# Patient Record
Sex: FEMALE | Race: WHITE | NOT HISPANIC OR LATINO | Employment: OTHER | URBAN - METROPOLITAN AREA
[De-identification: names, ages, dates, MRNs, and addresses within clinical notes are randomized per-mention and may not be internally consistent; named-entity substitution may affect disease eponyms.]

---

## 2017-08-08 ENCOUNTER — CONVERSION ENCOUNTER (OUTPATIENT)
Dept: OTHER | Facility: HOSPITAL | Age: 47
End: 2017-08-08

## 2018-03-02 ENCOUNTER — OFFICE VISIT CONVERTED (OUTPATIENT)
Dept: FAMILY MEDICINE CLINIC | Age: 48
End: 2018-03-02
Attending: NURSE PRACTITIONER

## 2018-05-31 ENCOUNTER — OFFICE VISIT CONVERTED (OUTPATIENT)
Dept: GASTROENTEROLOGY | Facility: CLINIC | Age: 48
End: 2018-05-31
Attending: INTERNAL MEDICINE

## 2018-11-13 ENCOUNTER — OFFICE VISIT CONVERTED (OUTPATIENT)
Dept: FAMILY MEDICINE CLINIC | Age: 48
End: 2018-11-13
Attending: NURSE PRACTITIONER

## 2019-01-29 ENCOUNTER — HOSPITAL ENCOUNTER (OUTPATIENT)
Dept: OTHER | Facility: HOSPITAL | Age: 49
Discharge: HOME | End: 2019-01-29
Attending: NURSE PRACTITIONER

## 2019-02-16 ENCOUNTER — OFFICE VISIT CONVERTED (OUTPATIENT)
Dept: FAMILY MEDICINE CLINIC | Age: 49
End: 2019-02-16
Attending: FAMILY MEDICINE

## 2019-06-06 ENCOUNTER — HOSPITAL ENCOUNTER (OUTPATIENT)
Dept: OTHER | Facility: HOSPITAL | Age: 49
Discharge: HOME | End: 2019-06-06
Attending: NURSE PRACTITIONER

## 2019-06-06 ENCOUNTER — OFFICE VISIT CONVERTED (OUTPATIENT)
Dept: FAMILY MEDICINE CLINIC | Age: 49
End: 2019-06-06
Attending: NURSE PRACTITIONER

## 2019-06-29 ENCOUNTER — OFFICE VISIT CONVERTED (OUTPATIENT)
Dept: FAMILY MEDICINE CLINIC | Age: 49
End: 2019-06-29
Attending: NURSE PRACTITIONER

## 2019-07-06 ENCOUNTER — OFFICE VISIT CONVERTED (OUTPATIENT)
Dept: FAMILY MEDICINE CLINIC | Age: 49
End: 2019-07-06
Attending: FAMILY MEDICINE

## 2019-09-17 ENCOUNTER — HOSPITAL ENCOUNTER (OUTPATIENT)
Dept: OTHER | Facility: HOSPITAL | Age: 49
Discharge: HOME | End: 2019-09-17

## 2019-10-05 ENCOUNTER — OFFICE VISIT CONVERTED (OUTPATIENT)
Dept: FAMILY MEDICINE CLINIC | Age: 49
End: 2019-10-05
Attending: NURSE PRACTITIONER

## 2019-10-15 ENCOUNTER — HOSPITAL ENCOUNTER (OUTPATIENT)
Dept: OTHER | Facility: HOSPITAL | Age: 49
Discharge: HOME | End: 2019-10-15
Attending: NURSE PRACTITIONER

## 2019-10-15 ENCOUNTER — OFFICE VISIT CONVERTED (OUTPATIENT)
Dept: FAMILY MEDICINE CLINIC | Age: 49
End: 2019-10-15
Attending: NURSE PRACTITIONER

## 2019-10-15 LAB
ALBUMIN SERPL-MCNC: 4.4 G/DL (ref 3.5–5)
ALBUMIN/GLOB SERPL: 1.2 {RATIO} (ref 1.4–2.6)
ALP SERPL-CCNC: 70 U/L (ref 53–141)
ALT SERPL-CCNC: 27 U/L (ref 10–40)
AMYLASE SERPL-CCNC: 66 U/L (ref 30–110)
ANION GAP SERPL CALC-SCNC: 18 MMOL/L (ref 8–19)
AST SERPL-CCNC: 27 U/L (ref 15–50)
BILIRUB SERPL-MCNC: 0.34 MG/DL (ref 0.2–1.3)
BUN SERPL-MCNC: 9 MG/DL (ref 5–25)
BUN/CREAT SERPL: 12 {RATIO} (ref 6–20)
CALCIUM SERPL-MCNC: 10.1 MG/DL (ref 8.7–10.4)
CHLORIDE SERPL-SCNC: 103 MMOL/L (ref 99–111)
CONV CO2: 24 MMOL/L (ref 22–32)
CONV TOTAL PROTEIN: 8.1 G/DL (ref 6.3–8.2)
CREAT UR-MCNC: 0.74 MG/DL (ref 0.5–0.9)
ERYTHROCYTE [DISTWIDTH] IN BLOOD BY AUTOMATED COUNT: 13 % (ref 11.7–14.4)
GFR SERPLBLD BASED ON 1.73 SQ M-ARVRAT: >60 ML/MIN/{1.73_M2}
GLOBULIN UR ELPH-MCNC: 3.7 G/DL (ref 2–3.5)
GLUCOSE SERPL-MCNC: 93 MG/DL (ref 65–99)
HCT VFR BLD AUTO: 38.9 % (ref 37–47)
HGB BLD-MCNC: 12.8 G/DL (ref 12–16)
LIPASE SERPL-CCNC: 44 U/L (ref 5–51)
MCH RBC QN AUTO: 27.6 PG (ref 27–31)
MCHC RBC AUTO-ENTMCNC: 32.9 G/DL (ref 33–37)
MCV RBC AUTO: 84 FL (ref 81–99)
OSMOLALITY SERPL CALC.SUM OF ELEC: 290 MOSM/KG (ref 273–304)
PLATELET # BLD AUTO: 358 10*3/UL (ref 130–400)
PMV BLD AUTO: 9.2 FL (ref 9.4–12.3)
POTASSIUM SERPL-SCNC: 4.3 MMOL/L (ref 3.5–5.3)
RBC # BLD AUTO: 4.63 10*6/UL (ref 4.2–5.4)
SODIUM SERPL-SCNC: 141 MMOL/L (ref 135–147)
WBC # BLD AUTO: 7.05 10*3/UL (ref 4.8–10.8)

## 2019-10-31 ENCOUNTER — HOSPITAL ENCOUNTER (OUTPATIENT)
Dept: OTHER | Facility: HOSPITAL | Age: 49
Discharge: HOME | End: 2019-10-31
Attending: NURSE PRACTITIONER

## 2019-11-04 ENCOUNTER — HOSPITAL ENCOUNTER (OUTPATIENT)
Dept: OTHER | Facility: HOSPITAL | Age: 49
Discharge: HOME | End: 2019-11-04
Attending: NURSE PRACTITIONER

## 2020-03-17 ENCOUNTER — OFFICE VISIT CONVERTED (OUTPATIENT)
Dept: FAMILY MEDICINE CLINIC | Age: 50
End: 2020-03-17
Attending: FAMILY MEDICINE

## 2020-04-08 ENCOUNTER — OFFICE VISIT CONVERTED (OUTPATIENT)
Dept: CARDIOLOGY | Facility: CLINIC | Age: 50
End: 2020-04-08
Attending: INTERNAL MEDICINE

## 2020-04-08 ENCOUNTER — CONVERSION ENCOUNTER (OUTPATIENT)
Dept: CARDIOLOGY | Facility: CLINIC | Age: 50
End: 2020-04-08
Attending: INTERNAL MEDICINE

## 2020-05-01 ENCOUNTER — HOSPITAL ENCOUNTER (OUTPATIENT)
Dept: NUCLEAR MEDICINE | Facility: HOSPITAL | Age: 50
Discharge: HOME | End: 2020-05-01
Attending: INTERNAL MEDICINE

## 2020-05-01 ENCOUNTER — OFFICE VISIT CONVERTED (OUTPATIENT)
Dept: FAMILY MEDICINE CLINIC | Age: 50
End: 2020-05-01
Attending: FAMILY MEDICINE

## 2020-05-28 ENCOUNTER — OFFICE VISIT CONVERTED (OUTPATIENT)
Dept: FAMILY MEDICINE CLINIC | Age: 50
End: 2020-05-28
Attending: FAMILY MEDICINE

## 2020-06-16 ENCOUNTER — HOSPITAL ENCOUNTER (OUTPATIENT)
Dept: OTHER | Facility: HOSPITAL | Age: 50
Discharge: HOME | End: 2020-06-16

## 2020-07-14 ENCOUNTER — OFFICE VISIT CONVERTED (OUTPATIENT)
Dept: FAMILY MEDICINE CLINIC | Age: 50
End: 2020-07-14
Attending: FAMILY MEDICINE

## 2020-07-14 ENCOUNTER — HOSPITAL ENCOUNTER (OUTPATIENT)
Dept: OTHER | Facility: HOSPITAL | Age: 50
Discharge: HOME | End: 2020-07-14
Attending: FAMILY MEDICINE

## 2020-10-01 ENCOUNTER — HOSPITAL ENCOUNTER (OUTPATIENT)
Dept: OTHER | Facility: HOSPITAL | Age: 50
Discharge: HOME | End: 2020-10-01
Attending: NURSE PRACTITIONER

## 2020-10-01 ENCOUNTER — OFFICE VISIT CONVERTED (OUTPATIENT)
Dept: FAMILY MEDICINE CLINIC | Age: 50
End: 2020-10-01
Attending: NURSE PRACTITIONER

## 2021-03-26 ENCOUNTER — HOSPITAL ENCOUNTER (OUTPATIENT)
Dept: OTHER | Facility: HOSPITAL | Age: 51
Discharge: HOME | End: 2021-03-26
Attending: FAMILY MEDICINE

## 2021-03-26 LAB
APPEARANCE UR: CLEAR
BILIRUB UR QL: NEGATIVE
COLOR UR: YELLOW
CONV BACTERIA: NEGATIVE
CONV COLLECTION SOURCE (UA): ABNORMAL
CONV HYALINE CASTS IN URINE MICRO: ABNORMAL /[LPF]
CONV UROBILINOGEN IN URINE BY AUTOMATED TEST STRIP: 1 {EHRLICHU}/DL (ref 0.1–1)
GLUCOSE UR QL: NEGATIVE MG/DL
HGB UR QL STRIP: ABNORMAL
KETONES UR QL STRIP: NEGATIVE MG/DL
LEUKOCYTE ESTERASE UR QL STRIP: ABNORMAL
NITRITE UR QL STRIP: NEGATIVE
PH UR STRIP.AUTO: 6 [PH] (ref 5–8)
PROT UR QL: NEGATIVE MG/DL
RBC #/AREA URNS HPF: ABNORMAL /[HPF]
SP GR UR: 1.02 (ref 1–1.03)
WBC #/AREA URNS HPF: ABNORMAL /[HPF]

## 2021-03-28 LAB — BACTERIA UR CULT: NORMAL

## 2021-04-13 ENCOUNTER — HOSPITAL ENCOUNTER (OUTPATIENT)
Dept: OTHER | Facility: HOSPITAL | Age: 51
Discharge: HOME | End: 2021-04-13
Attending: FAMILY MEDICINE

## 2021-04-13 ENCOUNTER — OFFICE VISIT CONVERTED (OUTPATIENT)
Dept: FAMILY MEDICINE CLINIC | Age: 51
End: 2021-04-13
Attending: FAMILY MEDICINE

## 2021-04-17 ENCOUNTER — OFFICE VISIT CONVERTED (OUTPATIENT)
Dept: FAMILY MEDICINE CLINIC | Age: 51
End: 2021-04-17
Attending: FAMILY MEDICINE

## 2021-05-10 NOTE — H&P
History and Physical      Patient Name: Marcela Gutierrez   Patient ID: [id number]   Sex: Female   YOB: 1970    Primary Care Provider: Madelin Espinal MD   Referring Provider: Madelin Espinal MD    Visit Date: April 8, 2020    Provider: Mario Green MD   Location: [facility]   Location Address: [address] [place] [address], [facility]   [place], [place]  [id number]   Location Phone: [phone]          Chief Complaint  · Chest discomfort      History Of Present Illness  Consult requested by: Madelin Espinal MD   Marcela Gutierrez is a 49 year old /White female with a previous history of  shunt placement for pseudotumor cerebri who had an episode while at the gym working out, doing elliptical machine, in which her heart rate went up into the 180s. The patient reports this lasted for a few minutes and was associated with some dizziness. Subsequently, the patient also has had a episodes of left jaw and arm pain, shooting in nature, sharp, associated with diaphoresis and nausea lasting for several minutes, 6/10 in intensity, for which she went to the emergency room. Workup there was unremarkable. Patient reports also having some left jaw pain the day of her tachycardic episode at the gym, as well. She has not had any syncope.   PAST MEDICAL HISTORY: Bulimia; Chronic degenerative knee problems making ambulation difficult; GERD; Hypertension; Pseudotumor cerebri with  shunt.   FAMILY MEDICAL HISTORY: Not known, as patient is adopted.   PSYCHOSOCIAL HISTORY: Patient denies smoking. Does use alcohol rarely. Caffeine daily. . Denies mood changes or depression.   CURRENT MEDICATIONS: Metoprolol 25 mg daily; baclofen 10 mg p.r.n.; vitamin C 1000 mg daily; vitamin K2 100 mcg daily; vitamin D 50,000 units weekly; vitamin B12 complex daily; glucosamine chondroitin daily. The dosage and frequency of the medications were reviewed with the patient.   ALLERGIES: MORPHINE; PERCODAN; PERCOCET; ZOLOFT;  "MASTAZOLE; GREEN DYE.       Review of Systems  · Constitutional  o Admits  o : good general health lately, recent weight changes   o Denies  o : fatigue  · Eyes  o Denies  o : double vision  · HENT  o Denies  o : hearing loss or ringing, chronic sinus problem, swollen glands in neck  · Cardiovascular  o Admits  o : chest pain, swelling (feet, ankles, hands), shortness of breath with activities   o Denies  o : palpitations (fast, fluttering, or skipping beats)  · Respiratory  o Admits  o : asthma or wheezing  o Denies  o : COPD  · Gastrointestinal  o Admits  o : nausea or vomiting  o Denies  o : ulcers  · Neurologic  o Admits  o : lightheaded or dizzy, headaches  o Denies  o : stroke  · Musculoskeletal  o Admits  o : joint pain, back pain  · Endocrine  o Denies  o : thyroid disease, diabetes, heat or cold intolerance, excessive thirst or urination  · Heme-Lymph  o Denies  o : bleeding or bruising tendency, anemia      Vitals  Date Time BP Position Site L\R Cuff Size HR RR TEMP (F) WT  HT  BMI kg/m2 BSA m2 O2 Sat        04/08/2020 10:43 /84 Sitting    72 - R   272lbs 0oz 5'  3\" 48.18 2.34           Physical Examination  · Constitutional  o Appearance  o : Awake, alert, in no acute distress.   · Head and Face  o HEENT  o : PERRLA.  · Eyes  o Conjunctivae  o : Normal.  · Ears, Nose, Mouth and Throat  o Oral Cavity  o :   § Oral Mucosa  § : Normal.  · Neck  o Inspection/Palpation  o : No JVD.  · Respiratory  o Respiratory  o : Chest is symmetrical. Lung fields are clear. No rhonchi or wheezes heard.  · Cardiovascular  o Heart  o :   § Auscultation of Heart  § : S1, S2 normal. Regular rate and rhythm without murmurs, gallops, or rubs.  o Peripheral Vascular System  o :   § Extremities  § : Nails normal. No clubbing or cyanosis. Femoral pulses adequate. Pedal pulses adequate. No peripheral edema.  · Gastrointestinal  o Abdominal Examination  o : Abdomen soft. No masses. No guarding or rigidity. No " hepatosplenomegaly. Bowel sounds normal.  · Musculoskeletal  o General  o :   § General Musculoskeletal  § : Muscle tone and strength were normal.  · Skin and Subcutaneous Tissue  o General Inspection  o : Unremarkable.  · EKG  o EKG  o : EKG showed normal sinus rhythm. No acute changes.  · Labs  o Labs  o : Creatinine 0.74, hemoglobin 12.8. Troponin less than 0.017.           Assessment     ASSESSMENT & PLAN:    1.  Tachycardia.  Episodes sounding like paroxysmal SVT.  Recommended checking echocardiogram.  Continue        with the current dose of metoprolol for the time being.  She has not had any recurrent episodes.         Encouraged avoidance of any stimulants, such as caffeine or pseudoephedrine.  Will also get a 24-hour        Holter monitor to evaluate for underlying dysrhythmia.    2.  Chest discomfort, occurring intermittently, nonexertional in nature, is concerning though for possible anginal        symptoms.  Recommend a stress test given her poor ambulatory ability due to knee problems.  Will have to        get a Lexiscan nuclear stress test.        Mario Green MD  /             Electronically Signed by: Viviana Lee-, Other -Author on April 13, 2020 10:24:35 AM  Electronically Co-signed by: Mario Green MD -Reviewer on April 20, 2020 02:35:35 PM

## 2021-05-10 NOTE — PROCEDURES
Procedure Note      Patient Name: Marcela Gutierrez   Patient ID: [id number]   Sex: Female   YOB: 1970    Primary Care Provider: Madelin Espinal MD   Referring Provider: Madelin Espinal MD    Visit Date: April 8, 2020    Provider: Mario Green MD   Location: [facility]   Location Address: [address] [place] [address] A   [place]  [id number]   Location Phone: [phone]          FINAL REPORT   HOLTER MONITOR REPORT  Date: 04/08/2020   Indication: Tachycardia      The patient underwent a 24-hour Holter monitor.  The average heart rate was 73 beats per minute.  The maximum heart rate was 125 beats per minute.  The minimum heart rate was 51 beats per minute.  There was one episode of PVC.  No other dysrhythmias seen.      IMPRESSION:     1.  Normal average heart rate of 73 beats per minute.    2.  One episode of PVC.        MD BRITT Ryan/nik    This note was transcribed by Allie Sellers.  [email or web address]/britt  The above service was transcribed by Allie Sellers, and I attest to the accuracy of the note.  BRITT                 Electronically Signed by: Gina Sellers-, Other -Author on April 16, 2020 09:38:46 AM  Electronically Co-signed by: Mario Green MD -Reviewer on April 20, 2020 01:50:39 PM

## 2021-05-15 VITALS
WEIGHT: 272 LBS | SYSTOLIC BLOOD PRESSURE: 140 MMHG | BODY MASS INDEX: 48.2 KG/M2 | HEIGHT: 63 IN | HEART RATE: 72 BPM | DIASTOLIC BLOOD PRESSURE: 84 MMHG

## 2021-05-16 VITALS — HEART RATE: 56 BPM | OXYGEN SATURATION: 96 % | BODY MASS INDEX: 50.02 KG/M2 | HEIGHT: 64 IN | WEIGHT: 293 LBS

## 2021-05-18 NOTE — PROGRESS NOTES
Marcela Greenberg. 1970     Office/Outpatient Visit    Visit Date: Fri, Mar 2, 2018 02:52 pm    Provider: Gina Perry N.P. (Assistant: Yeni Leos LPN)    Location: [facility]        Electronically signed by Gina Perry N.P. on  03/03/2018 11:30:04 AM                             SUBJECTIVE:        CC:     Mrs. Greenberg is a 47 year old White female.  She presents with back pain x3 weeks.  Went to [facility] ER last week 2x, xrays done and told her it was muscle spasms..          HPI:         Patient complains of back pain.  Pt states she was carrying totes up her stairs. She couldn't carry it so she tried to pull and then push. She couldn't get it d/t being worn out with it and back pain. The next morning when trying to get out of bed, she had a hard time swinging her leg out and over to the bed side. States back and low back and hip pain. She was leaning over and having a hard time standing up straight. She took IBF 800mg 3 times throughout that day. Then took a xanaflex that night. Woke up the next am with same issues. This went on for about 1 week. She called here for appt on Sat. She was told to go to ER. She went and they said it was muscular. Pt statse having grafts to her back along with other back surgeries. States med given at ER didn't help much. States going out to her neice's party. She coulldn't sit up straight. She then went back to ER last night. States pain was worse after trying to put her socks on. Pain was radiating down her L leg. Started with intermittent pain and then was constant.      ROS:     CONSTITUTIONAL:  Negative for chills, fatigue, fever, and weight change.      EYES:  Negative for blurred vision.      CARDIOVASCULAR:  Negative for chest pain, orthopnea, paroxysmal nocturnal dyspnea and pedal edema.      RESPIRATORY:  Negative for dyspnea.      GASTROINTESTINAL:  Negative for abdominal pain, constipation, diarrhea, nausea and vomiting.      MUSCULOSKELETAL:   Positive for back pain.      NEUROLOGICAL:  Negative for dizziness, headaches, paresthesias, and weakness.      PSYCHIATRIC:  Negative for anxiety, depression, and sleep disturbances.          PMH/FMH/SH:     Last Reviewed on 3/02/2018 03:35 PM by Gina Perry    Past Medical History:         pseudotumor cerebri/loss vision right eye     bulemia    vit D def    HTN    GERD             PAST MEDICAL HISTORY             CURRENT MEDICAL PROVIDERS:    Gastroenterologist: (name unknown)    Neurologist: dr upton    neurosurgeon dr. Nelly [place], Dr. Prem spencer         PREVENTIVE HEALTH MAINTENANCE             MAMMOGRAM: Done within last 2 years and results in are chart was last done 2017 with normal results     PAP SMEAR: was last done Hysterectomy BELINDA BSO  for heavy bleeding with normal results         Surgical History:         Appendectomy    Cholecystectomy    Hysterectomy: ;     oophrectomy;      Laminectomy: cervical region; at 1,2;  Uterine Ablation    gastric stapling;     shunt ; Procedures: colonoscopy  EGD  lexiscan stress test 2014 normal         Family History: adopted (drug abuse)     Father: Medical history unknown     Mother:     ; Positive for colon and esophageal cancer;     Brother(s): 1 brother(s) total    ; Positive for enlarged heart;     Sister(s): 4 sister(s) total    ; Positive for Drug Abuse;         Social History:     Occupation:    Disabled (pseudo tumor/vision loss)     Marital Status:      Children: 3 children         Tobacco/Alcohol/Supplements:     Last Reviewed on 3/02/2018 03:35 PM by Gina Perry    Tobacco: She has never smoked.          Alcohol:  Does not drink alcohol and never has.          Substance Abuse History:     Last Reviewed on 3/02/2018 03:35 PM by Gina Perry        Mental Health History:     Last Reviewed on 3/02/2018 03:35 PM by Gina Perry        Communicable Diseases (eg STDs):     Last Reviewed  on 3/02/2018 03:35 PM by Gina Perry            Current Problems:     Last Reviewed on 3/02/2018 03:35 PM by Gina Perry    Joint pain, NEC     Lower back pain     Chronic low back pain     Patient with cerebral ventricle (communicating) shunt, valve, or device in situ     Depression responsive to treatment     HTN     Vitamin D deficiency     Esophageal reflux         Immunizations:     Fluzone (3 + years dose) 1/25/2018         Allergies:     Last Reviewed on 3/02/2018 03:35 PM by Gina Perry    Percodan:    Percocet:    Morphine Sulfate:    Zoloft:        Current Medications:     Last Reviewed on 3/02/2018 03:35 PM by Gina Perry    Ibuprofen 800mg Tablet 1 bid PRN     Albuterol 90mcg/1actuation Oral Inhaler 2 puffs  QID  PRN     Baclofen 10mg Tablet     Prozac 20mg Capsules Take  2  capsule(s) by mouth qam     Norco     Prednisone     Protonix     Skelaxin 800mg Tablet One every 6-8 hrs prn muscle spasms     Zantac 300mg Tablet 1 bid     Zyrtec 10mg Tablet 1 tab daily         OBJECTIVE:        Vitals:         Current: 3/2/2018 2:56:27 PM    Ht:  5 ft, 3.5 in;  Wt: 312.9 lbs;  BMI: 54.6    T: 97.3 F (oral);  BP: 146/88 mm Hg (left arm, sitting);  P: 98 bpm (left arm (BP Cuff), sitting);  sCr: 0.76 mg/dL;  GFR: 120.45        Exams:     PHYSICAL EXAM:     GENERAL: vital signs recorded - well developed, well nourished;  no apparent distress;     EYES: extraocular movements intact; conjunctiva and cornea are normal; PERRLA;     NECK: range of motion is normal; thyroid is non-palpable;     RESPIRATORY: normal respiratory rate and pattern with no distress; normal breath sounds with no rales, rhonchi, wheezes or rubs;     CARDIOVASCULAR: normal rate; rhythm is regular;  no systolic murmur; no edema;     GASTROINTESTINAL: nontender; normal bowel sounds; no organomegaly;     MUSCULOSKELETAL: Low back tenderness, scarring from prior surgery with ocncave area noted. Low back tightness noted. SLR pos  L at 20 degrees. SLR neg to R. Difficulty changing position from sitting to standing.;     NEUROLOGICAL:  cranial nerves, motor and sensory function, reflexes, gait and coordination are all intact;     PSYCHIATRIC:  appropriate affect and demeanor; normal speech pattern; grossly normal memory;         ASSESSMENT:           722.10   M54.16  Lumbar radiculopathy              DDx:         ORDERS:         Radiology/Test Orders:       [id number]  Magnetic resonance imaging, spinal canal and contents, lumbar; without contrast  (Send-Out)                   PLAN:          Lumbar radiculopathy         FOLLOW-UP TESTING #1:    RADIOLOGY:  I have ordered MRI Lumbar Spine w/o contrast to be done today.            Prescriptions: Continue meds already given.           Orders:       [id number]  Magnetic resonance imaging, spinal canal and contents, lumbar; without contrast  (Send-Out)             Patient Education Handouts:       Magnetic Resonance Imaging (MRI)              CHARGE CAPTURE:           Primary Diagnosis:     722.10 Lumbar radiculopathy            M54.16    Radiculopathy, lumbar region              Orders:          [id number]   Office/outpatient visit; established patient, level 3  (In-House)

## 2021-05-18 NOTE — PROGRESS NOTES
"Marcela Gutierrez  1970     Office/Outpatient Visit    Visit Date: Tue, Mar 17, 2020 04:38 pm    Provider: Tony Cat MD (Assistant: Quiana Barnes MA)    Location: [facility]        Electronically signed by Tony Cat MD on  03/17/2020 07:37:12 PM                             Subjective:        CC: Mrs. Gutierrez is a 49 year old White female.  This is a follow-up visit.          HPI:       Patient presents to clinic today for emergency department discharge follow-up of chest pain. Patient reports that her symptoms began at approximately 1:30 in the morning on Saturday.  She said she felt like she had a pressure in the left side of her chest that evolved into a feeling of a \"bubble\" in her chest that spread towards her neck and \"under her tongue.\" On Sunday, she felt like she had a \"fire\" in her chest that radiated to her shoulder and left arm. A few hours after having the sensation, she experienced diaphoresis with associated nausea and dizziness while shopping at the store.  This episode lasted for only a few moments and resolved on its own.  However, the event scared her enough that she proceeded to present to the emergency department for further evaluation.  She said while at the ED, her chest pain improved with nitroglycerin.  Work-up included trended troponins, CMP, CBC, chest x-ray and EKG all of which were normal.  Patient reports that she has no prior Cardiac history.  She is unaware of her family history as she is adopted.  She does not smoke.  She did have episodes of palpitations and chest pain for which she had a stress test in 2014 which was negative. She denies shortness of breath, edema or palpitations.    ROS:     CONSTITUTIONAL:  Negative for chills, fatigue, fever, and weight change.      EYES:  Negative for blurred vision.      CARDIOVASCULAR:  Positive for chest pain.   Negative for dizziness, palpitations or edema.      RESPIRATORY:  Negative for dyspnea and " cough.      GASTROINTESTINAL:  Negative for abdominal pain, diarrhea, heartburn, nausea and vomiting.      NEUROLOGICAL:  Negative for headaches, paresthesias and weakness.          Past Medical History / Family History / Social History:         Last Reviewed on 3/17/2020 07:36 PM by Tony Cat    Past Medical History: cva in her 30s         pseudotumor cerebri/loss vision right eye     bulemia    vit D def    HTN    GERD             PAST MEDICAL HISTORY             CURRENT MEDICAL PROVIDERS:    Gastroenterologist: steven    Neurologist: dr upton         PREVENTIVE HEALTH MAINTENANCE         unknown pneumonia shot  once between  -      BONE DENSITY: was last done 2019 with the following abnormality noted-- mild osteopenia lumbar spine     COLORECTAL CANCER SCREENING:; 10/9/17     EYE EXAM: was last done -      MAMMOGRAM: Done within last 2 years and results in are chart was last done 19 with normal results     PAP SMEAR: was last done Hysterectomy BELINDA BSO  for heavy bleeding with normal results         PAST MEDICAL HISTORY             CURRENT MEDICAL PROVIDERS:    Gastroenterologist: SREEKANTH GURROLA    Neurologist: BART    Ophthalmologist:     Orthopedist: DORIAN         Surgical History:         Appendectomy    Cholecystectomy    Hysterectomy: ;     oophrectomy;     Laminectomy: cervical region; at 1,2;  Uterine Ablation    gastric stapling;     shunt ; Procedures: colonoscopy  EGD  and 10-9-17 lexiscan stress test 2014 normal         Family History: adopted (drug abuse)     Father: Medical history unknown     Mother:     ; Positive for colon and esophageal cancer;     Brother(s): 1 brother(s) total    ; Positive for enlarged heart;     Sister(s): 4 sister(s) total    ; Positive for Drug Abuse;         Social History:     Occupation:    Disabled (pseudo tumor/vision loss)     Marital Status:      Children: 3 children          Tobacco/Alcohol/Supplements:     Last Reviewed on 3/17/2020 07:36 PM by Tony Cat    Tobacco: She has never smoked.          Alcohol:  Does not drink alcohol and never has.          Substance Abuse History:     Last Reviewed on 3/17/2020 07:36 PM by Tony Cat        Mental Health History:     Last Reviewed on 3/17/2020 07:36 PM by Tony Cat        Communicable Diseases (eg STDs):     Last Reviewed on 3/17/2020 07:36 PM by Tony Cat        Current Problems:     Last Reviewed on 3/17/2020 07:36 PM by Tony Cat    Esophageal reflux    Vitamin D deficiency    Vitamin D deficiency, unspecified    Essential (primary) hypertension    HTN    Depression responsive to treatment    Presence of cerebrospinal fluid drainage device    Chronic low back pain    Patient with cerebral ventricle (communicating) shunt, valve, or device in situ    Low back pain    Lower back pain    Joint pain, NEC    Pain in unspecified joint    Radiculopathy, lumbar region    Lumbar radiculopathy    Pure hyperglyceridemia    Other fatigue    Fatigue    Hyperlipidemia    Essential hypertension, benign    Dizziness    Dizziness and giddiness    Unspecified abdominal pain    Abdominal pain, unspecified    Angina pectoris, unspecified        Immunizations:     Td adult 11/13/2018    Fluzone (3 + years dose) 1/25/2018    Fluzone pf (3+ years dose) 10/5/2019    Fluzone Quadrivalent (3+ years) 11/13/2018        Allergies:     Last Reviewed on 3/17/2020 07:36 PM by Tony Cat    Percodan:      Percocet:      Morphine Sulfate:      MASTAZOLE:      Zoloft:      Latex:      green dye:          Current Medications:     Last Reviewed on 3/17/2020 07:36 PM by Tony Cat    Prozac 20 mg oral capsule [Take  1capsule(s) by mouth qam]    Baclofen 10 mg oral tablet    Albuterol 90mcg/1actuation Oral Inhaler [2 puffs  QID  PRN]    vITAMIN D3 20,000 Q DAY     glucosamine-chondroitin 750-600 mg oral tablet [1 tab bid]    Singulair  10mg Tablet  "[Take 1 tablet(s) by mouth each evening]    metoprolol tartrate 25 mg oral tablet [1 TAB DAILY]    vitamin K     magnesium         Objective:        Vitals:         Current: 3/17/2020 4:47:39 PM    Ht:  5 ft, 3.5 in;  Wt: 268.4 lbs;  BMI: 46.8T: 98.4 F (oral);  BP: 160/84 mm Hg (right arm, sitting);  P: 72 bpm (right arm (BP Cuff), sitting);  sCr: 0.74 mg/dL;  GFR: 113.42        Repeat:     7:34:58 PM  BP:   138/82mm Hg (right arm, sitting)     Exams:     PHYSICAL EXAM:     GENERAL: vital signs recorded - well developed, well nourished;  no apparent distress;     EYES: conjunctiva and cornea are normal;     RESPIRATORY: Clear to auscultation bilaterally; no rales (\"crackles\") present; no rhonchi; no wheezes;     CARDIOVASCULAR: normal rate; rhythm is regular;  No murmurs. clicks, gallops or rubs appreciated; no edema;     BREAST/INTEGUMENT: No significant rashes, lesions or suspicious moles within limits of examination;     NEUROLOGIC: Grossly intact; mental status: alert and oriented x 3;     PSYCHIATRIC: appropriate affect and demeanor; normal speech pattern; Normal behavior;         Assessment:         I20.9   Angina pectoris, unspecified           ORDERS:         Procedures Ordered:       REFER  Referral to Specialist or Other Facility  (Send-Out)                      Plan:         Angina pectoris, unspecified- Chest pain has some typical features of cardiac chest pain but also some atypical features.  Out of caution, will refer to cardiology for further evaluation.        REFERRALS:  Referral initiated to a cardiologist ( Dr. Vernon Mas, [facility] ).            Orders:       REFER  Referral to Specialist or Other Facility  (Send-Out)                  Charge Capture:         Primary Diagnosis:     I20.9  Angina pectoris, unspecified           Orders:      [id number]  Office/outpatient visit; established patient, level 3  (In-House)                  ADDENDUMS:      " ____________________________________    Addendum: 04/06/2020 02:27 PM - Three, Team         Visit Note Faxed to:        Vernon Mas  (Cardiology); Number [phone]       Vernon Mas  (Cardiology); Number [phone]
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
## 2021-05-18 NOTE — PROGRESS NOTES
"Marcela Gutierrez  1970     Office/Outpatient Visit    Visit Date: Tue, Jul 14, 2020 08:46 am    Provider: Tony Cat MD (Assistant: Vonnie Hernández MA)    Location: [facility]        Electronically signed by Tony Cat MD on  07/15/2020 07:05:37 PM                             Subjective:        CC: Mrs. Gutierrez is a 50 year old White female.  Follow up on Blood Pressure;         HPI:           PHQ-9 Depression Screening: Completed form scanned and in chart; Total Score 6       Marcela presents to clinic today for follow-up of her blood pressure.  A few visits ago, she was seen after a recent stress test caused her to have marketed hypertension in the low 200s systolic.  We have been titrating her antihypertensive regimen upwards ever since.  Her current regimen includes lisinopril 10 mg daily, hydrochlorothiazide 25 mg daily and metoprolol 50 mg daily.  She says that over the last several weeks her pressures have been borderline to high. However, her neurologist recently started Topamax to help with headaches and since that time her blood pressures are actually stay controlled.  They are controlled today.  Her headaches have decreased in frequency but she is still very concerned there is something wrong with her  shunt.  Recent work-up to investigate this showed normal shunt placement/function and a negative head CT.  She denies blurry vision, dizziness, chest pain, shortness of breath, edema or palpitations.      Marcela reports a \"knot\" on the dorsal surface of her right foot that is been present for several years.  She said she had it drained once but it is since returned to its original size.  She is not sure what the exact diagnosis is but says that it is starting to be a bother.  She wants to know if she can have it drained again.    ROS:     CONSTITUTIONAL:  Negative for chills, fatigue, fever, and weight change.      EYES:  Negative for blurred vision.      CARDIOVASCULAR:  " Negative for chest pain, dizziness, palpitations and edema.      RESPIRATORY:  Negative for dyspnea and cough.      GASTROINTESTINAL:  Negative for abdominal pain, diarrhea, heartburn, nausea and vomiting.      MUSCULOSKELETAL:  Positive for swelling of right dorsal foot.      NEUROLOGICAL:  Positive for headaches.   Negative for paresthesias or weakness.          Past Medical History / Family History / Social History:         Last Reviewed on 7/15/2020 07:05 PM by Tony Cat    Past Medical History: cva in her 30s         pseudotumor cerebri/loss vision right eye     bulemia    vit D def    HTN    GERD             PAST MEDICAL HISTORY             CURRENT MEDICAL PROVIDERS:    Cardiologist    Gastroenterologist: steven    Neurologist: dr upton         PREVENTIVE HEALTH MAINTENANCE         unknown pneumonia shot  once between  -      BONE DENSITY: was last done 2019 with the following abnormality noted-- mild osteopenia lumbar spine     COLORECTAL CANCER SCREENING:; 10/9/17     EYE EXAM: was last done -      MAMMOGRAM: Done within last 2 years and results in are chart was last done 19 with normal results     PAP SMEAR: was last done Hysterectomy [facility] BSO  for heavy bleeding with normal results         PAST MEDICAL HISTORY             CURRENT MEDICAL PROVIDERS:    Gastroenterologist: SREEKANTH GURROLA    Neurologist: BART    Ophthalmologist:     Orthopedist: DORIAN         Surgical History:         Appendectomy    Cholecystectomy    Hysterectomy: ;     oophrectomy;     Laminectomy: cervical region; at 1,2;  Uterine Ablation    gastric stapling;     shunt ; Procedures: colonoscopy  EGD  and 10-9-17 lexiscan stress test 2014 normal         Family History: adopted (drug abuse)     Father: Medical history unknown     Mother:     ; Positive for colon and esophageal cancer;     Brother(s): 1 brother(s) total    ; Positive for enlarged heart;      Sister(s): 4 sister(s) total    ; Positive for Drug Abuse;         Social History:     Occupation:    Disabled (pseudo tumor/vision loss)     Marital Status:      Children: 3 children         Tobacco/Alcohol/Supplements:     Last Reviewed on 7/15/2020 07:05 PM by Tony Cat    Tobacco: She has never smoked.          Alcohol:  Does not drink alcohol and never has.          Substance Abuse History:     Last Reviewed on 7/15/2020 07:05 PM by Tony Cat        Mental Health History:     Last Reviewed on 7/15/2020 07:05 PM by Tony Cat        Communicable Diseases (eg STDs):     Last Reviewed on 7/15/2020 07:05 PM by Tony Cat        Current Problems:     Last Reviewed on 7/15/2020 07:05 PM by Tony Cat    Esophageal reflux    Vitamin D deficiency    Vitamin D deficiency, unspecified    Essential (primary) hypertension    HTN    Patient with cerebral ventricle (communicating) shunt, valve, or device in situ    Depression responsive to treatment    Presence of cerebrospinal fluid drainage device    Chronic low back pain    Low back pain    Lower back pain    Joint pain, NEC    Pain in unspecified joint    Radiculopathy, lumbar region    Lumbar radiculopathy    Fatigue    Hyperlipidemia    Other fatigue    Pure hyperglyceridemia    Essential hypertension, benign    Dizziness    Dizziness and giddiness    Unspecified abdominal pain    Abdominal pain, unspecified    Angina pectoris, unspecified    Benign intracranial hypertension    Localized swelling, mass and lump, right lower limb    Encounter for screening for depression        Immunizations:     Td adult 11/13/2018    Fluzone (3 + years dose) 1/25/2018    Fluzone pf (3+ years dose) 10/5/2019    Fluzone Quadrivalent (3+ years) 11/13/2018        Allergies:     Last Reviewed on 7/15/2020 07:05 PM by Tony Cat    Percodan:      Percocet:      Morphine Sulfate:      MASTAZOLE:      Zoloft:      Latex:      green dye:          Current  Medications:     Last Reviewed on 7/15/2020 07:05 PM by Tony Cat    vitamin K     magnesium     Prozac 20 mg oral capsule [Take  1capsule(s) by mouth qam]    Baclofen 10 mg oral tablet    Albuterol 90mcg/1actuation Oral Inhaler [2 puffs  QID  PRN]    glucosamine-chondroitin 750-600 mg oral tablet [1 tab bid]    Singulair  10mg Tablet [Take 1 tablet(s) by mouth each evening]    hydroCHLOROthiazide 25 mg oral tablet [take 1 tablet (25 mg) by oral route 1 times per day]    lisinopriL 10 mg oral tablet [take 1 tablet (10 mg) by oral route once daily]    metoprolol tartrate 50 mg oral tablet [take 1 tablet (50 mg) by oral route 1 time per day with meals]    topiramate 100 mg oral tablet [take 1 tablet (100 mg) by oral route once daily]        Objective:        Vitals:         Current: 7/14/2020 8:54:04 AM    Ht:  5 ft, 3.5 in;  Wt: 261.8 lbs;  BMI: 45.6T: 97.1 F (temporal);  BP: 140/86 mm Hg (right arm, sitting);  P: 67 bpm (right arm (BP Cuff), sitting);  sCr: 0.74 mg/dL;  GFR: 111.00        Exams:     PHYSICAL EXAM:     GENERAL: vital signs recorded - well developed, well nourished;  no apparent distress;     EYES: conjunctiva and cornea are normal;     NECK: trachea is midline; thyroid is non-palpable; carotid exam is normal with good upstroke and no bruits;     RESPIRATORY: normal respiratory rate and pattern with no distress;     CARDIOVASCULAR: normal rate; rhythm is regular;  No murmurs. clicks, gallops or rubs appreciated; no edema;     BREAST/INTEGUMENT: No significant rashes, lesions or suspicious moles within limits of examination/encounter;     MUSCULOSKELETAL: area of swelling noted over right dorsal naviuclar area that is nontender to palption;     NEUROLOGIC: Grossly intact; mental status: alert and oriented x 3; cranial nerves II-XII grossly intact; Reflexes: knee jerks: 2+;     PSYCHIATRIC: appropriate affect and demeanor; normal speech pattern; Normal behavior;         Assessment:         I10    Essential (primary) hypertension       G93.2   Benign intracranial hypertension       Z98.2   Presence of cerebrospinal fluid drainage device       R22.41   Localized swelling, mass and lump, right lower limb       Z13.31   Encounter for screening for depression           ORDERS:         Radiology/Test Orders:       [id number]  Us lmtd joint/oth nonvasc xtr strux r-t w/img  (Send-Out)    TOP RIGHT FOOT          Other Orders:         Depression screen positive and follow up plan documented  (In-House)                      Plan:         Essential (primary) hypertension- Controlled today.  Marcela would like to see if her blood pressure stays controlled with recent addition of Topamax.  As such, she is going to continue to check her blood pressure daily or once every couple days and report any abnormal readings to our office.  In the meantime, she will continue her current regimen of hydrochlorothiazide 25 mg daily, lisinopril 10 mg daily and Lopressor 50 mg daily.        Benign intracranial hypertension- Headaches have improved with recent addition of Topamax.  Recent CT and  shunt study were unremarkable.  Continue to follow with neurology as directed.        Presence of cerebrospinal fluid drainage device- See above        Localized swelling, mass and lump, right lower limb- Unclear etiology.  Differential diagnosis includes ganglion cyst versus sebaceous cyst versus lipoma versus arthritic changes.  Ultrasound ordered for further evaluation          Orders:       [id number]  Us lmtd joint/oth nonvasc xtr strux r-t w/img  (Send-Out)    TOP RIGHT FOOT          Encounter for screening for depression    MIPS PHQ-9 Depression Screening: Completed form scanned and in chart; Total Score enter PHQ-9 total score Positive Depression Screen: Stable on medications. No suicidal ideation.            Orders:         Depression screen positive and follow up plan documented  (In-House)                  Charge Capture:          Primary Diagnosis:     I10  Essential (primary) hypertension           Orders:      [id number]  Office/outpatient visit; established patient, level 4  (In-House)              G93.2  Benign intracranial hypertension     Z98.2  Presence of cerebrospinal fluid drainage device     R22.41  Localized swelling, mass and lump, right lower limb     Z13.31  Encounter for screening for depression           Orders:        Depression screen positive and follow up plan documented  (In-House)

## 2021-05-18 NOTE — PROGRESS NOTES
"Marcela Gutierrez 1970     Office/Outpatient Visit    Visit Date: Sat, Oct 5, 2019 11:14 am    Provider: Gina Perry N.P. (Assistant: Aracely Hale RN)    Location: [facility]        Electronically signed by Gina Perry N.P. on  10/05/2019 05:29:52 PM                             SUBJECTIVE:        CC:     Mrs. Gutierrez is a 49 year old White female.  Dizziness;         HPI:         Dx with dizziness; pt states dizziness starting 3 days ago. States thinking her BP was elevated. She had it checked 3 days ago with a reading of 120 something over 80's. States going up the stairs and states it went black and swirly for a few seconds. She was also nauseated. States then feeling like a brain fog, an \"off\" feeling. She has lost 85 pounds since May 31 2018. She has gained some back. She is upset bc she is feeling better and then now having BP issues and not feeling well.      ROS:     CONSTITUTIONAL:  Negative for chills, fatigue, fever, and weight change.      EYES:  Negative for blurred vision.      CARDIOVASCULAR:  Negative for chest pain, orthopnea, paroxysmal nocturnal dyspnea and pedal edema.      RESPIRATORY:  Negative for dyspnea.      GASTROINTESTINAL:  Negative for abdominal pain, constipation, diarrhea, nausea and vomiting.      NEUROLOGICAL:  Positive for vertigo.      PSYCHIATRIC:  Negative for anxiety, depression, and sleep disturbances.          PMH/FMH/SH:     Last Reviewed on 10/05/2019 11:38 AM by Gina Perry    Past Medical History: cva in her 30s         pseudotumor cerebri/loss vision right eye     bulemia    vit D def    HTN    GERD             PAST MEDICAL HISTORY             CURRENT MEDICAL PROVIDERS:    Gastroenterologist: steven    Neurologist: dr upton         PREVENTIVE HEALTH MAINTENANCE         unknown pneumonia shot  once between 2011 - 2013     BONE DENSITY: was last done jan 2019 with the following abnormality noted-- mild osteopenia lumbar spine     " COLORECTAL CANCER SCREENING:; 10/9/17     EYE EXAM: was last done -      MAMMOGRAM: Done within last 2 years and results in are chart was last done 19 with normal results     PAP SMEAR: was last done Hysterectomy [facility]  for heavy bleeding with normal results         PAST MEDICAL HISTORY             CURRENT MEDICAL PROVIDERS:    Gastroenterologist: SREEKANTH GURROLA    Neurologist: BART    Ophthalmologist:     Orthopedist: DORIAN         Surgical History:         Appendectomy    Cholecystectomy    Hysterectomy: ;     oophrectomy;      Laminectomy: cervical region; at 1,2;  Uterine Ablation    gastric stapling;     shunt ; Procedures: colonoscopy  EGD  lexiscan stress test 2014 normal         Family History: adopted (drug abuse)     Father: Medical history unknown     Mother:     ; Positive for colon and esophageal cancer;     Brother(s): 1 brother(s) total    ; Positive for enlarged heart;     Sister(s): 4 sister(s) total    ; Positive for Drug Abuse;         Social History:     Occupation:    Disabled (pseudo tumor/vision loss)     Marital Status:      Children: 3 children         Tobacco/Alcohol/Supplements:     Last Reviewed on 10/05/2019 11:38 AM by Gina Perry    Tobacco: She has never smoked.          Alcohol:  Does not drink alcohol and never has.          Substance Abuse History:     Last Reviewed on 10/05/2019 11:38 AM by Gina Perry        Mental Health History:     Last Reviewed on 10/05/2019 11:38 AM by Gina Perry        Communicable Diseases (eg STDs):     Last Reviewed on 10/05/2019 11:38 AM by Gina Perry            Current Problems:     Last Reviewed on 10/05/2019 11:38 AM by Gina Perry    Essential hypertension, benign     Fatigue     Hyperlipidemia     Lumbar radiculopathy     Joint pain, NEC     Lower back pain     Chronic low back pain     Patient with cerebral ventricle (communicating) shunt, valve, or  "device in situ     Depression responsive to treatment     HTN     Vitamin D deficiency     Esophageal reflux         Immunizations:     Td adult 11/13/2018     Fluzone (3 + years dose) 1/25/2018     Fluzone Quadrivalent (3+ years) 11/13/2018         Allergies:     Last Reviewed on 10/05/2019 11:38 AM by Gina Perry    Percodan:    Percocet:    Morphine Sulfate:    Zoloft:        Current Medications:     Last Reviewed on 10/05/2019 11:38 AM by Gina Perry    Singulair  10mg Tablet Take 1 tablet(s) by mouth each evening     Prozac 20mg Capsules Take  1capsule(s) by mouth qam     Albuterol 90mcg/1actuation Oral Inhaler 2 puffs  QID  PRN     Baclofen 10mg Tablet     Metoprolol 25mg Tablet 1 TAB DAILY     Glucosamine and Chondroitin ES 750mg/600mg Tablet 1 tab bid     Ranitidine 150mg Capsules 1 po qod     vITAMIN D3 20,000 Q DAY     Zyrtec 10mg Tablet 1 tab daily         OBJECTIVE:        Vitals:         Current: 10/5/2019 11:22:59 AM    Ht:  5 ft, 3.5 in;  Wt: 271.8 lbs;  BMI: 47.4    T: 98.4 F (oral);  BP: 149/73 mm Hg (left arm, sitting);  P: 71 bpm (left arm (BP Cuff), sitting);  sCr: 0.75 mg/dL;  GFR: 112.50        Repeat:     12:06:59 PM     BP:   147/72mm Hg (right arm, lying, pulse-63)     12:09:36 PM     BP:   141/78mm Hg (right arm, sitting, pulse-62    PT GOT REALLY DIZZY UPON SITTING)     12:12:35 PM     BP:   155/80mm Hg (right arm, standing, pulse-62    \"i FELT A LITTLE WOOSY WHEN I STOOD UP\")         Exams:     PHYSICAL EXAM:     GENERAL: vital signs recorded - well developed, well nourished;  no apparent distress;     EYES: horizontal nystagmus;  conjunctiva and cornea are normal; PERRLA;     E/N/T:  normal EACs, TMs, nasal/oral mucosa, teeth, gingiva, and oropharynx;     NECK: range of motion is normal; thyroid is non-palpable;     RESPIRATORY: normal respiratory rate and pattern with no distress; normal breath sounds with no rales, rhonchi, wheezes or rubs;     CARDIOVASCULAR: normal rate; " rhythm is regular;  no systolic murmur; no edema;     NEUROLOGICAL:  cranial nerves, motor and sensory function, reflexes, gait and coordination are all intact;     PSYCHIATRIC:  appropriate affect and demeanor; normal speech pattern; grossly normal memory;         Lab/Test Results:         LABORATORY RESULTS: EKG performed by [facility]         Procedures:     Vaccination against other viral diseases, Influenza     1. Influenza, seasonal PF (children 3 years to adult): 0.5 ml unit dose given IM in the left upper arm; administered by [facility] Regarding contraindications to an Influenza vaccine: Denies moderate/severe illness with/without fever; serious reaction to eggs, egg proteins, gentamicin, gelatin, arginine, neomycin or polymixin; serious reaction after recieving previous influenza vaccines; and history of Guillain-[place] Syndrome.              ASSESSMENT:           V04.81   Z23  Vaccination against other viral diseases, Influenza              DDx:     780.4   R42  Dizziness              DDx:         ORDERS:         Meds Prescribed:       Meclizine HCl 25mg Tablet Take 1 to 2 tabs twice daily as needed for dizziness.  #40 (Forty) tablet(s) Refills: 1         Radiology/Test Orders:       62504  Electrocardiogram, routine with at least 12 leads; with interpretation and report  (In-House)           Lab Orders:       ORTHO  Orthostatic blood pressure  (In-House)           Procedures Ordered:       59826  Immunization administration; one vaccine  (In-House)           Other Orders:       75215  Influenza virus vaccine, quadrivalent, split virus, preservative free 3 years of age & older  (In-House)                   PLAN:          Vaccination against other viral diseases, Influenza           Orders:       34031  Immunization administration; one vaccine  (In-House)         32113  Influenza virus vaccine, quadrivalent, split virus, preservative free 3 years of age & older  (In-House)            Dizziness Epley handout given. Pt  to call if no relief and will send to PT.           Prescriptions:       Meclizine HCl 25mg Tablet Take 1 to 2 tabs twice daily as needed for dizziness.  #40 (Forty) tablet(s) Refills: 1           Orders:       [id number]  Electrocardiogram, routine with at least 12 leads; with interpretation and report  (In-House)         ORTHO  Orthostatic blood pressure  (In-House)               CHARGE CAPTURE:           Primary Diagnosis:     V04.81 Vaccination against other viral diseases, Influenza            Z23    Encounter for immunization              Orders:          26923   Office/outpatient visit; established patient, level 4 (1 hour 2 minutes)  (In-House)             14157   Immunization administration; one vaccine  (In-House)             74381   Influenza virus vaccine, quadrivalent, split virus, preservative free 3 years of age & older  (In-House)           780.4 Dizziness            R42    Dizziness and giddiness              Orders:          83099   Electrocardiogram, routine with at least 12 leads; with interpretation and report  (In-House)             ORTHO   Orthostatic blood pressure  (In-House)

## 2021-05-18 NOTE — PROGRESS NOTES
Marcela Gutierrez  1970     Office/Outpatient Visit    Visit Date: Thu, May 28, 2020 08:47 am    Provider: Tony Cat MD (Assistant: Beatriz Hirsch MA)    Location: [facility]        Electronically signed by Tony Cat MD on  05/28/2020 09:24:33 AM                             Subjective:        CC: Mrs. Gutierrez is a 49 year old White female.  Follow up visit (AUDIO PHONE CALL ); Today's encounter is being done with a telehealth visit. She has consented verbally with two witnesses for todays treatment. Todays visit is being conducted by audio only. Individuals present during the telemedicine consultation include Patient and Dr. Cat         HPI: Marcela is being evaluated today as a follow-up of hypertension.  She recently had a stress test during which she became markedly hypertensive in the low 200s systolic.  At last visit, she was started on hydrochlorothiazide 12.5 mg daily additionally, she says her blood pressure remained elevated at her recent neurology visit and they increased her metoprolol CR is being evaluated today for follow-up of hypertension.  She recently had a stress test during which she became markedly hypertensive into the low 200s systolic.  At last visit, she was started on hydrochlorothiazide 12.5 mg daily and this was subsequently increased to 25 mg daily.  This is subsequently been increased to 25 mg. Additionally, at her recent neurology visit her metoprolol was increased to 50 mg daily due to persistent high blood pressure.  She has been monitoring her pressures every couple days in the morning with average readings in the 150 systolic over 90s to 100s diastolic.  She has had pressures as high as 180s over 100s. She notes that she is continued to have headaches.  Of note, she has a history of benign intracranial hypertension for which a  shunt is been placed.  Her headaches are located primarily on the right side of her head where her shunt as  she thinks that it is draining sluggishly.  She has contacted her neurologist and they are planning a CT scan and a function study to determine if there are indeed any problems. She has had no recent chest pain.  Recent stress testing and echocardiogram were normal.  No shortness of breath, edema or palpitations.    ROS:     CONSTITUTIONAL:  Negative for chills, fatigue, fever, and weight change.      EYES:  Negative for blurred vision.      CARDIOVASCULAR:  Negative for chest pain, dizziness, palpitations and edema.      RESPIRATORY:  Negative for dyspnea and cough.      GASTROINTESTINAL:  Negative for abdominal pain, diarrhea, heartburn, nausea and vomiting.      NEUROLOGICAL:  Positive for headaches.   Negative for paresthesias or weakness.          Past Medical History / Family History / Social History:         Last Reviewed on 5/28/2020 09:24 AM by Tony Cat    Past Medical History: cva in her 30s         pseudotumor cerebri/loss vision right eye     bulemia    vit D def    HTN    GERD             PAST MEDICAL HISTORY             CURRENT MEDICAL PROVIDERS:    Cardiologist    Gastroenterologist: steven    Neurologist: dr upton         PREVENTIVE HEALTH MAINTENANCE         unknown pneumonia shot  once between 2011 - 2013     BONE DENSITY: was last done jan 2019 with the following abnormality noted-- mild osteopenia lumbar spine     COLORECTAL CANCER SCREENING:; 10/9/17     EYE EXAM: was last done 2016- glasses     MAMMOGRAM: Done within last 2 years and results in are chart was last done 1-29-19 with normal results     [facility] SME[facility]: [facility] normal results         PAST MEDICAL HISTORY             CURRENT MEDICAL PROVIDERS:    Gastroenterologist: SREEKANTH GURROLA    Neurologist: BART    Ophthalmologist: 20/20    Orthopedist: DORIAN         Surgical History:         Appendectomy    Cholecystectomy    Hysterectomy: 2007;     oophrectomy;     Laminectomy: cervical  region; at 1,2;  Uterine Ablation    gastric stapling;     shunt ; Procedures: colonoscopy  EGD  and 10-9-17 lexiscan stress test 2014 normal         Family History: adopted (drug abuse)     Father: Medical history unknown     Mother:     ; Positive for colon and esophageal cancer;     Brother(s): 1 brother(s) total    ; Positive for enlarged heart;     Sister(s): 4 sister(s) total    ; Positive for Drug Abuse;         Social History:     Occupation:    Disabled (pseudo tumor/vision loss)     Marital Status:      Children: 3 children         Tobacco/Alcohol/Supplements:     Last Reviewed on 2020 09:24 AM by Tony Cat    Tobacco: She has never smoked.          Alcohol:  Does not drink alcohol and never has.          Substance Abuse History:     Last Reviewed on 2020 09:24 AM by Tony Cat        Mental Health History:     Last Reviewed on 2020 09:24 AM by Tony Cat        Communicable Diseases (eg STDs):     Last Reviewed on 2020 09:24 AM by Tony Cat        Current Problems:     Last Reviewed on 2020 09:24 AM by Tony Cat    Esophageal reflux    Vitamin D deficiency    Vitamin D deficiency, unspecified    Essential (primary) hypertension    HTN    Patient with cerebral ventricle (communicating) shunt, valve, or device in situ    Depression responsive to treatment    Presence of cerebrospinal fluid drainage device    Chronic low back pain    Low back pain    Lower back pain    Joint pain, NEC    Pain in unspecified joint    Radiculopathy, lumbar region    Lumbar radiculopathy    Fatigue    Hyperlipidemia    Other fatigue    Pure hyperglyceridemia    Essential hypertension, benign    Dizziness    Dizziness and giddiness    Unspecified abdominal pain    Abdominal pain, unspecified    Angina pectoris, unspecified    Benign intracranial hypertension        Immunizations:     Td adult 2018    Fluzone (3 + years dose) 2018     Fluzone pf (3+ years dose) 10/5/2019    Fluzone Quadrivalent (3+ years) 11/13/2018        Allergies:     Last Reviewed on 5/28/2020 09:24 AM by Tony Cat    Percodan:      Percocet:      Morphine Sulfate:      MASTAZOLE:      Zoloft:      Latex:      green dye:          Current Medications:     Last Reviewed on 5/28/2020 09:24 AM by Tony Cat    vitamin K     magnesium     Prozac 20 mg oral capsule [Take  1capsule(s) by mouth qam]    Baclofen 10 mg oral tablet    Albuterol 90mcg/1actuation Oral Inhaler [2 puffs  QID  PRN]    glucosamine-chondroitin 750-600 mg oral tablet [1 tab bid]    Singulair  10mg Tablet [Take 1 tablet(s) by mouth each evening]    metoprolol tartrate 25 mg oral tablet [1 TAB DAILY]    hydroCHLOROthiazide 25 mg oral tablet [take 1 tablet (25 mg) by oral route 1 times per day]        Assessment:         I10   Essential (primary) hypertension       G93.2   Benign intracranial hypertension           ORDERS:         Meds Prescribed:       [New Rx] lisinopriL 10 mg oral tablet [take 1 tablet (10 mg) by oral route once daily], #30 (thirty) tablets, Refills: 1 (one)                 Plan:         Essential (primary) hypertension- Not at goal.  Will start lisinopril 10 mg daily.  Continue hydrochlorothiazide 25 mg daily and Lopressor 50 mg daily. Follow-up in 1 month.  Continue to monitor blood pressures every couple days and bring recordings to next visit.    Telehealth: Verbal consent obtained for visit to occur via phone call; Total time spent was 15 minutes; 75075--Nwufozanj E/M 11-20 minutes           Prescriptions:       [New Rx] lisinopriL 10 mg oral tablet [take 1 tablet (10 mg) by oral route once daily], #30 (thirty) tablets, Refills: 1 (one)         Benign intracranial hypertension- Patient is concerned that her  shunt is not fully functional.  Her neurologist is planning further work-up with CT scanning and shunt function study ordered. If the shunt is indeed not functioning fully,  this could be contributing to her headaches as well as her hypertension.            Charge Capture:         Primary Diagnosis:     I10  Essential (primary) hypertension           Orders:      [id number]  Phys/QHP telephone evaluation 11-20 minutes  (In-House)              G93.2  Benign intracranial hypertension

## 2021-05-18 NOTE — PROGRESS NOTES
Marcela Gutierrez  1970     Office/Outpatient Visit    Visit Date: Tue, Apr 13, 2021 02:00 pm    Provider: Tony Cat MD (Assistant: Nuria Berger MA)    Location: [facility]        Electronically signed by Tony Cat MD on  04/13/2021 06:36:54 PM                             Subjective:        CC: [employer] [phone] longer taking PROZACMrs. Matt is a 50 year old White female.  This is a follow-up visit.  Today's encounter is being done with a telehealth visit. She has consented verbally with two witnesses for todays treatment. Todays visit is being conducted by audio and video. Individuals present during the telemedicine consultation include patient and Dr. Cat         HPI:           Mrs. Gutierrez presents with essential (primary) hypertension.  Her current cardiac medication regimen includes a diuretic ( HCTZ 25 mg QD ), a beta-blocker ( Lopressor 50 mg QD ), and an angiotensin receptor blocker ( Losartan 25 mg QD ).  Compliance with treatment has been good; she takes her medication as directed and follows up as directed.  She is tolerating the medication well without side effects.  She has not kept a blood pressure diary, but states that pressures have been okay.            With regard to the acute upper respiratory infection, unspecified, these have been present for the past 5 days.  The symptoms include chest congestion, Chills, cough, fever, nasal discharge and sputum production.  She denies body aches, headache, nasal congestion, sinus pain/pressure or wheezing.  She denies exposure to ill contacts.  She has already tried to relieve the symptoms with medrol dose shashank, amoxicilln and albuterol.  Medical history is significant for allergies.      ROS:     CONSTITUTIONAL:  Positive for chills and fever.   Negative for fatigue.      EYES:  Negative for blurred vision.      E/N/T:  Positive for nasal congestion and frequent rhinorrhea.   Negative for sinus  pressure or sore throat.      CARDIOVASCULAR:  Negative for chest pain, dizziness, palpitations and edema.      RESPIRATORY:  Positive for cough and chest congestion.   Negative for dyspnea.      GASTROINTESTINAL:  Negative for abdominal pain, diarrhea, heartburn, nausea and vomiting.      MUSCULOSKELETAL:  Negative for arthralgias and myalgias.      NEUROLOGICAL:  Positive for headaches.   Negative for paresthesias or weakness.          Past Medical History / Family History / Social History:         Last Reviewed on 2021 06:36 PM by Tony Cat    Past Medical History: cva in her 30s         pseudotumor cerebri/loss vision right eye     bulemia    vit D def    HTN    GERD             PAST MEDICAL HISTORY             CURRENT MEDICAL PROVIDERS:    Cardiologist    Gastroenterologist: steven    Neurologist: dr upton         PREVENTIVE HEALTH MAINTENANCE         unknown pneumonia shot  once between  -      BONE DENSITY: was last done 2019 with the following abnormality noted-- mild osteopenia lumbar spine     COLORECTAL CANCER SCREENING:; 10/9/17     EYE EXAM: was last done -      MAMMOGRAM: Done within last 2 years and results in are chart was last done 19 with normal results     PAP SMEAR: was last done Hysterectomy [facility] BSO  for heavy bleeding with normal results         PAST MEDICAL HISTORY             CURRENT MEDICAL PROVIDERS:    Gastroenterologist: SREEKANTH GURROLA    Neurologist: BART    Ophthalmologist:     Orthopedist: DORIAN         Surgical History:         Appendectomy    Cholecystectomy    Hysterectomy: ;     oophrectomy;     Laminectomy: cervical region; at 1,2;  Uterine Ablation    gastric stapling;     shunt ; Procedures: colonoscopy  EGD  and 10-9-17 lexiscan stress test 2014 normal         Family History: adopted (drug abuse)     Father: Medical history unknown     Mother:     ; Positive for colon and esophageal cancer;      Brother(s): 1 brother(s) total    ; Positive for enlarged heart;     Sister(s): 4 sister(s) total    ; Positive for Drug Abuse;         Social History:     Occupation:    Disabled (pseudo tumor/vision loss)     Marital Status:      Children: 3 children         Tobacco/Alcohol/Supplements:     Last Reviewed on 4/13/2021 06:36 PM by Tony Cat    Tobacco: She has never smoked.          Alcohol:  Does not drink alcohol and never has.          Substance Abuse History:     Last Reviewed on 4/13/2021 06:36 PM by Tony Cat        Mental Health History:     Last Reviewed on 4/13/2021 06:36 PM by Tony Cat        Communicable Diseases (eg STDs):     Last Reviewed on 4/13/2021 06:36 PM by Tony Cat        Current Problems:     Last Reviewed on 4/13/2021 06:36 PM by Tony Cat    Esophageal reflux    Vitamin D deficiency    Vitamin D deficiency, unspecified    Essential (primary) hypertension    HTN    Patient with cerebral ventricle (communicating) shunt, valve, or device in situ    Presence of cerebrospinal fluid drainage device    Chronic low back pain    Depression responsive to treatment    Lower back pain    Low back pain    Pain in unspecified joint    Joint pain, NEC    Lumbar radiculopathy    Radiculopathy, lumbar region    Pure hyperglyceridemia    Fatigue    Hyperlipidemia    Other fatigue    Essential hypertension, benign    Dizziness    Dizziness and giddiness    Unspecified abdominal pain    Abdominal pain, unspecified    Angina pectoris, unspecified    Benign intracranial hypertension    Localized swelling, mass and lump, right lower limb    Encounter for screening for depression    Jaw pain    Pain in right shoulder    Dysuria    Acute upper respiratory infection, unspecified        Immunizations:     influenza, injectable, quadrivalent, preservative free (FLUZONE QUAD [id number]) 10/3/2020    Td adult 11/13/2018    Fluzone (3 + years dose) 1/25/2018    Fluzone pf (3+ years dose)  10/5/2019    Fluzone Quadrivalent (3+ years) 11/13/2018        Allergies:     Last Reviewed on 4/13/2021 06:36 PM by Tony Cat    Percodan:      Percocet:      Morphine Sulfate:      MASTAZOLE:      Zoloft:      Latex:      green dye:      lisinopriL: Dry cough  (Adverse Reaction)        Current Medications:     Last Reviewed on 4/13/2021 06:36 PM by Tony Cat    vitamin K     magnesium     metoprolol tartrate 50 mg oral tablet [take 1 tablet (50 mg) by oral route 1 time per day with meals]    topiramate 100 mg oral tablet [take 1 tablet (100 mg) by oral route once daily]    albuterol sulfate 90 mcg/actuation Inhalation HFA Aerosol Inhaler [2 puffs  QID  PRN]    glucosamine-chondroitin 750-600 mg oral tablet [1 tab bid]    Singulair  10mg Tablet [Take 1 tablet(s) by mouth each evening]    hydroCHLOROthiazide 25 mg oral tablet [TAKE ONE TABLET BY MOUTH DAILY]    losartan 25 mg oral tablet [take 1 tablet (25 mg) by oral route 1 time per day]        Objective:        Exams:     PHYSICAL EXAM:     GENERAL: vital signs recorded - well developed, well nourished;  no apparent distress;     EYES: conjunctiva and cornea are normal;     RESPIRATORY: normal respiratory rate and pattern with no distress;     BREAST/INTEGUMENT: No significant rashes, lesions or suspicious moles within limits of examination/encounter;     NEUROLOGIC: Grossly intact; mental status: alert and oriented x 3; cranial nerves II-XII grossly intact; Reflexes: knee jerks: 2+;     PSYCHIATRIC: appropriate affect and demeanor; normal speech pattern; Normal behavior;         Assessment:         I10   Essential (primary) hypertension       J06.9   Acute upper respiratory infection, unspecified           ORDERS:         Radiology/Test Orders:       [id number]  Radiologic exam chest 2 views  (Send-Out)                      Plan:         Essential (primary) hypertension- Stable.  Continue losartan 25 mg daily, hydrochlorothiazide 25 mg daily and Toprol-XL 50  mg daily        Acute upper respiratory infection, unspecified- Continue currently prescribed antibiotic and steroid courses to completion.  Chest x-ray ordered today for further evaluation of pneumonia.  If unremarkable, return to clinic as needed for persistent or worsening symptoms.  ED/return precautions given.        FOLLOW-UP TESTING #1:    RADIOLOGY:  I have ordered a chest x-ray (PA and lateral) to be done today.            Orders:       [id number]  Radiologic exam chest 2 views  (Send-Out)                  Charge Capture:         Primary Diagnosis:     I10  Essential (primary) hypertension           Orders:      [id number]  Office/outpatient visit; established patient, level 4  (In-House)              J06.9  Acute upper respiratory infection, unspecified         ADDENDUMS:      ____________________________________    Addendum: 04/15/2021 05:23 PM - Tony Cat        Orders:    REMOVE   [id number]     ADD   [id number]    -[email or web address]

## 2021-05-18 NOTE — PROGRESS NOTES
"Marcela Gutierrez  1970     Office/Outpatient Visit    Visit Date: Sat, Apr 17, 2021 11:37 am    Provider: Tony Romo MD (Assistant: Lluvia Alford MA)    Location: [facility]        Electronically signed by Tony Romo MD on  04/17/2021 04:02:20 PM                             Subjective:        CC: Mrs. Gutierrez is a 50 year old White female.  This is a follow-up visit.  not feeling better from 4-13-21 telehealth visit; pt says she has not gotten the cough med or nebulizer med yet, says it isnt at pharmacy;         HPI:           Mrs. Gutierrez presents with acute upper respiratory infection, unspecified.  These have been present for the past 2 weeks.  The symptoms include cough, nasal discharge and purulent sputum production.  She denies fever.  She denies exposure to ill contacts.  Was seen at urgent care over a week ago and dx ear infection and sinusitis.Was treated with steroids , flonase, and Amoxil.  Sunday started running 102 temp.  Feeling very weak and coughing. Hurting all over \"like the flu\"She will have chills and sweats. Had not been vaccinated for covid. Has been around grandchildren, and going to [employer]. She had a telehealth visit with Dr. Cat the other day and he sent in prescriptions but she thought they were going to [facility] pharmacy he sent them to [facility] pharmacy so she has not picked them up yet    ROS:     CONSTITUTIONAL:  Negative for chills, fatigue, fever, and weight change.      EYES:  Negative for blurred vision.      CARDIOVASCULAR:  Negative for chest pain, orthopnea, paroxysmal nocturnal dyspnea and pedal edema.      RESPIRATORY:  Negative for dyspnea and sputum.      GASTROINTESTINAL:  Negative for abdominal pain, constipation, diarrhea, nausea and vomiting.      GENITOURINARY:  Negative for dysuria and frequent urination.      NEUROLOGICAL:  Positive for weakness.      PSYCHIATRIC:  Negative for anxiety, depression, and sleep disturbances.  "         Past Medical History / Family History / Social History:         Last Reviewed on 2021 06:36 PM by Tony Cat    Past Medical History: cva in her 30s         pseudotumor cerebri/loss vision right eye     bulemia    vit D def    HTN    GERD             PAST MEDICAL HISTORY             CURRENT MEDICAL PROVIDERS:    Cardiologist    Gastroenterologist: steven    Neurologist: dr upton         PREVENTIVE HEALTH MAINTENANCE         unknown pneumonia shot  once between  -      BONE DENSITY: was last done 2019 with the following abnormality noted-- mild osteopenia lumbar spine     COLORECTAL CANCER SCREENING:; 10/9/17     EYE EXAM: was last done -      MAMMOGRAM: Done within last 2 years and results in are chart was last done 19 with normal results     PAP SMEAR: was last done Hysterectomy [facility] BSO  for heavy bleeding with normal results         PAST MEDICAL HISTORY             CURRENT MEDICAL PROVIDERS:    Gastroenterologist: SREEKANTH GURROLA    Neurologist: BART    Ophthalmologist:     Orthopedist: DORIAN         Surgical History:         Appendectomy    Cholecystectomy    Hysterectomy: ;     oophrectomy;     Laminectomy: cervical region; at 1,2;  Uterine Ablation    gastric stapling;     shunt ; Procedures: colonoscopy  EGD  and 10-9-17 lexiscan stress test 2014 normal         Family History: adopted (drug abuse)     Father: Medical history unknown     Mother:     ; Positive for colon and esophageal cancer;     Brother(s): 1 brother(s) total    ; Positive for enlarged heart;     Sister(s): 4 sister(s) total    ; Positive for Drug Abuse;         Social History:     Occupation:    Disabled (pseudo tumor/vision loss)     Marital Status:      Children: 3 children         Tobacco/Alcohol/Supplements:     Last Reviewed on 2021 06:36 PM by Tony Cat    Tobacco: She has never smoked.          Alcohol:  Does not drink alcohol and  never has.          Substance Abuse History:     Last Reviewed on 4/13/2021 06:36 PM by Tony Cat        Mental Health History:     Last Reviewed on 4/13/2021 06:36 PM by Tony Cat        Communicable Diseases (eg STDs):     Last Reviewed on 4/13/2021 06:36 PM by Tony Cat        Allergies:     Last Reviewed on 4/13/2021 06:36 PM by Tony Cat    Percodan:      Percocet:      Morphine Sulfate:      MASTAZOLE:      Zoloft:      Latex:      green dye:      lisinopriL: Dry cough  (Adverse Reaction)        Current Medications:     Last Reviewed on 4/13/2021 06:36 PM by Tony Cat    vitamin K     magnesium     metoprolol tartrate 50 mg oral tablet [take 1 tablet (50 mg) by oral route 1 time per day with meals]    topiramate 100 mg oral tablet [take 1 tablet (100 mg) by oral route once daily]    albuterol sulfate 90 mcg/actuation Inhalation HFA Aerosol Inhaler [2 puffs  QID  PRN]    glucosamine-chondroitin 750-600 mg oral tablet [1 tab bid]    Singulair  10mg Tablet [Take 1 tablet(s) by mouth each evening]    hydroCHLOROthiazide 25 mg oral tablet [TAKE ONE TABLET BY MOUTH DAILY]    losartan 25 mg oral tablet [take 1 tablet (25 mg) by oral route 1 time per day]    nebulizer accessories kit [use with tubing/mask for mini neb treatments qid for Jo6.9]    nebulizer and compressor [use for mini neb treatments qid for j06.9]    ipratropium-albuteroL 0.5 mg-3 mg(2.5 mg base)/3 mL Inhalation Solution for Nebulization [inhale 3 milliliters by nebulization route 4 times per day for j06.9]    promethazine-DM 6.25-15 mg/5 mL oral Syrup [take 5 milliliters by oral route every 4 hours as needed, not to exceed 30 mL in 24 hours for cough]        Objective:        Vitals:         Current: 4/17/2021 11:46:12 AM    Ht:  5 ft, 3.5 in;  Wt: 244.8 lbs;  BMI: 42.7T: 99.2 F (oral);  BP: 105/72 mm Hg (left arm, sitting);  P: 89 bpm (left arm (BP Cuff), sitting);  sCr: 0.74 mg/dL;  GFR: 107.87O2 Sat: 97 % (room air)         Exams: Does not appear to be in acute distress.  Does have a raspy sounding cough that comes on in paroxysms.  Tympanic membranes clear.  Oropharynx clear.  Heart regular rhythm no murmur.  Lungs get pretty good air movement without wheezes or crackles, but deep breathing does promote coughing paroxysm        Lab/Test Results:         Rapid SARS: Positive (04/17/2021),     Performed by: dylon (04/17/2021),     Influenza A and B: Negative (04/17/2021),     Performed by:: dylon (04/17/2021),             Assessment:         J06.9   Acute upper respiratory infection, unspecified           ORDERS:         Lab Orders:       [id number]  Infectious agent antigen detection by immunoassay; Influenza  (In-House)            [id number]  Infectious agent antigen detection by immunoassay; Influenza  (In-House)            [id number]  Coronavirus antigen detection by immunoassay technique, COVID-19  (In-House)                      Plan:         Acute upper respiratory infection, unspecifiedshe will  neb rx and cough syrup at [facility].  Given handout regarding covid testing resutls.  Importance of isolation emphasized.  Advised to inform anyone she has been in contact with of her positivity.Since she is beyond a 5-day window she is not a candidate for monoclonal antibody.Explained to her the importance of seeking care if she were to develop shortness of breath, severe chest pain, or turning blue around the lips.Call back if she has further issues or questions    LABORATORY:  Labs ordered to be performed today include Flu A&B Flu A Flu B.            Orders:       [id number]  Infectious agent antigen detection by immunoassay; Influenza  (In-House)            [id number]  Infectious agent antigen detection by immunoassay; Influenza  (In-House)            [id number]  Coronavirus antigen detection by immunoassay technique, COVID-19  (In-House)                  Other Patient Education Handouts:     Dragon transcription disclaimer:        Much of this encounter  note is an electronic transcription/translation of spoken language to printed text.  The electronic translation of spoken language may permit erroneous, or at times, nonsensical words or phrases to be inadvertently transcribed.  Although I have reviewed the note for such errors, some may still exist.        Charge Capture:         Primary Diagnosis:     J06.9  Acute upper respiratory infection, unspecified           Orders:      [id number]  Office/outpatient visit; established patient, level 3  (In-House)            [id number]  Infectious agent antigen detection by immunoassay; Influenza  (In-House)            [id number]  Infectious agent antigen detection by immunoassay; Influenza  (In-House)            [id number]  Coronavirus antigen detection by immunoassay technique, COVID-19  (In-House)

## 2021-05-18 NOTE — PROGRESS NOTES
Marcela Greenberg 1970     Office/Outpatient Visit    Visit Date: Sat, Jun 29, 2019 11:44 am    Provider: Elly June N.P. (Assistant: Melia Plasencia LPN)    Location: [facility]        Electronically signed by Elly June N.P. on  06/29/2019 12:33:08 PM                             SUBJECTIVE:        CC: ema eyes irritated and with drainage,  cough, some nasal drainage, no fever         HPI:         Patient to be evaluated for upper respiratory illness.  These have been present for the past 5 days.  The symptoms include eye itching/watering, nasal congestion and fatigue.  She has already tried to relieve the symptoms with OTC eye drops for irritated eyes.  Has not been taking Zyrtec.      ROS:     CONSTITUTIONAL:  Negative for chills and fever.      EYES:  Positive for eye drainage ( clear bilateral eye drainage ), no changes in vision but does have decreased vision in right eye normally and itching.   Negative for eye pain.      E/N/T:  Positive for nasal congestion.   Negative for sore throat.      CARDIOVASCULAR:  Negative for chest pain and palpitations.      RESPIRATORY:  Negative for dyspnea and frequent wheezing.          PMH/FM/SH:     Last Reviewed on 2/16/2019 11:58 AM by Madelin Espinal    Past Medical History: cva in her 30s         pseudotumor cerebri/loss vision right eye     bulemia    vit D def    HTN    GERD             PAST MEDICAL HISTORY             CURRENT MEDICAL PROVIDERS:    Gastroenterologist: steven    Neurologist: dr upton         PREVENTIVE HEALTH MAINTENANCE         unknown pneumonia shot  once between 2011 - 2013     BONE DENSITY: was last done jan 2019 with the following abnormality noted-- mild osteopenia lumbar spine     COLORECTAL CANCER SCREENING:; 10/9/17     EYE EXAM: was last done 2016- glasses     MAMMOGRAM: Done within last 2 years and results in are chart was last done 1-29-19 with normal results     PAP SMEAR: was last done Hysterectomy [facility] 2007  for heavy bleeding with normal results         PAST MEDICAL HISTORY             CURRENT MEDICAL PROVIDERS:    Gastroenterologist: SREEKANTH GURROLA    Neurologist: BART    Ophthalmologist:     Orthopedist: DORIAN         Surgical History:         Appendectomy    Cholecystectomy    Hysterectomy: ;     oophrectomy;      Laminectomy: cervical region; at 1,2;  Uterine Ablation    gastric stapling;     shunt ; Procedures: colonoscopy  EGD  lexiscan stress test 2014 normal         Family History: adopted (drug abuse)     Father: Medical history unknown     Mother:     ; Positive for colon and esophageal cancer;     Brother(s): 1 brother(s) total    ; Positive for enlarged heart;     Sister(s): 4 sister(s) total    ; Positive for Drug Abuse;         Social History:     Occupation:    Disabled (pseudo tumor/vision loss)     Marital Status:      Children: 3 children         Tobacco/Alcohol/Supplements:     Last Reviewed on 2019 11:18 AM by Spurling, Sarah C    Tobacco: She has never smoked.          Alcohol:  Does not drink alcohol and never has.          Substance Abuse History:     Last Reviewed on 3/02/2018 03:35 PM by Gina Perry        Mental Health History:     Last Reviewed on 3/02/2018 03:35 PM by Gina Perry        Communicable Diseases (eg STDs):     Last Reviewed on 3/02/2018 03:35 PM by Gina Perry            Current Problems:     Last Reviewed on 3/02/2018 03:35 PM by Gina Perry    Fatigue     Hyperlipidemia     Lumbar radiculopathy     Joint pain, NEC     Lower back pain     Chronic low back pain     Patient with cerebral ventricle (communicating) shunt, valve, or device in situ     Depression responsive to treatment     HTN     Vitamin D deficiency     Esophageal reflux     Foot pain         Immunizations:     Td adult 2018     Fluzone (3 + years dose) 2018     Fluzone Quadrivalent (3+ years) 2018         Allergies:      Last Reviewed on 6/06/2019 11:18 AM by Spurling, Sarah C    Percodan:    Percocet:    Morphine Sulfate:    Zoloft:    MASTAZOLE:    Latex:    green dye:        Current Medications:     Last Reviewed on 6/06/2019 11:19 AM by Spurling, Sarah C    Losartan 25mg Tablet Take 1 tablet(s) by mouth daily     Prozac 20mg Capsules Take  1capsule(s) by mouth qam     Albuterol 90mcg/1actuation Oral Inhaler 2 puffs  QID  PRN     Baclofen 10mg Tablet     Meloxicam 15mg Tablet 1 po QD with food     Glucosamine and Chondroitin ES 750mg/600mg Tablet 1 tab bid     Ranitidine 150mg Capsules 1 po qod     vITAMIN D3 20,000 Q DAY     Zyrtec 10mg Tablet 1 tab daily         OBJECTIVE:        Vitals:         Current: 6/29/2019 11:52:21 AM    Ht:  5 ft, 3.5 in;  Wt: 263 lbs;  BMI: 45.9    T: 97.3 F (oral);  BP: 148/77 mm Hg (left arm, sitting);  P: 81 bpm (left arm (BP Cuff), sitting);  sCr: 0.75 mg/dL;  GFR: 110.94    VA: 20/100 OD, 20/20 OS (near, with correction)        Repeat:     12:18:24 PM     BP:   126/75mm Hg (left arm, sitting)         Exams:     PHYSICAL EXAM:     GENERAL: well developed, well nourished;  no apparent distress;     EYES: lids and lacrimal system are normal in appearance; extraocular movements intact; conjunctiva and cornea are normal; pupils and irises are normal; cobblestoning noted to bilateral lower eyelids;     E/N/T: EARS: external auditory canal normal;  both TMs are dull;  NOSE: normal turbinates; no sinus tenderness; OROPHARYNX: oral mucosa is normal; posterior pharynx shows no exudate and post nasal drip;     NECK: range of motion is normal; trachea is midline;     RESPIRATORY: normal respiratory rate and pattern with no distress; normal breath sounds with no rales, rhonchi, wheezes or rubs;     CARDIOVASCULAR: normal rate; rhythm is regular;     MUSCULOSKELETAL: normal gait;     NEUROLOGIC: mental status: alert and oriented x 3; GROSSLY INTACT     PSYCHIATRIC: appropriate affect and demeanor;          ASSESSMENT           465.9   J06.9  Acute upper respiratory infection              DDx:         ORDERS:         Meds Prescribed:       Zaditor (Ketotifen) 0.025% Ophthalmic Solution Instill 1 drop(s) to affected eye(s) q12h  #1 (One) bottle Refills: 0                 PLAN:          Acute upper respiratory infection Resume antihistamines. Follow up with eye doctor if eye symptoms persist or worsen.         RECOMMENDATIONS given include: Push Fluids, Rest, Follow up if no improvement or worsening symptoms like high fevers, vomiting, weakness, or increasing shortness of air.    .      FOLLOW-UP: Chronic visit follow up           Prescriptions:       Zaditor (Ketotifen) 0.025% Ophthalmic Solution Instill 1 drop(s) to affected eye(s) q12h  #1 (One) bottle Refills: 0             CHARGE CAPTURE           **Please note: ICD descriptions below are intended for billing purposes only and may not represent clinical diagnoses**        Primary Diagnosis:         465.9 Acute upper respiratory infection            J06.9    Acute upper respiratory infection, unspecified              Orders:          44783   Office/outpatient visit; established patient, level 3  (In-House)

## 2021-05-18 NOTE — PROGRESS NOTES
Patient ambulated to ED bed 12 to visit with wife, steady gait noted. YariMarcelaAlly 1970     Office/Outpatient Visit    Visit Date: Sat, Jul 6, 2019 11:14 am    Provider: Tom Lemus MD (Assistant: Ozzy Grewal)    Location: [facility]        Electronically signed by Tom Lemus MD on  07/06/2019 05:35:24 PM                             SUBJECTIVE:        CC: eye drainage         HPI:     Marcela is in today for follow up on watering of her eyes.  She has been dealing with this for the last  week.  She has had some stuffy nose with this.  She says that she has noted an enlarged lymph node on the L side of her neck and tongue pain.  She also had pain with swallowing.  She says that did get better with drinking water and eventually improved this morning.  She also has history of pseudotumor cerebri.  She does have a shunt in place for that and has had that for 17 years.  She is on some eye drops that were given by nurse practitioner here in the last week or so.  Marcela is concerned about the various symptoms and wanted to be evaluated.  She does have allergies and does take Zyrtec.  She has been back on that for about a week.     ROS:     CONSTITUTIONAL:  Negative for chills and fever.      CARDIOVASCULAR:  Negative for chest pain and palpitations.      RESPIRATORY:  Negative for recent cough and dyspnea.      GASTROINTESTINAL:  Negative for abdominal pain, nausea and vomiting.          PMH/FMH/:     Last Reviewed on 7/06/2019 11:24 AM by Tom Lemus    Past Medical History: cva in her 30s         pseudotumor cerebri/loss vision right eye     bulemia    vit D def    HTN    GERD             PAST MEDICAL HISTORY             CURRENT MEDICAL PROVIDERS:    Gastroenterologist: steven    Neurologist: dr upton         PREVENTIVE HEALTH MAINTENANCE         unknown pneumonia shot  once between 2011 - 2013     BONE DENSITY: was last done jan 2019 with the following abnormality noted-- mild osteopenia lumbar spine     COLORECTAL CANCER SCREENING:; 10/9/17      EYE EXAM: was last done -      MAMMOGRAM: Done within last 2 years and results in are chart was last done 19 with normal results     PAP SMEAR: was last done Hysterectomy [facility]  for heavy bleeding with normal results         PAST MEDICAL HISTORY             CURRENT MEDICAL PROVIDERS:    Gastroenterologist: SREEKANTH GURROLA    Neurologist: BART    Ophthalmologist:     Orthopedist: DORIAN         Surgical History:         Appendectomy    Cholecystectomy    Hysterectomy: ;     oophrectomy;      Laminectomy: cervical region; at 1,2;  Uterine Ablation    gastric stapling;     shunt ; Procedures: colonoscopy  EGD  lexiscan stress test 2014 normal         Family History: adopted (drug abuse)     Father: Medical history unknown     Mother:     ; Positive for colon and esophageal cancer;     Brother(s): 1 brother(s) total    ; Positive for enlarged heart;     Sister(s): 4 sister(s) total    ; Positive for Drug Abuse;         Social History:     Occupation:    Disabled (pseudo tumor/vision loss)     Marital Status:      Children: 3 children         Tobacco/Alcohol/Supplements:     Last Reviewed on 2019 11:24 AM by Tom Lemus    Tobacco: She has never smoked.          Alcohol:  Does not drink alcohol and never has.          Substance Abuse History:     Last Reviewed on 2019 11:24 AM by Tom Lemus        Mental Health History:     Last Reviewed on 2019 11:24 AM by Tom Lemus        Communicable Diseases (eg STDs):     Last Reviewed on 2019 11:24 AM by Tom Lemus            Current Problems:     Last Reviewed on 2019 11:24 AM by Tom Lemus    Fatigue     Hyperlipidemia     Lumbar radiculopathy     Joint pain, NEC     Lower back pain     Chronic low back pain     Patient with cerebral ventricle (communicating) shunt, valve, or device in situ     Depression responsive to treatment      HTN     Vitamin D deficiency     Esophageal reflux     Acute upper respiratory infection     Foot pain         Immunizations:     Td adult 11/13/2018     Fluzone (3 + years dose) 1/25/2018     Fluzone Quadrivalent (3+ years) 11/13/2018         Allergies:     Last Reviewed on 7/06/2019 11:24 AM by Tom Lemus    Percodan:    Percocet:    Morphine Sulfate:    Zoloft:        Current Medications:     Last Reviewed on 7/06/2019 11:24 AM by Tom Lemus    Prozac 20mg Capsules Take  1capsule(s) by mouth qam     Albuterol 90mcg/1actuation Oral Inhaler 2 puffs  QID  PRN     Baclofen 10mg Tablet     Hydrochlorothiazide (HCTZ) 12.5mg Tablet 1 po daily     Zaditor 0.025% Ophthalmic Solution Instill 1 drop(s) to affected eye(s) q12h     Glucosamine and Chondroitin ES 750mg/600mg Tablet 1 tab bid     Ranitidine 150mg Capsules 1 po qod     vITAMIN D3 20,000 Q DAY     Zyrtec 10mg Tablet 1 tab daily         OBJECTIVE:        Vitals:         Current: 7/6/2019 11:20:59 AM    Ht:  5 ft, 3.5 in;  Wt: 266 lbs;  BMI: 46.4    T: 98.4 F (oral);  BP: 141/82 mm Hg (right arm, sitting);  P: 86 bpm (right arm (BP Cuff), sitting);  sCr: 0.75 mg/dL;  GFR: 111.48        Exams:     PHYSICAL EXAM:     GENERAL: vital signs recorded - well developed, well nourished;  no apparent distress;     EYES: extraocular movements intact; there is mild conjunctival injection bilaterally - no exudate is noted this morning;  pupils are symmetric;     E/N/T: EARS:  normal external auditory canals and tympanic membranes;  grossly normal hearing; OROPHARYNX:  normal mucosa, dentition, gingiva, and posterior pharynx;     NECK: range of motion is normal; thyroid is non-palpable; there may be some mild enlargement of the submandibular salivary gland;     RESPIRATORY: normal respiratory rate and pattern with no distress; normal breath sounds with no rales, rhonchi, wheezes or rubs;     CARDIOVASCULAR: normal rate; rhythm is regular;  no systolic  murmur; no edema;     GASTROINTESTINAL: nontender; normal bowel sounds; no organomegaly;     LYMPHATIC: no enlargement of cervical or facial nodes; no supraclavicular nodes;     BREAST/INTEGUMENT: SKIN: no significant rashes or lesions; no suspicious moles;     NEUROLOGIC: mental status: alert and oriented x 3; cranial nerves II-XII grossly intact;     PSYCHIATRIC: appropriate affect and demeanor; normal psychomotor function;         ASSESSMENT           375.21   H10.13  Watery eyes              DDx:     462   J06.9  Sore throat              DDx:     785.6   R22.1  Swollen lymph nodes              DDx:         ORDERS:         Meds Prescribed:       Singulair  (Montelukast Sodium) 10mg Tablet Take 1 tablet(s) by mouth each evening  #30 (Thirty) tablet(s) Refills: 0         Other Orders:         Depression screen negative  (In-House)           Negative EtOH screen  (In-House)                   PLAN:          Watery eyes         RECOMMENDATIONS given include: There is not clearly a worrisome finding on exam.  Her eyes are mildly injected.  I am going to recommend referral back to see the eye doctor if this does not improve fairly quickly.  She has this unusual history of pseudotumor, and I don't think that is an issue, but it is not something I see commonly.  We will advise a trial of Singulair to see if it helps with what may be allergy issues.  We will make no changes otherwise for now.  The throat seems okay.  I think that a salivary gland may be mildly enlarged on the L side.  I did advise she take in some water with lemon to see if it may be helpful..  MIPS PHQ-9 Depression Screening: Completed form scanned and in chart; Total Score 3; Negative Depression Screen Negative alcohol screen           Prescriptions:       Singulair  (Montelukast Sodium) 10mg Tablet Take 1 tablet(s) by mouth each evening  #30 (Thirty) tablet(s) Refills: 0           Orders:         Depression screen negative  (In-House)            Negative EtOH screen  (In-House)             Patient Education Handouts:       Allergies           Sore throat As above.          Swollen lymph nodes As above.             CHARGE CAPTURE           **Please note: ICD descriptions below are intended for billing purposes only and may not represent clinical diagnoses**        Primary Diagnosis:         375.21 Watery eyes            H10.13    Acute atopic conjunctivitis, bilateral              Orders:          [id number]   Office/outpatient visit; established patient, level 4  (In-House)                Depression screen negative  (In-House)                Negative EtOH screen  (In-House)           462 Sore throat            J06.9    Acute upper respiratory infection, unspecified    785.6 Swollen lymph nodes            R22.1    Localized swelling, mass and lump, neck

## 2021-05-18 NOTE — PROGRESS NOTES
Marcela Gutierrez  1970     Office/Outpatient Visit    Visit Date: Fri, May 1, 2020 04:27 pm    Provider: Tony Cat MD (Assistant: Quiana Barnes MA)    Location: [facility]        Electronically signed by Tony Cat MD on  05/27/2020 06:21:35 PM                             Subjective:        CC: Mrs. Gutierrez is a 49 year old White female.  hypertension during stress test; [facility] [phone].    .     TELEMEDICINE VISIT:    - Patient consented to this telemedicine visit. Consent obtained by Quiana Barnes MA and Tony Cat MD.    - Persons present during the telemedicine consultation include:  Patient, Dr. Cat    - This visit is being conducted via [employer]  with both audio and video.Today's encounter is being done with a telehealth visit. She has consented verbally with two witnesses for todays treatment. Todays visit is being conducted by audio and video. Individuals present during the telemedicine consultation include Patient and Dr. Cat         HPI: At last visit, patient complained of an episode of chest pain for which she had went to the emergency department.  Work-up at the emergency department including troponin, CMP, CBC, chest x-ray and EKG was unremarkable.  The incident startled her and made her worried that she had something wrong with her heart.  In the past, she has had episode of palpitations and chest pain for which she had a stress test in 2014 that was negative.  Given her recurrence of symptoms, she was referred to cardiology.  The cardiologist recommended further testing including an echocardiogram and a repeat stress test.  Stress test was normal.  Echo showed LVH and an enlarged left atrium, both of which are likely secondary to hypertension, but was otherwise unremarkable. However, during her stress test the patient reports that she became markedly hypertensive to 200 systolic.  The nurse administering the test recommended she follow-up with her  PCP as soon as possible.  Since that time, she has been checking her blood pressure regularly at home and notices that she has had several readings in the 150s to 160s systolic and 90s to 100s diastolic. Her only current blood pressure medication is Lopressor which she takes 25 mg daily.  She says when her blood pressure is elevated she will have mild headaches but no changes in vision.  No chest pain since her most recent episode.  No shortness of breath or edema.    ROS:     CONSTITUTIONAL:  Negative for chills, fatigue, fever, and weight change.      EYES:  Negative for blurred vision.      CARDIOVASCULAR:  Negative for chest pain, dizziness, palpitations and edema.      RESPIRATORY:  Negative for dyspnea and cough.      GASTROINTESTINAL:  Negative for abdominal pain, diarrhea, heartburn, nausea and vomiting.      NEUROLOGICAL:  Positive for headaches.   Negative for paresthesias or weakness.          Past Medical History / Family History / Social History:         Last Reviewed on 5/27/2020 06:21 PM by Tony Cat    Past Medical History: cva in her 30s         pseudotumor cerebri/loss vision right eye     bulemia    vit D def    HTN    GERD             PAST MEDICAL HISTORY             CURRENT MEDICAL PROVIDERS:    Cardiologist    Gastroenterologist: steven    Neurologist: dr upton         PREVENTIVE HEALTH MAINTENANCE         unknown pneumonia shot  once between 2011 - 2013     BONE DENSITY: was last done jan 2019 with the following abnormality noted-- mild osteopenia lumbar spine     COLORECTAL CANCER SCREENING:; 10/9/17     EYE EXAM: was last done 2016- glasses     MAMMOGRAM: Done within last 2 years and results in are chart was last done 1-29-19 with normal results     PAP SMEAR: was last done Hysterectomy [facility] 2007 for heavy bleeding with normal results         PAST MEDICAL HISTORY             CURRENT MEDICAL PROVIDERS:    Gastroenterologist: SREEKANTH GURROLA    Neurologist: BART    Ophthalmologist:      Orthopedist: DORIAN         Surgical History:         Appendectomy    Cholecystectomy    Hysterectomy: ;     oophrectomy;     Laminectomy: cervical region; at 1,2;  Uterine Ablation    gastric stapling;     shunt ; Procedures: colonoscopy  EGD  and 10-9-17 lexiscan stress test 2014 normal         Family History: adopted (drug abuse)     Father: Medical history unknown     Mother:     ; Positive for colon and esophageal cancer;     Brother(s): 1 brother(s) total    ; Positive for enlarged heart;     Sister(s): 4 sister(s) total    ; Positive for Drug Abuse;         Social History:     Occupation:    Disabled (pseudo tumor/vision loss)     Marital Status:      Children: 3 children         Tobacco/Alcohol/Supplements:     Last Reviewed on 2020 06:21 PM by Tony Cat    Tobacco: She has never smoked.          Alcohol:  Does not drink alcohol and never has.          Substance Abuse History:     Last Reviewed on 2020 06:21 PM by Tony Cat        Mental Health History:     Last Reviewed on 2020 06:21 PM by Tony Cat        Communicable Diseases (eg STDs):     Last Reviewed on 2020 06:21 PM by Tony Cat        Current Problems:     Last Reviewed on 2020 06:21 PM by Tony Cat    Esophageal reflux    Vitamin D deficiency    Vitamin D deficiency, unspecified    Essential (primary) hypertension    HTN    Patient with cerebral ventricle (communicating) shunt, valve, or device in situ    Depression responsive to treatment    Presence of cerebrospinal fluid drainage device    Chronic low back pain    Low back pain    Lower back pain    Joint pain, NEC    Pain in unspecified joint    Radiculopathy, lumbar region    Lumbar radiculopathy    Fatigue    Hyperlipidemia    Other fatigue    Pure hyperglyceridemia    Essential hypertension, benign    Dizziness    Dizziness and giddiness    Unspecified abdominal pain    Abdominal pain,  unspecified    Angina pectoris, unspecified        Immunizations:     Td adult 11/13/2018    Fluzone (3 + years dose) 1/25/2018    Fluzone pf (3+ years dose) 10/5/2019    Fluzone Quadrivalent (3+ years) 11/13/2018        Allergies:     Last Reviewed on 5/27/2020 06:21 PM by Tony Cat    Percodan:      Percocet:      Morphine Sulfate:      MASTAZOLE:      Zoloft:      Latex:      green dye:          Current Medications:     Last Reviewed on 5/27/2020 06:21 PM by Tony Cat    vitamin K     magnesium     Prozac 20 mg oral capsule [Take  1capsule(s) by mouth qam]    Baclofen 10 mg oral tablet    Albuterol 90mcg/1actuation Oral Inhaler [2 puffs  QID  PRN]    vITAMIN D3 20,000 Q DAY     glucosamine-chondroitin 750-600 mg oral tablet [1 tab bid]    Singulair  10mg Tablet [Take 1 tablet(s) by mouth each evening]    metoprolol tartrate 25 mg oral tablet [1 TAB DAILY]        Objective:        Exams:     PHYSICAL EXAM:     GENERAL: vital signs recorded - well developed, well nourished;  no apparent distress;     EYES: conjunctiva and cornea are normal;     RESPIRATORY: normal respiratory rate and pattern with no distress;     CARDIOVASCULAR: normal rate; rhythm is regular;  No murmurs. clicks, gallops or rubs appreciated; no edema;     BREAST/INTEGUMENT: No significant rashes, lesions or suspicious moles within limits of examination/encounter;     NEUROLOGIC: Grossly intact; mental status: alert and oriented x 3;     PSYCHIATRIC: appropriate affect and demeanor; normal speech pattern; Normal behavior;         Assessment:         I10   Essential (primary) hypertension       I20.9   Angina pectoris, unspecified           ORDERS:         Meds Prescribed:       [New Rx] hydroCHLOROthiazide 12.5 mg oral tablet [take 1 tablet (12.5 mg) by oral route daily], #30 (thirty) tablets, Refills: 0 (zero)                 Plan:         Essential (primary) hypertension- Not controlled.  Will start hydrochlorothiazide 12.5 mg daily.   She may very well need 25 mg daily to fully control her blood pressure.  Stress testing and echocardiogram normal.  Continue to follow with cardiology as directed    Telehealth: Verbal consent obtained for visit to occur via televideo conferencing           Prescriptions:       [New Rx] hydroCHLOROthiazide 12.5 mg oral tablet [take 1 tablet (12.5 mg) by oral route daily], #30 (thirty) tablets, Refills: 0 (zero)         Angina pectoris, unspecified- See above            Charge Capture:         Primary Diagnosis:     I10  Essential (primary) hypertension           Orders:      [id number]  Office/outpatient visit; established patient, level 4  (In-House)              I20.9  Angina pectoris, unspecified

## 2021-05-18 NOTE — PROGRESS NOTES
Marcela Greenberg 1970     Office/Outpatient Visit    Visit Date: Sat, Feb 16, 2019 11:41 am    Provider: Madelin Espinal MD (Assistant: Beatriz Hirsch MA)    Location: [facility]        Electronically signed by Madelin Espinal MD on  02/20/2019 09:24:26 AM                             SUBJECTIVE:        CC: sinusitis         HPI:         Acute frontal sinusitis noted.  This has been a problem for the past week.  This is an acute problem without chronic or recurrent episodes.  Her primary symptoms include productive, very little cough, right ear pain, congestion, and stuffiness,  frontal and maxillary facial pressure, fever to 99.9 degrees,  headache and nasal congestion.  She denies chest congestion or rhinorrhea.  She has already tried emergenC-OTC, saline nasal spray.  Medical history is pertinent for   shunt 2004 and Lumbar shunt (removed).      ROS:     CONSTITUTIONAL:  Positive for she has lost 60 #'s on keto.   Negative for chills or fever.      CARDIOVASCULAR:  Negative for chest pain, palpitations, tachycardia, orthopnea, and edema.      RESPIRATORY:  Negative for recent cough, dyspnea and frequent wheezing.      GASTROINTESTINAL:  Negative for abdominal pain, heartburn, constipation, diarrhea, and stool changes.      MUSCULOSKELETAL:  Positive for back pain ( stable ).      INTEGUMENTARY/BREAST:  Negative for rash.      NEUROLOGICAL:  Positive for headaches.   Negative for weakness.      PSYCHIATRIC:  Negative for anxiety, depression, and sleep disturbances.          PMH/FMH/SH:     Last Reviewed on 2/16/2019 11:58 AM by Madelin Espinal    Past Medical History: cva in her 30s         pseudotumor cerebri/loss vision right eye     bulemia    vit D def    HTN    GERD             PAST MEDICAL HISTORY             CURRENT MEDICAL PROVIDERS:    Gastroenterologist: steven    Neurologist: dr upton         PREVENTIVE HEALTH MAINTENANCE         unknown pneumonia shot  once between 2011 - 2013      BONE DENSITY: was last done 2019 with the following abnormality noted-- mild osteopenia lumbar spine     COLORECTAL CANCER SCREENING:; 10/9/17     EYE EXAM: was last done -      MAMMOGRAM: Done within last 2 years and results in are chart was last done 19 with normal results     PAP SMEAR: was last done Hysterectomy [facility] BSO  for heavy bleeding with normal results         PAST MEDICAL HISTORY             CURRENT MEDICAL PROVIDERS:    Gastroenterologist: SREEKANTH GURROLA    Neurologist: BART    Ophthalmologist:     Orthopedist: DORIAN         Surgical History:         Appendectomy    Cholecystectomy    Hysterectomy: ;     oophrectomy;      Laminectomy: cervical region; at 1,2;  Uterine Ablation    gastric stapling;     shunt ; Procedures: colonoscopy  EGD  lexiscan stress test 2014 normal         Family History: adopted (drug abuse)     Father: Medical history unknown     Mother:     ; Positive for colon and esophageal cancer;     Brother(s): 1 brother(s) total    ; Positive for enlarged heart;     Sister(s): 4 sister(s) total    ; Positive for Drug Abuse;         Social History:     Occupation:    Disabled (pseudo tumor/vision loss)     Marital Status:      Children: 3 children         Tobacco/Alcohol/Supplements:     Last Reviewed on 2019 11:58 AM by Madelin Espinal    Tobacco: She has never smoked.          Alcohol:  Does not drink alcohol and never has.          Substance Abuse History:     Last Reviewed on 3/02/2018 03:35 PM by Gina Perry        Mental Health History:     Last Reviewed on 3/02/2018 03:35 PM by Gina Perry        Communicable Diseases (eg STDs):     Last Reviewed on 3/02/2018 03:35 PM by Gina Perry            Allergies:     Last Reviewed on 2019 11:43 AM by Beatriz Hirsch    Percodan:    Percocet:    Morphine Sulfate:    Zoloft:        Current Medications:     Last Reviewed on 2019 11:43 AM  by Beatriz Hirsch    Prozac 20mg Capsules Take  1capsule(s) by mouth qam     Ibuprofen 800mg Tablet 1 bid PRN     Albuterol 90mcg/1actuation Oral Inhaler 2 puffs  QID  PRN     Baclofen 10mg Tablet     COQ 10 Q DAY     Glucosamine and Chondroitin ES 750mg/600mg Tablet 1 tab bid     Ranitidine 150mg Capsules 1 po qod     vITAMIN D3 20,000 Q DAY     Zyrtec 10mg Tablet 1 tab daily         OBJECTIVE:        Vitals:         Current: 2/16/2019 11:45:39 AM    Ht:  5 ft, 3.5 in;  Wt: 266.6 lbs;  BMI: 46.5    T: 97.8 F (oral);  BP: 125/74 mm Hg (left arm, sitting);  P: 93 bpm (left arm (BP Cuff), sitting);  sCr: 0.75 mg/dL;  GFR: 112.80        Exams:     PHYSICAL EXAM:     GENERAL: vital signs recorded - well developed, well nourished;  no apparent distress;     E/N/T: EARS:  normal external auditory canals and tympanic membranes;  grossly normal hearing; NOSE: nasal mucosa is edematous, erythematous, and partially obscured by purulent drainage;  turbinates are moderately swollen bilaterally;  right maxillary and right frontal sinus tenderness present; OROPHARYNX:  normal mucosa, dentition, gingiva, and posterior pharynx;     NECK: carotid exam reveals no bruits;     RESPIRATORY: normal respiratory rate and pattern with no distress; normal breath sounds with no rales, rhonchi, wheezes or rubs;     CARDIOVASCULAR: normal rate; rhythm is regular;  no systolic murmur; no edema;     GASTROINTESTINAL: nontender, nondistended; no hepatosplenomegaly or masses; no bruits;     PSYCHIATRIC:  appropriate affect and demeanor; normal speech pattern; grossly normal memory;         ASSESSMENT           461.1   J01.10  Acute frontal sinusitis              DDx:         ORDERS:         Meds Prescribed:       Azithromycin 250mg Tablet 2 po today, 1 po x 4 days  #6 (Six) tablet(s) Refills: 0                 PLAN:          Acute frontal sinusitis try OTC muccinex and flonase and push fluids, rest, start zpac         RECOMMENDATIONS given  include: Push Fluids, Rest, Follow up if no improvement or worsening symptoms like high fevers, vomiting, weakness, or increasing shortness of air.    .            Prescriptions:       Azithromycin 250mg Tablet 2 po today, 1 po x 4 days  #6 (Six) tablet(s) Refills: 0             CHARGE CAPTURE           **Please note: ICD descriptions below are intended for billing purposes only and may not represent clinical diagnoses**        Primary Diagnosis:         461.1 Acute frontal sinusitis            J01.10    Acute frontal sinusitis, unspecified              Orders:          56651   Office/outpatient visit; established patient, level 3  (In-House)

## 2021-05-18 NOTE — PROGRESS NOTES
Marcela Gutierrez  1970     Office/Outpatient Visit    Visit Date: Thu, Oct 1, 2020 04:46 pm    Provider: Carol Pinzon N.P. (Assistant: Quiana Barnes MA)    Location: [facility]        Electronically signed by Carol Pinzon N.P. on  10/02/2020 07:37:39 AM                             Subjective:        CC: Mrs. Gutierrez is a 50 year old White female.  fell last night over toys, hurt right shoulder; pt states she is not taking baclofen         HPI:       right shoulder pain     The symptom began yesterday.  Symptoms are relieved with Ice.  Associated symptoms include neck pain, upper back pain and right jaw pain.  Fell over toys yesteday at home and went fwd, hit her chin pushed her back           PHQ-9 Depression Screening: Completed form scanned and in chart; Total Score 2     ROS:     CONSTITUTIONAL:  Positive for intentional weight loss, 100 pounds.      CARDIOVASCULAR:  Negative for chest pain, palpitations, tachycardia, orthopnea, and edema.      RESPIRATORY:  Negative for cough, dyspnea, and hemoptysis.      NEUROLOGICAL:  Negative for dizziness, headaches, paresthesias, and weakness.          Past Medical History / Family History / Social History:         Last Reviewed on 10/01/2020 05:06 PM by Carol Pinzon    Past Medical History: cva in her 30s         pseudotumor cerebri/loss vision right eye     bulemia    vit D def    HTN    GERD             PAST MEDICAL HISTORY             CURRENT MEDICAL PROVIDERS:    Cardiologist    Gastroenterologist: steven    Neurologist: dr upton         PREVENTIVE HEALTH MAINTENANCE         unknown pneumonia shot  once between 2011 - 2013     BONE DENSITY: was last done jan 2019 with the following abnormality noted-- mild osteopenia lumbar spine     COLORECTAL CANCER SCREENING:; 10/9/17     EYE EXAM: was last done 2016- glasses     MAMMOGRAM: Done within last 2 years and results in are chart was last done 1-29-19 with normal results      PAP SMEAR: was last done Hysterectomy [facility] BSO  for heavy bleeding with normal results         PAST MEDICAL HISTORY             CURRENT MEDICAL PROVIDERS:    Gastroenterologist: SREEKANTH GURROLA    Neurologist: BART    Ophthalmologist:     Orthopedist: DORIAN         Surgical History:         Appendectomy    Cholecystectomy    Hysterectomy: ;     oophrectomy;     Laminectomy: cervical region; at 1,2;  Uterine Ablation    gastric stapling;     shunt ; Procedures: colonoscopy  EGD  and 10-9-17 lexiscan stress test 2014 normal         Family History: adopted (drug abuse)     Father: Medical history unknown     Mother:     ; Positive for colon and esophageal cancer;     Brother(s): 1 brother(s) total    ; Positive for enlarged heart;     Sister(s): 4 sister(s) total    ; Positive for Drug Abuse;         Social History:     Occupation:    Disabled (pseudo tumor/vision loss)     Marital Status:      Children: 3 children         Tobacco/Alcohol/Supplements:     Last Reviewed on 10/01/2020 04:52 PM by Quiana Barnes    Tobacco: She has never smoked.          Alcohol:  Does not drink alcohol and never has.          Substance Abuse History:     Last Reviewed on 7/15/2020 07:05 PM by Tony Cat        Mental Health History:     Last Reviewed on 7/15/2020 07:05 PM by Tony Cat        Communicable Diseases (eg STDs):     Last Reviewed on 7/15/2020 07:05 PM by Tony Cat        Immunizations:     Td adult 2018    Fluzone (3 + years dose) 2018    Fluzone pf (3+ years dose) 10/5/2019    Fluzone Quadrivalent (3+ years) 2018        Allergies:     Last Reviewed on 10/01/2020 04:52 PM by Quiana Barnes    Percodan:      Percocet:      Morphine Sulfate:      MASTAZOLE:      Zoloft:      Latex:      green dye:          Current Medications:     Last Reviewed on 10/01/2020 04:53 PM by Quiana Barnes    vitamin K     magnesium     metoprolol tartrate 50 mg oral tablet  [take 1 tablet (50 mg) by oral route 1 time per day with meals]    topiramate 100 mg oral tablet [take 1 tablet (100 mg) by oral route once daily]    Prozac 20 mg oral capsule [Take  1capsule(s) by mouth qam]    Albuterol 90mcg/1actuation Oral Inhaler [2 puffs  QID  PRN]    glucosamine-chondroitin 750-600 mg oral tablet [1 tab bid]    Singulair  10mg Tablet [Take 1 tablet(s) by mouth each evening]    hydroCHLOROthiazide 25 mg oral tablet [TAKE ONE TABLET BY MOUTH DAILY]    lisinopriL 10 mg oral tablet [TAKE ONE TABLET BY MOUTH DAILY]        Objective:        Vitals:         Current: 10/1/2020 4:56:20 PM    Ht:  5 ft, 3.5 in;  Wt: 254.2 lbs;  BMI: 44.3T: 95 F (temporal);  BP: 128/93 mm Hg (left arm, sitting);  P: 81 bpm (left arm (BP Cuff), sitting);  sCr: 0.74 mg/dL;  GFR: 109.61        Exams:     PHYSICAL EXAM:     GENERAL: vital signs recorded - well developed, well nourished;  no apparent distress;     NECK: carotid exam reveals no bruits;     RESPIRATORY: normal respiratory rate and pattern with no distress; normal breath sounds with no rales, rhonchi, wheezes or rubs;     CARDIOVASCULAR: normal rate; rhythm is regular;  no systolic murmur; no edema;     MUSCULOSKELETAL: pain with range of motion in: neck forward flexion;  full ROM Of right shoulder, pain to palpation of upper thoracic and lower cervical right para spinous muscles pain with movement of R TMJ and to palpation     PSYCHIATRIC: affect/demeanor: anxious;         Assessment:         M25.511   Pain in right shoulder       R68.84   Jaw pain       Z13.31   Encounter for screening for depression           ORDERS:         Radiology/Test Orders:       [id number]  Right Radiologic exam, shoulder; comp, 2 views  (Send-Out)            71189  Radiologic examination, spine, cervical; minimum of four views  (Send-Out)            [id number]  X-ray of jaw  (Send-Out)              Other Orders:         Depression screen negative  (In-House)                      Plan:          Pain in right shouldercontinue to use ice         RADIOLOGY:  I have ordered a C-Spine x-ray series and Shoulder x-ray: right shoulder x-ray to be done today.      RECOMMENDATIONS given include: rest, discussed fall prevention.      FOLLOW-UP:.  :for pending shoulder, neck and right jaw x-ray's           Orders:       [id number]  Right Radiologic exam, shoulder; comp, 2 views  (Send-Out)            54153  Radiologic examination, spine, cervical; minimum of four views  (Send-Out)              Jaw pain          Orders:       [id number]  X-ray of jaw  (Send-Out)              Encounter for screening for depression    MIPS PHQ-9 Depression Screening: Completed form scanned and in chart; Total Score 4; Negative Depression Screen           Orders:         Depression screen negative  (In-House)                  Patient Recommendations:        For  Pain in right shoulder:                    APPOINTMENT INFORMATION:        Monday Tuesday Wednesday Thursday Friday Saturday Sunday            Time:___________________AM  PM   Date:_____________________             Charge Capture:         Primary Diagnosis:     M25.511  Pain in right shoulder           Orders:      [id number]  Office/outpatient visit; established patient, level 4  (In-House)              R68.84  Jaw pain     Z13.31  Encounter for screening for depression           Orders:        Depression screen negative  (In-House)                  ADDENDUMS:      ____________________________________    Addendum: 10/06/2020 09:03 AM - Carol Pinzon        Add [id number]; Remove [id number]

## 2021-05-18 NOTE — PROGRESS NOTES
Marcela GreenbergAlly 1970     Office/Outpatient Visit    Visit Date: Tue, Nov 13, 2018 03:54 pm    Provider: Dinah Perez N.P. (Assistant: Jenna Austin MA)    Location: [facility]        Electronically signed by Dinah Perez N.P. on  11/15/2018 09:41:13 AM                             SUBJECTIVE:        CC: (PT ONLY TAKING 1 PROZAC Q DAY)     Mrs. Greenberg is a 48 year old White female.  Medicare Wellness; (PT WANTS FLU VACCINE TODAY , SHE HAS HAD A PNEUMONIA VACCINE IN PAST, NOT SURE WHICH ONE)         HPI:         Mrs. Greenberg is here for a Medicare wellness visit.  The required HRA questions are integrated within this visit note. Family medical history and individual medical/surgical history were reviewed and updated.  A current height, weight, BMI, blood pressure, and pulse were recorded in the vitals section of the note and have been reviewed. Patient's medications, including supplements, were recorded in the chart and reviewed.  Current providers and suppliers were reviewed and updated.          Self-Assessment of Health: She rates her health as fair. She rates her confidence of being able to control/manage most of her health problems as somewhat confident. Her physical/emotional health has limited her social activites moderately.  A review of cognitive impairment was performed, including ability to drive a car, manage finances, and any memory changes, and was found to be negative.  A review of functional ability and level of safety was performed and the following was noted: Urine and Bowel continence: ( Abnormal ) Falls Risk: Has not had any falls or only one fall without injury in the past year.  She denies having trouble hearing the TV/radio when others do not, having to strain to hear or understand conversations and wearing hearing aid(s).  Concerning home safety, she reports that at home she DOES have adequate lighting, a skid resistant shower/tub, handrails on stairs and functioning smoke  alarms, but not grab bars in the bath or absence of throw rugs.  Physical Activity: She exercises for at least 20 minutes 3 or more days/week.; Type of diet patient normally eats is described as KETOS/ low carb/high protein/ high fat Tobacco: She has never smoked.  Preventative Health updated today         PHQ-9 Depression Screening: Completed form scanned and in chart; Total Score 3 Alcohol Consumption Screening: Completed form scanned and in chart; Total Score 1     ROS:     CONSTITUTIONAL:  Negative for chills, fatigue, fever, and weight change.      EYES:  Positive for use of glasses or contacts.      E/N/T:  Negative for hearing problems, E/N/T pain, congestion, rhinorrhea, epistaxis, hoarseness, and dental problems.      CARDIOVASCULAR:  Negative for chest pain, palpitations, tachycardia, orthopnea, and edema.      RESPIRATORY:  Negative for cough, dyspnea, and hemoptysis.      GASTROINTESTINAL:  Positive for acid reflux symptoms ( stable ).   Negative for constipation, diarrhea, nausea or vomiting.      GENITOURINARY:  Negative for genital lesions, hematuria, menstrual problems, polyuria, abnormal vaginal bleeding, and vaginal discharge.      MUSCULOSKELETAL:  Positive for back pain ( stable ).      NEUROLOGICAL:  Positive for dizziness ( intermittent, stable,  pseudotumor past due follow up with neurology ).      PSYCHIATRIC:  Positive for anxiety and yahir depression ( (stable) ).   Negative for feelings of stress, sleep disturbance or suicidal thoughts.          PMH/FMH/SH:     Last Reviewed on 3/02/2018 03:35 PM by Gina Perry    Past Medical History:         pseudotumor cerebri/loss vision right eye     bulemia    vit D def    HTN    GERD             PAST MEDICAL HISTORY             CURRENT MEDICAL PROVIDERS:    Gastroenterologist: (name unknown)    Neurologist: dr upton    neurosurgeon dr. [place], Dr. Prem spencer         PREVENTIVE HEALTH MAINTENANCE             BONE DENSITY: has  never been done     COLORECTAL CANCER SCREENING:; 10/9/17     MAMMOGRAM: Done within last 2 years and results in are chart was last done 2017 with normal results     PAP SMEAR: was last done Hysterectomy BELINDA BSO  for heavy bleeding with normal results         PAST MEDICAL HISTORY             CURRENT MEDICAL PROVIDERS:    Gastroenterologist: SREEKANTH GURROLA    Neurologist: BART    Ophthalmologist:     Orthopedist: DORIAN         Surgical History:         Appendectomy    Cholecystectomy    Hysterectomy: ;     oophrectomy;      Laminectomy: cervical region; at 1,2;  Uterine Ablation    gastric stapling;     shunt ; Procedures: colonoscopy  EGD  lexiscan stress test 2014 normal         Family History: adopted (drug abuse)     Father: Medical history unknown     Mother:     ; Positive for colon and esophageal cancer;     Brother(s): 1 brother(s) total    ; Positive for enlarged heart;     Sister(s): 4 sister(s) total    ; Positive for Drug Abuse;         Social History:     Occupation:    Disabled (pseudo tumor/vision loss)     Marital Status:      Children: 3 children         Tobacco/Alcohol/Supplements:     Last Reviewed on 2018 04:13 PM by Jenna Austin    Tobacco: She has never smoked.          Alcohol:  Does not drink alcohol and never has.          Substance Abuse History:     Last Reviewed on 3/02/2018 03:35 PM by Gina Perry        Mental Health History:     Last Reviewed on 3/02/2018 03:35 PM by Gina Perry        Communicable Diseases (eg STDs):     Last Reviewed on 3/02/2018 03:35 PM by Gina Perry            Current Problems:     Last Reviewed on 3/02/2018 03:35 PM by Gina Perry    Lumbar radiculopathy     Joint pain, NEC     Lower back pain     Chronic low back pain     Patient with cerebral ventricle (communicating) shunt, valve, or device in situ     Depression responsive to treatment     HTN     Vitamin D deficiency      Esophageal reflux         Immunizations:     Fluzone (3 + years dose) 1/25/2018         Allergies:     Last Reviewed on 3/02/2018 03:35 PM by Gina Perry    Percodan:    Percocet:    Morphine Sulfate:    Zoloft:    MASTAZOLE:    Latex:    green dye:        Current Medications:     Last Reviewed on 11/13/2018 04:10 PM by Jenna Austin    Ibuprofen 800mg Tablet 1 bid PRN     Albuterol 90mcg/1actuation Oral Inhaler 2 puffs  QID  PRN     Baclofen 10mg Tablet chronic back spasms     Zyrtec 10mg Tablet 1 tab daily     COQ 10 Q DAY     Glucosamine and Chondroitin ES 750mg/600mg Tablet 1 tab bid     Ranitidine 150mg Capsules 1 po qod     vITAMIN D3 20,000 Q DAY     Prozac 20mg Capsules Take  1capsule(s) by mouth qam         OBJECTIVE:        Vitals:         Historical:     03/02/2018  BP:   146/88 mm Hg ( (left arm, , sitting, );)     03/02/2018  Wt:   312.9lbs        Current: 11/13/2018 4:14:48 PM    Ht:  5 ft, 3.5 in;  Wt: 266.6 lbs;  BMI: 46.5    T: 98.2 F (oral);  BP: 160/68 mm Hg (left arm, sitting);  P: 60 bpm (left arm (BP Cuff), sitting);  sCr: 0.76 mg/dL;  GFR: 111.32    VA: 20/70 OD, 20/40 OS (near, with correction)        Exams:     PHYSICAL EXAM:     GENERAL:  well developed and nourished; appropriately groomed; in no apparent distress;     EYES: PERRL, EOMI     E/N/T: EARS: bilateral TMs are normal;  NOSE: normal nasal mucosa; OROPHARYNX: posterior pharynx, including tonsils, tongue, and uvula are normal;     NECK: thyroid is non-palpable;     RESPIRATORY: normal respiratory rate and pattern with no distress; normal breath sounds with no rales, rhonchi, wheezes or rubs;     CARDIOVASCULAR: normal rate; rhythm is regular;     Peripheral Pulses: posterior tibial: 2+ amplitude, no bruits; no edema;     GASTROINTESTINAL: nontender; normal bowel sounds;     LYMPHATIC: no enlargement of cervical or facial nodes;     BREAST/INTEGUMENT: left 2nd toe with abrasion and superficial ijury to nailbed distally.  4  mm scab tip of toe without drainage.  slight erythema. scaly 3-4 mm pink corner of left upper lip and left nares externally     MUSCULOSKELETAL:  Normal range of motion, strength and tone;     NEUROLOGIC: mental status: alert and oriented x 3; GROSSLY INTACT     PSYCHIATRIC:  appropriate affect and demeanor; normal speech pattern; grossly normal memory;         Procedures:     Influenza vaccination     1. Influenza, seasonal (children 3 years to adult): 0.5 ml unit dose, ABN signed given IM in the left upper arm; administered by mnp;  lot number [id number]; expires 6/30/19 Regarding contraindications to an Influenza vaccine: Denies moderate/severe illness with/without fever; serious reaction to eggs, egg proteins, gentamicin, gelatin, arginine, neomycin or polymixin; serious reaction after recieving previous influenza vaccines; and history of Guillain-[place] Syndrome.          Open wound of foot     1. Td: 0.5 ml unit dose given IM in the right upper arm; administered by mnp;  lot number [id number]; expires 12/19/20             ASSESSMENT           V70.0   Z00.01  Health checkup              DDx:     V79.0   Z13.89  Screening for depression              DDx:     729.5   M79.674  Toe pain              DDx:     V04.81   Z23  Influenza vaccination              DDx:     782.1   R21  Rash              DDx:     892.0   S91.309A  Open wound of foot              DDx:     272.4   E78.1  Hyperlipidemia              DDx:     780.79   R53.83  Fatigue              DDx:     V76.10   Z12.39  Screening for breast cancer, unspecified              DDx:     V82.81   Z13.820  Screening for osteoporosis              DDx:         ORDERS:         Meds Prescribed:       Keflex (Cephalexin) 500mg Capsules 1 cap bid for 7 days  #14 (Fourteen) capsule(s) Refills: 0       Metronidazole 0.75% Lotion Apply thin film to affected area am and pm  #4 (Four) oz Refills: 0         Radiology/Test Orders:       [id number]  Screening mammography  [facility]-[employer] CAD   (Send-Out)         48198  DXA, bone density study, 1 or more sites; axial skeleton (eg hips, pelvis, spine)  (Send-Out)           Lab Orders:       [id number]  COMP - [facility] Comp. Metabolic Panel  (Send-Out)         00244  LPDP - [facility] Lipid Panel  (Send-Out)         34474  BDCB2 - [facility] CBC w/o diff  (Send-Out)         65217  TSH - [facility] TSH  (Send-Out)           Procedures Ordered:       [id number]  Immunization administration; one vaccine  (In-House)         15863  Immunization administration; one vaccine  (In-House)           Other Orders:       [id number]  Influenza virus vaccine, quadrivalent, split virus, preservative free 3 years of age & older  (In-House)         07374  Td vaccine prsrv free 7 yrs or older for IM use  (In-House)                   PLAN:          Health checkup         COUNSELING was provided today regarding the following topics: healthy eating habits, low salt diet, regular exercise, breast self-exam, STD prevention, abnormal post-menopausal vaginal bleeding, and states she rec'd pneumonia shot 5-7 yrs ago.           Toe pain         RECOMMENDATIONS given include: soak in warm epsom salt water bid.    follow up if not improving..            Prescriptions:       Keflex (Cephalexin) 500mg Capsules 1 cap bid for 7 days  #14 (Fourteen) capsule(s) Refills: 0          Influenza vaccination           Orders:       49847  Influenza virus vaccine, quadrivalent, split virus, preservative free 3 years of age & older  (In-House)         47040  Immunization administration; one vaccine  (In-House)            Rash           Prescriptions:       Metronidazole 0.75% Lotion Apply thin film to affected area am and pm  #4 (Four) oz Refills: 0          Open wound of foot           Orders:       43058  Td vaccine prsrv free 7 yrs or older for IM use  (In-House)         79936  Immunization administration; one vaccine  (In-House)            Hyperlipidemia     LABORATORY:  Labs ordered to be performed today include Comprehensive metabolic  panel and lipid panel.      RECOMMENDATIONS given include: exercise, low cholesterol/low fat diet, weight loss, and successful wt loss on modified keto diet.            Orders:       11009  COMP - [facility] Comp. Metabolic Panel  (Send-Out)         92414  LPDP - [facility] Lipid Panel  (Send-Out)            Fatigue     LABORATORY:  Labs ordered to be performed today include CBC W/O DIFF and TSH.            Orders:       66130  BDCB2 - [facility] CBC w/o diff  (Send-Out)         96579  TSH - [facility] TSH  (Send-Out)            Screening for breast cancer, unspecified           Orders:       63171  Screening mammography [facility] CAD  (Send-Out)            Screening for osteoporosis     family hs negative for osteoprosis.  estrogen deficiency due to complete hysterectomy 2007.  fx right foot 1 yr ago.  has taken frequent steroids over the yrs.            Orders:       25560  DXA, bone density study, 1 or more sites; axial skeleton (eg hips, pelvis, spine)  (Send-Out)               Patient Recommendations:        For  Health checkup:         Limit dietary intake of fat (especially saturated fat) and cholesterol.  Eat a variety of foods, including plenty of fruits, vegetables, and grain containg fiber, limit fat intake to 30% of total calories. Balance caloric intake with energy expended.    Maintaining regular physical activity is advised to help prevent heart disease, hypertension, diabetes, and obesity.    You should regularly examine your breasts, easily done while in the shower or with lotion.  Feel and look for differences in consistency from month to month, especially noting knots or lumps, changes in skin appearance, nipple retraction or discharge.    Sexually transmitted diseases may be prevented by abstaining from sexual activity or avoiding sexual contact with high risk partners, and consistently using a condom or female barrier contraceptives plus spermacide.    Unless you are taking estrogen replacement in a cyclical fashion,  where you are expected to have a period every month, post-menopausal bleeding is not normal, and may signify a serious problem such as endometrial cancer. Call your doctor if this occurs!          For  Hyperlipidemia:     Maintain a regular exercise program. Reduce the amount of cholesterol and saturated fat in your diet. Try to lose some weight; even modest weight reduction can improve your blood pressure.              CHARGE CAPTURE           **Please note: ICD descriptions below are intended for billing purposes only and may not represent clinical diagnoses**        Primary Diagnosis:         V70.0 Health checkup            Z00.01    Encounter for general adult medical examination with abnormal findings              Orders:          21521   Preventive medicine, established patient, age 40-64 years  (In-House)           V79.0 Screening for depression            Z13.89    Encounter for screening for other disorder              Orders:          95687 -25  Office/outpatient visit; established patient, level 2  (In-House)           729.5 Toe pain            M79.674    Pain in right toe(s)    V04.81 Influenza vaccination            Z23    Encounter for immunization              Orders:          06079   Influenza virus vaccine, quadrivalent, split virus, preservative free 3 years of age & older  (In-House)             31792   Immunization administration; one vaccine  (In-House)           782.1 Rash            R21    Rash and other nonspecific skin eruption    892.0 Open wound of foot            S91.309A    Unspecified open wound, unspecified foot, initial encounter              Orders:          58597   Td vaccine prsrv free 7 yrs or older for IM use  (In-House)             92065   Immunization administration; one vaccine  (In-House)           272.4 Hyperlipidemia            E78.1    Pure hyperglyceridemia    780.79 Fatigue            R53.83    Other fatigue    V76.10 Screening for breast cancer, unspecified             Z12.39    Encounter for other screening for malignant neoplasm of breast    V82.81 Screening for osteoporosis            Z13.820    Encounter for screening for osteoporosis

## 2021-05-18 NOTE — PROGRESS NOTES
Marcela GreenbergAlly 1970     Office/Outpatient Visit    Visit Date: Thu, Jun 6, 2019 11:10 am    Provider: Dinah Perez N.P. (Assistant: Sarah Spurling, MA)    Location: [facility]        Electronically signed by Dinah Perez N.P. on  06/09/2019 02:13:44 PM                             SUBJECTIVE:        CC:     Mrs. Greenberg is a 48 year old White female.  Stress fracture, she has a big knot on the top of her right foot.;         HPI:         Patient to be evaluated for foot pain.  Today's visit is for evaluation of the right foot.  The location of the discomfort is primarily the dorsal surface.  The pain does not radiate.  The pain initially began 1 to 2 weeks ago.  No precipitating event or injury is identified.  Associated symptoms include swelling.  She denies or weakness.  Other details: has been moving the last 1-2 wks and on feet more than usual..          does not have a neurologist or neurosurgeon that she follows.  does get intermittent headaches.  none current.          Additionally, she presents with history of hTN.  she is not currently taking an antihypertensive.  was previously prescribed lisinopril but did not take it.  she is not good about taking medications.  also has concern about lisinopril  causing angioedema.      ROS:     CONSTITUTIONAL:  Negative for chills, fatigue, fever, and weight change.      E/N/T:  Negative for hearing problems, E/N/T pain, congestion, rhinorrhea, epistaxis, hoarseness, and dental problems.      CARDIOVASCULAR:  Negative for chest pain, palpitations, tachycardia, orthopnea, and edema.      RESPIRATORY:  Negative for cough, dyspnea, and hemoptysis.      GASTROINTESTINAL:  Negative for abdominal pain, heartburn, constipation, diarrhea, and stool changes.      MUSCULOSKELETAL:  Positive for right foot pain.      NEUROLOGICAL:  Positive for headaches.   Negative for dizziness, paresthesias or weakness.      ENDOCRINE:  Negative for hair loss, heat/cold  intolerance, polydipsia, and polyphagia.      PSYCHIATRIC:  Negative for anxiety, depression, and sleep disturbances.          PMH/FMH/SH:     Last Reviewed on 2019 11:58 AM by Madelin Espinal    Past Medical History: cva in her 30s         pseudotumor cerebri/loss vision right eye     bulemia    vit D def    HTN    GERD             PAST MEDICAL HISTORY             CURRENT MEDICAL PROVIDERS:    Gastroenterologist: steven    Neurologist: dr upton         PREVENTIVE HEALTH MAINTENANCE         unknown pneumonia shot  once between  -      BONE DENSITY: was last done 2019 with the following abnormality noted-- mild osteopenia lumbar spine     COLORECTAL CANCER SCREENING:; 10/9/17     EYE EXAM: was last done -      MAMMOGRAM: Done within last 2 years and results in are chart was last done 19 with normal results     PAP SMEAR: was last done Hysterectomy BELINDA BSO  for heavy bleeding with normal results         PAST MEDICAL HISTORY             CURRENT MEDICAL PROVIDERS:    Gastroenterologist: SREEKANTH GURROLA    Neurologist: BART    Ophthalmologist:     Orthopedist: DORIAN         Surgical History:         Appendectomy    Cholecystectomy    Hysterectomy: ;     oophrectomy;      Laminectomy: cervical region; at 1,2;  Uterine Ablation    gastric stapling;     shunt ; Procedures: colonoscopy  EGD  lexiscan stress test 2014 normal         Family History: adopted (drug abuse)     Father: Medical history unknown     Mother:     ; Positive for colon and esophageal cancer;     Brother(s): 1 brother(s) total    ; Positive for enlarged heart;     Sister(s): 4 sister(s) total    ; Positive for Drug Abuse;         Social History:     Occupation:    Disabled (pseudo tumor/vision loss)     Marital Status:      Children: 3 children         Tobacco/Alcohol/Supplements:     Last Reviewed on 2019 11:58 AM by Madelin Espinal    Tobacco: She has never  smoked.          Alcohol:  Does not drink alcohol and never has.          Substance Abuse History:     Last Reviewed on 3/02/2018 03:35 PM by Gina Perry        Mental Health History:     Last Reviewed on 3/02/2018 03:35 PM by Gina Perry        Communicable Diseases (eg STDs):     Last Reviewed on 3/02/2018 03:35 PM by Gina Perry            Current Problems:     Last Reviewed on 3/02/2018 03:35 PM by Gina Perry    Fatigue     Hyperlipidemia     Lumbar radiculopathy     Joint pain, NEC     Lower back pain     Chronic low back pain     Patient with cerebral ventricle (communicating) shunt, valve, or device in situ     Depression responsive to treatment     HTN     Vitamin D deficiency     Esophageal reflux         Immunizations:     Td adult 11/13/2018     Fluzone (3 + years dose) 1/25/2018     Fluzone Quadrivalent (3+ years) 11/13/2018         Allergies:     Last Reviewed on 2/16/2019 11:43 AM by Beatriz Hirsch    Percodan:    Percocet:    Morphine Sulfate:    Zoloft:    MASTAZOLE:    Latex:    green dye:        Current Medications:     Last Reviewed on 2/16/2019 11:43 AM by Beatriz Hirsch    Prozac 20mg Capsules Take  1capsule(s) by mouth qam     Ibuprofen 800mg Tablet 1 bid PRN     Albuterol 90mcg/1actuation Oral Inhaler 2 puffs  QID  PRN     Baclofen 10mg Tablet     COQ 10 Q DAY     Glucosamine and Chondroitin ES 750mg/600mg Tablet 1 tab bid     Ranitidine 150mg Capsules 1 po qod     vITAMIN D3 20,000 Q DAY     Zyrtec 10mg Tablet 1 tab daily         OBJECTIVE:        Vitals:         Historical:     02/16/2019  BP:   125/74 mm Hg ( (left arm, , sitting, );)     02/16/2019  Wt:   266.6lbs        Current: 6/6/2019 11:22:01 AM    Ht:  5 ft, 3.5 in;  Wt: 265.6 lbs;  BMI: 46.3    T: 98.2 F (oral);  BP: 153/95 mm Hg (left arm, sitting);  P: 63 bpm (left arm (BP Cuff), sitting);  sCr: 0.75 mg/dL;  GFR: 112.62        Repeat:     11:23:32 AM     BP:   145/98mm Hg (right arm, sitting, second  take.)         Exams:     PHYSICAL EXAM:     GENERAL:  well developed and nourished; appropriately groomed; in no apparent distress;     RESPIRATORY: normal respiratory rate and pattern with no distress; normal breath sounds with no rales, rhonchi, wheezes or rubs;     CARDIOVASCULAR: normal rate; rhythm is regular;     Peripheral Pulses: dorsalis pedis: 2+ amplitude, no bruits; posterior tibial: 2+ amplitude, no bruits; no edema;     MUSCULOSKELETAL:  Normal range of motion, strength and tone;     NEUROLOGIC: mental status: alert and oriented x 3; GROSSLY INTACT     PSYCHIATRIC:  appropriate affect and demeanor; normal speech pattern; grossly normal memory;         ASSESSMENT           729.5   M79.671  Foot pain              DDx:     V45.2   Z98.2  Patient with cerebral ventricle (communicating) shunt, valve, or device in situ              DDx:     401.1   I10  HTN              DDx:         ORDERS:         Meds Prescribed:       Losartan 25mg Tablet Take 1 tablet(s) by mouth daily  #30 (Thirty) tablet(s) Refills: 2       Meloxicam 15mg Tablet 1 po QD with food  #30 (Thirty) tablet(s) Refills: 0         Radiology/Test Orders:       [id number]  Right radiologic examination, foot; complete, minimum of three views  (Send-Out)           Procedures Ordered:       REFER  Referral to Specialist or Other Facility  (Send-Out)                   PLAN:          Foot pain         RADIOLOGY:  I have ordered a right foot x-ray to be done today.      RECOMMENDATIONS given include: apply cold.  hold meloxicam if bp is not controlled..            Prescriptions:       Meloxicam 15mg Tablet 1 po QD with food  #30 (Thirty) tablet(s) Refills: 0           Orders:       [id number]  Right radiologic examination, foot; complete, minimum of three views  (Send-Out)            Patient with cerebral ventricle (communicating) shunt, valve, or device in situ         REFERRALS:  Referral initiated to a neurologist.            Orders:       REFER   Referral to Specialist or Other Facility  (Send-Out)            HTN         RECOMMENDATIONS given include: avoid pseudoephedrine or other stimulants/decongestants in common cold remedies, perform routine monitoring of blood pressure with home blood pressure cuff, reduction of dietary salt intake, take medication as prescribed, try not to miss doses, stress reduction, Reduce caffiene, and Advised about free BP checks on the last Saturday of each month.      FOLLOW-UP: Schedule a follow-up visit in 1 month.            Prescriptions:       Losartan 25mg Tablet Take 1 tablet(s) by mouth daily  #30 (Thirty) tablet(s) Refills: 2           Patient Education Handouts:       High Blood Pressure (HTN)              Patient Recommendations:        For  HTN:     Certain decongestants and stimulants (like pseudoephedrine) in common cold remedies raise blood pressure, sometimes to dangerously high levels. Never take with MAO inhibitors! Begin monitoring your blood pressure by brief nurse visits at our office, a home blood pressure monitor, or by checking on the machines in pharmacies or stores.  Keep a log of the readings. Reduce the amount of salt in your diet. Try and reduce the effects of stress on blood pressure by relaxation, meditation, biofeedback, exercise or other stress reduction methods.  Schedule a follow-up visit in 1 month.              CHARGE CAPTURE           **Please note: ICD descriptions below are intended for billing purposes only and may not represent clinical diagnoses**        Primary Diagnosis:         729.5 Foot pain            M79.671    Pain in right foot              Orders:          [id number]   Office/outpatient visit; established patient, level 4  (In-House)           V45.2 Patient with cerebral ventricle (communicating) shunt, valve, or device in situ            Z98.2    Presence of cerebrospinal fluid drainage device    401.1 HTN            I10    Essential (primary) hypertension

## 2021-05-19 ENCOUNTER — CONVERSION ENCOUNTER (OUTPATIENT)
Dept: SURGERY | Facility: CLINIC | Age: 51
End: 2021-05-19

## 2021-05-19 ENCOUNTER — OFFICE VISIT CONVERTED (OUTPATIENT)
Dept: UROLOGY | Facility: CLINIC | Age: 51
End: 2021-05-19
Attending: UROLOGY

## 2021-05-19 LAB
BILIRUB UR QL STRIP: NEGATIVE
COLOR UR: YELLOW
CONV BACTERIA IN URINE MICRO: 0
CONV CALCIUM OXALATE CRYSTALS /HPF IN URINE SEDIMENT BY MICROSCOPY: 0
CONV CLARITY OF URINE: CLEAR
CONV PROTEIN IN URINE BY AUTOMATED TEST STRIP: NEGATIVE
CONV UROBILINOGEN IN URINE BY AUTOMATED TEST STRIP: 0.2
GLUCOSE UR QL: NEGATIVE
HGB UR QL STRIP: NEGATIVE
KETONES UR QL STRIP: NEGATIVE
LEUKOCYTE ESTERASE UR QL STRIP: NORMAL
NITRITE UR QL STRIP: NEGATIVE
PH UR STRIP.AUTO: 6.5 [PH]
RBC #/AREA URNS HPF: 0 /[HPF]
RENAL EPI CELLS #/AREA URNS HPF: 0 /[HPF]
SP GR UR: 1.02
SQUAMOUS SPT QL MICRO: 0
WBC #/AREA URNS HPF: 0 /[HPF]

## 2021-06-05 NOTE — H&P
History and Physical      Patient Name: Marcela Gutierrez   Patient ID: [id number]   Sex: Female   YOB: 1970    Primary Care Provider: Madelin Espinal MD   Referring Provider: Altagracia Benitez MD    Visit Date: May 19, 2021    Provider: Gilda Dunham MD   Location: [facility]   Location Address: [address] [place] [address]  [id number]   Location Phone: [phone]          Chief Complaint  · Patient here for urological concerns      History Of Present Illness  The patient is a 50 year old /White female, who presents on referral from Altagracia Benitez MD, for an urological evaluation for urinary incontinence which the patient associates with no known event.   Urge Incontinence:   The patient states she has had multiple episodes with urgency in the past years. She describes leakage of small amounts, moderate amounts, and large amounts of urine. The patients symptoms are getting worse. The patient uses 3 pads a day. She also reports frequency and nocturia. She denies needing to strain to void, an intermittent stream, and incomplete emptying. She has had episodes of retention in the past and needed a catheter. That has not helped for years.   The patient notes aggravating factors are none. There no identified alleviating factors. Her past medical history is notable for a history of Sleep apnea and Stroke.   She denies a history of dementia, multiple sclerosis, and recurrent urinary tract infections. The patient has not been previously evaluated for this condition.   Previous Treatment:   She has not received treatment in the past.       Past Medical History  Contusion of right shoulder, subsequent encounter; Esophageal Reflux; Rectal Bleeding; Sleep apnea; Subacromial impingement, right         Past Surgical History  Appendectomy; Cesarian Section; Cholecstectomy; Colonoscopy; Cyst Removal; EGD; Foot surgery; Gastric banding; Hernia-Inguinal; Hysterectomy; Laminectomy;  "Oopherectomy; Shunt; Tubal ligation         Medication List  hydrochlorothiazide 25 mg oral tablet; K2 Plus D3 1,000-100 unit-mcg oral tablet; lisinopril 10 mg oral tablet; magnesium 250 mg oral tablet; metoprolol succinate 50 mg oral tablet extended release 24 hr; Prozac 20 mg oral capsule; topiramate 50 mg oral tablet; vitamin B complex oral capsule; Vitamin D3 125 mcg (5,000 unit) oral tablet         Allergy List  Mastisol Liquid Adhesive; Morphine Sulfate; Percocet; Percodan; Zoloft         Family Medical History  Adopted - no noted history         Social History  Alcohol (Former); Tobacco (Never)         Review of Systems  · Constitutional  o Denies  o : fatigue, fever, chills, night sweats      Vitals  Date Time BP Position Site L\R Cuff Size HR RR TEMP (F) WT  HT  BMI kg/m2 BSA m2 O2 Sat FR L/min FiO2        05/19/2021 03:42 /83 Sitting       255lbs 2oz 5'  3\" 45.19 2.27             Physical Examination  · Constitutional  o Appearance  o : Well nourished, well developed patient in no acute distress. Ambulating without difficulty.  · Neck  o Thyroid  o : Normal size without tenderness, nodules or masses  · Respiratory  o Respiratory Effort  o : breathing unlabored  · Skin and Subcutaneous Tissue  o General Inspection  o : No rashes, lesions or areas of discoloration present. Skin turgor is normal.  o General Palpation  o : No abnormalities, masses or tenderness on palpation.          Results  · In-Office Procedures  o Lab procedure  § Automated dipstick urinalysis with microscopy ([id number])   § Color Ur: Yellow   § Clarity Ur: Clear   § Glucose Ur Ql Strip: Negative   § Bilirub Ur Ql Strip: Negative   § Ketones Ur Ql Strip: Negative   § Sp Gr Ur Qn: 1.025   § Hgb Ur Ql Strip: Negative   § pH Ur-LsCnc: 6.5   § Prot Ur Ql Strip: Negative   § Urobilinogen Ur Strip-mCnc: 0.2   § Nitrite Ur Ql Strip: Negative   § WBC Est Ur Ql Strip: Trace   § RBC UrnS Qn HPF: 0   § WBC UrnS Qn HPF: 0   § Bacteria UrnS Qn HPF: " 0   § Crystals UrnS Qn HPF: 0   § Epithelial Cells (non renal): 0 /HPF  § Epithelial Cells (renal): 0   o Surgical procedure  § IOP - Bladder Scan/Residual Urine ([id number])   § Specimen vol Ur: 13       Assessment  · Urge Incontinence     788.31/N39.41    Problems Reconciled  Plan  · Medications  o Myrbetriq 25 mg oral tablet extended release 24 hr   SIG: take 1 tablet (25 mg) by oral route once daily swallowing whole with water. Do not crush, chew and/or divide. for 28 days   DISP: (28) Tablet with 0 refills  Prescribed on 05/19/2021     o Medications have been Reconciled  o Transition of Care or Provider Policy  · Instructions  o PLAN:  o Diagnosis of urge incontinence was discussed with patient. She was counseled on treatments and behavorial modifications to prevent and reduce urgency. She will proceed with Myrbetriq and retutn to clinic for follow up. She cannot take anticholinergics due to delayed gastric emptying. She also has had retention in the past. We also discussed bladder botox but I will try Myrbetriq first.   o FOLLOW-UP:  o In 1 month            Electronically Signed by: Gilda Dunham MD -Author on May 19, 2021 04:49:31 PM

## 2021-06-24 RX ORDER — LOSARTAN POTASSIUM 25 MG/1
TABLET ORAL
COMMUNITY
Start: 2021-05-07 | End: 2021-07-06

## 2021-06-24 RX ORDER — TOPIRAMATE 50 MG/1
TABLET, FILM COATED ORAL
COMMUNITY
End: 2021-06-28 | Stop reason: SDUPTHER

## 2021-06-24 RX ORDER — FLUOXETINE HYDROCHLORIDE 20 MG/1
CAPSULE ORAL
COMMUNITY
End: 2021-11-16

## 2021-06-24 RX ORDER — HYDROCHLOROTHIAZIDE 25 MG/1
TABLET ORAL
COMMUNITY
End: 2021-09-24

## 2021-06-24 RX ORDER — ALBUTEROL SULFATE 90 UG/1
2 AEROSOL, METERED RESPIRATORY (INHALATION) AS NEEDED
COMMUNITY
Start: 2021-04-08 | End: 2022-04-13 | Stop reason: SDUPTHER

## 2021-06-24 RX ORDER — IBUPROFEN 400 MG/1
TABLET ORAL
COMMUNITY
End: 2022-02-17

## 2021-06-24 RX ORDER — FLUTICASONE PROPIONATE 50 MCG
1 SPRAY, SUSPENSION (ML) NASAL AS NEEDED
COMMUNITY
Start: 2021-04-08 | End: 2021-08-06 | Stop reason: SDUPTHER

## 2021-06-24 RX ORDER — METOPROLOL SUCCINATE 50 MG/1
TABLET, EXTENDED RELEASE ORAL
COMMUNITY
End: 2021-08-24

## 2021-06-24 RX ORDER — LISINOPRIL 10 MG/1
TABLET ORAL
COMMUNITY
End: 2021-06-28 | Stop reason: SDUPTHER

## 2021-06-24 RX ORDER — AMOXICILLIN AND CLAVULANATE POTASSIUM 875; 125 MG/1; MG/1
TABLET, FILM COATED ORAL
COMMUNITY
Start: 2021-04-08 | End: 2021-06-28 | Stop reason: SDUPTHER

## 2021-06-24 RX ORDER — TOPIRAMATE 50 MG/1
TABLET, FILM COATED ORAL
COMMUNITY
Start: 2021-04-14 | End: 2021-06-28 | Stop reason: SDUPTHER

## 2021-06-24 RX ORDER — CEPHRADINE 500 MG
CAPSULE ORAL
COMMUNITY
End: 2022-09-01

## 2021-06-24 RX ORDER — MULTIVITAMIN WITH IRON
TABLET ORAL
COMMUNITY
End: 2023-01-26

## 2021-06-24 RX ORDER — CALCIUM CARBONATE 750 MG/1
300 TABLET, CHEWABLE ORAL AS NEEDED
COMMUNITY
End: 2023-01-26

## 2021-06-28 ENCOUNTER — OFFICE VISIT (OUTPATIENT)
Dept: UROLOGY | Facility: CLINIC | Age: 51
End: 2021-06-28

## 2021-06-28 VITALS
HEIGHT: 63 IN | DIASTOLIC BLOOD PRESSURE: 72 MMHG | WEIGHT: 248.2 LBS | SYSTOLIC BLOOD PRESSURE: 132 MMHG | BODY MASS INDEX: 43.98 KG/M2

## 2021-06-28 DIAGNOSIS — R32 URINARY INCONTINENCE IN FEMALE: Primary | ICD-10-CM

## 2021-06-28 DIAGNOSIS — N32.81 OAB (OVERACTIVE BLADDER): ICD-10-CM

## 2021-06-28 LAB
BILIRUB BLD-MCNC: NEGATIVE MG/DL
CLARITY, POC: CLEAR
COLOR UR: YELLOW
GLUCOSE UR STRIP-MCNC: NEGATIVE MG/DL
KETONES UR QL: NEGATIVE
LEUKOCYTE EST, POC: NEGATIVE
NITRITE UR-MCNC: NEGATIVE MG/ML
PH UR: 7 [PH] (ref 5–8)
PROT UR STRIP-MCNC: NEGATIVE MG/DL
RBC # UR STRIP: NEGATIVE /UL
SP GR UR: 1.02 (ref 1–1.03)
UROBILINOGEN UR QL: NORMAL

## 2021-06-28 PROCEDURE — 99212 OFFICE O/P EST SF 10 MIN: CPT | Performed by: UROLOGY

## 2021-06-28 PROCEDURE — 81003 URINALYSIS AUTO W/O SCOPE: CPT | Performed by: UROLOGY

## 2021-06-28 RX ORDER — METOPROLOL TARTRATE 50 MG/1
TABLET, FILM COATED ORAL
COMMUNITY
Start: 2021-05-05 | End: 2021-08-24 | Stop reason: SDUPTHER

## 2021-06-28 RX ORDER — TOPIRAMATE 100 MG/1
100 TABLET, FILM COATED ORAL DAILY
COMMUNITY
Start: 2021-05-05 | End: 2021-08-24 | Stop reason: SDUPTHER

## 2021-06-28 RX ORDER — METHYLPREDNISOLONE 4 MG/1
TABLET ORAL
COMMUNITY
Start: 2021-04-08 | End: 2021-07-14

## 2021-06-28 RX ORDER — BACLOFEN 10 MG/1
10 TABLET ORAL AS NEEDED
COMMUNITY
End: 2022-06-30

## 2021-06-28 NOTE — PROGRESS NOTES
"Chief Complaint  Follow-up (1 Month)    Subjective          Marcela Gutierrez presents to [facility]  The patient is a 50 year old /White female, who presents on referral from Altagracia Benitez MD, for an urological evaluation for urinary incontinence which the patient associates with no known event.     Urge Incontinence:   The patient states she has had multiple episodes with urgency in the past years. She describes leakage of small amounts, moderate amounts, and large amounts of urine. The patients symptoms are getting worse. The patient uses 3 pads a day. She also reports frequency and nocturia. She denies needing to strain to void, an intermittent stream, and incomplete emptying. She has had episodes of retention in the past and needed a catheter. That has not helped for years.     The patient notes aggravating factors are none. There no identified alleviating factors. Her past medical history is notable for a history of Sleep apnea and Stroke.     She denies a history of dementia, multiple sclerosis, and recurrent urinary tract infections. The patient has not been previously evaluated for this condition.     Previous Treatment:   She recently tried Myrbetriq 25 mg p.o. daily.  She is unable to take anticholinergics due to delayed gastric emptying.    Objective   Vital Signs:   /72   Ht 160 cm (63\")   Wt 113 kg (248 lb 3.2 oz)   BMI 43.97 kg/m²     Physical Exam  Vitals and nursing note reviewed.   Constitutional:       Appearance: Normal appearance. She is well-developed.   Pulmonary:      Effort: Pulmonary effort is normal.      Breath sounds: Normal air entry.   Neurological:      Mental Status: She is alert and oriented to person, place, and time.      Motor: Motor function is intact.   Psychiatric:         Mood and Affect: Mood normal.         Behavior: Behavior normal.        Result Review :                  Results for orders placed or performed in visit on 06/28/21 "   POC Urinalysis Dipstick, Automated    Specimen: Urine   Result Value Ref Range    Color Yellow Yellow, Straw, Dark Yellow, Citlaly    Clarity, UA Clear Clear    Specific Gravity  1.020 1.005 - 1.030    pH, Urine 7.0 5.0 - 8.0    Leukocytes Negative Negative    Nitrite, UA Negative Negative    Protein, POC Negative Negative mg/dL    Glucose, UA Negative Negative, 1000 mg/dL (3+) mg/dL    Ketones, UA Negative Negative    Urobilinogen, UA Normal Normal    Bilirubin Negative Negative    Blood, UA Negative Negative       Assessment and Plan    Diagnoses and all orders for this visit:    1. Urinary incontinence in female (Primary)  -     POC Urinalysis Dipstick, Automated    2. OAB (overactive bladder)  Assessment & Plan:  Continue Myrbetriq 25mg once a day.  I sent in a script.  I will see her in one year.         Follow Up   No follow-ups on file.  Patient was given instructions and counseling regarding her condition or for health maintenance advice. Please see specific information pulled into the AVS if appropriate.

## 2021-07-01 VITALS
WEIGHT: 265.6 LBS | SYSTOLIC BLOOD PRESSURE: 145 MMHG | DIASTOLIC BLOOD PRESSURE: 98 MMHG | HEIGHT: 64 IN | BODY MASS INDEX: 45.34 KG/M2 | TEMPERATURE: 98.2 F | HEART RATE: 63 BPM

## 2021-07-01 VITALS
SYSTOLIC BLOOD PRESSURE: 141 MMHG | BODY MASS INDEX: 45.41 KG/M2 | DIASTOLIC BLOOD PRESSURE: 82 MMHG | TEMPERATURE: 98.4 F | HEART RATE: 86 BPM | WEIGHT: 266 LBS | HEIGHT: 64 IN

## 2021-07-01 VITALS
TEMPERATURE: 97.8 F | HEART RATE: 93 BPM | DIASTOLIC BLOOD PRESSURE: 74 MMHG | WEIGHT: 266.6 LBS | BODY MASS INDEX: 45.52 KG/M2 | HEIGHT: 64 IN | SYSTOLIC BLOOD PRESSURE: 125 MMHG

## 2021-07-01 VITALS
DIASTOLIC BLOOD PRESSURE: 79 MMHG | WEIGHT: 270.2 LBS | BODY MASS INDEX: 46.13 KG/M2 | SYSTOLIC BLOOD PRESSURE: 130 MMHG | HEART RATE: 67 BPM | HEIGHT: 64 IN | TEMPERATURE: 98.8 F

## 2021-07-01 VITALS
BODY MASS INDEX: 46.4 KG/M2 | HEIGHT: 64 IN | WEIGHT: 271.8 LBS | HEART RATE: 71 BPM | SYSTOLIC BLOOD PRESSURE: 155 MMHG | TEMPERATURE: 98.4 F | DIASTOLIC BLOOD PRESSURE: 80 MMHG

## 2021-07-01 VITALS
HEIGHT: 64 IN | DIASTOLIC BLOOD PRESSURE: 68 MMHG | HEART RATE: 60 BPM | WEIGHT: 266.6 LBS | SYSTOLIC BLOOD PRESSURE: 160 MMHG | BODY MASS INDEX: 45.52 KG/M2 | TEMPERATURE: 98.2 F

## 2021-07-01 VITALS
HEART RATE: 81 BPM | SYSTOLIC BLOOD PRESSURE: 126 MMHG | DIASTOLIC BLOOD PRESSURE: 75 MMHG | TEMPERATURE: 97.3 F | BODY MASS INDEX: 44.9 KG/M2 | HEIGHT: 64 IN | WEIGHT: 263 LBS

## 2021-07-01 VITALS
TEMPERATURE: 97.3 F | DIASTOLIC BLOOD PRESSURE: 88 MMHG | WEIGHT: 293 LBS | HEIGHT: 64 IN | HEART RATE: 98 BPM | BODY MASS INDEX: 50.02 KG/M2 | SYSTOLIC BLOOD PRESSURE: 146 MMHG

## 2021-07-02 VITALS
DIASTOLIC BLOOD PRESSURE: 86 MMHG | BODY MASS INDEX: 44.69 KG/M2 | TEMPERATURE: 97.1 F | HEIGHT: 64 IN | HEART RATE: 67 BPM | WEIGHT: 261.8 LBS | SYSTOLIC BLOOD PRESSURE: 140 MMHG

## 2021-07-02 VITALS
BODY MASS INDEX: 41.79 KG/M2 | OXYGEN SATURATION: 97 % | HEIGHT: 64 IN | WEIGHT: 244.8 LBS | DIASTOLIC BLOOD PRESSURE: 72 MMHG | HEART RATE: 89 BPM | SYSTOLIC BLOOD PRESSURE: 105 MMHG | TEMPERATURE: 99.2 F

## 2021-07-02 VITALS
HEIGHT: 64 IN | BODY MASS INDEX: 43.4 KG/M2 | HEART RATE: 81 BPM | DIASTOLIC BLOOD PRESSURE: 93 MMHG | TEMPERATURE: 95 F | WEIGHT: 254.2 LBS | SYSTOLIC BLOOD PRESSURE: 128 MMHG

## 2021-07-02 VITALS
DIASTOLIC BLOOD PRESSURE: 82 MMHG | WEIGHT: 268.4 LBS | TEMPERATURE: 98.4 F | HEIGHT: 64 IN | BODY MASS INDEX: 45.82 KG/M2 | SYSTOLIC BLOOD PRESSURE: 138 MMHG | HEART RATE: 72 BPM

## 2021-07-06 RX ORDER — LOSARTAN POTASSIUM 25 MG/1
TABLET ORAL
Qty: 90 TABLET | Refills: 0 | Status: SHIPPED | OUTPATIENT
Start: 2021-07-06 | End: 2021-09-15

## 2021-07-06 NOTE — TELEPHONE ENCOUNTER
LAST OV: 4/17/21  NEXT OV: 7/14/21   LAST LAB: 10/15/19 unable to access EM[facility]    PLEASE SIGN OR ADVISE    Lab Results   Component Value Date    BUN 9 10/15/2019    CREATININE 0.74 10/15/2019    BCR 12 10/15/2019    K 4.3 10/15/2019    CO2 24 10/15/2019    CALCIUM 10.1 10/15/2019    ALBUMIN 4.4 10/15/2019    LABIL2 1.2 (L) 10/15/2019    AST 27 10/15/2019    ALT 27 10/15/2019     WBC   Date Value Ref Range Status   10/15/2019 7.05 4.80 - 10.80 10*3/uL Final     RBC   Date Value Ref Range Status   10/15/2019 4.63 4.20 - 5.40 10*6/uL Final     Hemoglobin   Date Value Ref Range Status   10/15/2019 12.8 12.0 - 16.0 g/dL Final     Hematocrit   Date Value Ref Range Status   10/15/2019 38.9 37.0 - 47.0 % Final     MCV   Date Value Ref Range Status   10/15/2019 84.0 81.0 - 99.0 fL Final     MCH   Date Value Ref Range Status   10/15/2019 27.6 27.0 - 31.0 pg Final     MCHC   Date Value Ref Range Status   10/15/2019 32.9 (L) 33.0 - 37.0 Final     RDW   Date Value Ref Range Status   10/15/2019 13.0 11.7 - 14.4 % Final     MPV   Date Value Ref Range Status   10/15/2019 9.2 (L) 9.4 - 12.3 fL Final     Platelets   Date Value Ref Range Status   10/15/2019 358 130 - 400 10*3/uL Final

## 2021-07-14 ENCOUNTER — OFFICE VISIT (OUTPATIENT)
Dept: FAMILY MEDICINE CLINIC | Age: 51
End: 2021-07-14

## 2021-07-14 ENCOUNTER — LAB (OUTPATIENT)
Dept: LAB | Facility: HOSPITAL | Age: 51
End: 2021-07-14

## 2021-07-14 VITALS
BODY MASS INDEX: 43.9 KG/M2 | TEMPERATURE: 98.5 F | DIASTOLIC BLOOD PRESSURE: 59 MMHG | WEIGHT: 247.8 LBS | SYSTOLIC BLOOD PRESSURE: 115 MMHG | HEART RATE: 72 BPM

## 2021-07-14 DIAGNOSIS — D64.9 ANEMIA, UNSPECIFIED TYPE: ICD-10-CM

## 2021-07-14 DIAGNOSIS — G25.81 RLS (RESTLESS LEGS SYNDROME): ICD-10-CM

## 2021-07-14 DIAGNOSIS — E66.01 CLASS 3 SEVERE OBESITY DUE TO EXCESS CALORIES WITHOUT SERIOUS COMORBIDITY WITH BODY MASS INDEX (BMI) OF 40.0 TO 44.9 IN ADULT (HCC): ICD-10-CM

## 2021-07-14 DIAGNOSIS — I10 ESSENTIAL HYPERTENSION: Primary | ICD-10-CM

## 2021-07-14 PROBLEM — J45.909 ASTHMA: Status: ACTIVE | Noted: 2021-07-14

## 2021-07-14 PROBLEM — R51.9 HEADACHE DISORDER: Status: ACTIVE | Noted: 2020-06-24

## 2021-07-14 LAB
BASOPHILS # BLD AUTO: 0.13 10*3/MM3 (ref 0–0.2)
BASOPHILS NFR BLD AUTO: 1.4 % (ref 0–1.5)
DEPRECATED RDW RBC AUTO: 40.2 FL (ref 37–54)
EOSINOPHIL # BLD AUTO: 0.78 10*3/MM3 (ref 0–0.4)
EOSINOPHIL NFR BLD AUTO: 8.6 % (ref 0.3–6.2)
ERYTHROCYTE [DISTWIDTH] IN BLOOD BY AUTOMATED COUNT: 12.6 % (ref 12.3–15.4)
HCT VFR BLD AUTO: 42.3 % (ref 34–46.6)
HGB BLD-MCNC: 14.7 G/DL (ref 12–15.9)
IMM GRANULOCYTES # BLD AUTO: 0.03 10*3/MM3 (ref 0–0.05)
IMM GRANULOCYTES NFR BLD AUTO: 0.3 % (ref 0–0.5)
IRON 24H UR-MRATE: 44 MCG/DL (ref 37–145)
IRON SATN MFR SERPL: 11 % (ref 20–50)
LYMPHOCYTES # BLD AUTO: 4.03 10*3/MM3 (ref 0.7–3.1)
LYMPHOCYTES NFR BLD AUTO: 44.6 % (ref 19.6–45.3)
MCH RBC QN AUTO: 30.2 PG (ref 26.6–33)
MCHC RBC AUTO-ENTMCNC: 34.8 G/DL (ref 31.5–35.7)
MCV RBC AUTO: 86.9 FL (ref 79–97)
MONOCYTES # BLD AUTO: 0.55 10*3/MM3 (ref 0.1–0.9)
MONOCYTES NFR BLD AUTO: 6.1 % (ref 5–12)
NEUTROPHILS NFR BLD AUTO: 3.51 10*3/MM3 (ref 1.7–7)
NEUTROPHILS NFR BLD AUTO: 39 % (ref 42.7–76)
NRBC BLD AUTO-RTO: 0 /100 WBC (ref 0–0.2)
PLATELET # BLD AUTO: 366 10*3/MM3 (ref 140–450)
PMV BLD AUTO: 9.9 FL (ref 6–12)
RBC # BLD AUTO: 4.87 10*6/MM3 (ref 3.77–5.28)
TIBC SERPL-MCNC: 414 MCG/DL (ref 298–536)
TRANSFERRIN SERPL-MCNC: 278 MG/DL (ref 200–360)
WBC # BLD AUTO: 9.03 10*3/MM3 (ref 3.4–10.8)

## 2021-07-14 PROCEDURE — 99214 OFFICE O/P EST MOD 30 MIN: CPT | Performed by: FAMILY MEDICINE

## 2021-07-14 PROCEDURE — 36415 COLL VENOUS BLD VENIPUNCTURE: CPT

## 2021-07-14 PROCEDURE — 84466 ASSAY OF TRANSFERRIN: CPT

## 2021-07-14 PROCEDURE — 83540 ASSAY OF IRON: CPT

## 2021-07-14 PROCEDURE — 85025 COMPLETE CBC W/AUTO DIFF WBC: CPT

## 2021-07-14 PROCEDURE — 82728 ASSAY OF FERRITIN: CPT

## 2021-07-14 RX ORDER — LISINOPRIL 10 MG/1
10 TABLET ORAL DAILY
COMMUNITY
End: 2021-07-14

## 2021-07-14 NOTE — PROGRESS NOTES
"[facility]  Office Visit Note    Marcela Gutierrez presents to [facility]  Encounter Date: 07/14/2021     Chief Complaint  Hypertension (also discuss hav dora horses in legs.)    Subjective    History of Present Illness:  Marcela presents clinic for general follow-up.  1.  Hypertension: Marcela reports that her blood pressure has been well controlled.  She is very happy with this.  Her current regimen includes metoprolol 50 mg daily, losartan 25 mg daily and HCTZ 25 mg daily.  She reports compliance without adverse effects.  2.  Leg cramping/RLS: Marcela reports that she has been experiencing significant cramping of her bilateral lower extremities and bilateral feet.  This tends to occur most frequently at night.  She has been taking magnesium and potassium over-the-counter without improvement of her symptoms.  Of note, she does have a history of iron deficiency anemia.  She also thinks that she has been on a medicine for restless legs in the past but cannot recall which one.  3.  Marcela reports that she has been been dieting and exercising in an attempt to lose weight.  She initially was very successful but seems to have hit a plateau.  She is working out up to 5 days/week and logging her food intake with an effort to at the right amount of calories.  She had her stomach \"stapled\" many years ago but is not sure if she would want to pursue any additional bariatric surgery.  She does wonders what her options are.    Review of Systems:  Review of Systems   Constitutional: Negative for chills, fatigue and fever.   Eyes: Positive for visual disturbance.   Respiratory: Negative for cough and shortness of breath.    Cardiovascular: Negative for chest pain, palpitations and leg swelling.   Gastrointestinal: Negative for abdominal pain, constipation, diarrhea, nausea and vomiting.   Musculoskeletal:        Positive for leg cramps   Neurological: Positive for headache. Negative " for dizziness, weakness and numbness.   Psychiatric/Behavioral: Negative for sleep disturbance, suicidal ideas and depressed mood. The patient is not nervous/anxious.         Objective   Vital Signs:  /59 (BP Location: Right arm, Patient Position: Sitting, Cuff Size: Large Adult)   Pulse 72   Temp 98.5 °F (36.9 °C) (Oral)   Wt 112 kg (247 lb 12.8 oz)   BMI 43.90 kg/m²      Physical Examination:  Physical Exam  Vitals reviewed.   Constitutional:       General: She is not in acute distress.     Appearance: Normal appearance.   HENT:      Head: Normocephalic and atraumatic.   Eyes:      Conjunctiva/sclera: Conjunctivae normal.   Cardiovascular:      Rate and Rhythm: Normal rate and regular rhythm.      Heart sounds: No murmur heard.     Pulmonary:      Effort: Pulmonary effort is normal. No respiratory distress.      Breath sounds: Normal breath sounds.   Musculoskeletal:      Right lower leg: No edema.      Left lower leg: No edema.   Skin:     General: Skin is warm and dry.      Findings: No rash.   Neurological:      General: No focal deficit present.      Mental Status: She is alert and oriented to person, place, and time.   Psychiatric:         Mood and Affect: Mood normal.         Behavior: Behavior normal.             Procedures:    No procedures associated with this visit.     Assessment and Plan:  Diagnoses and all orders for this visit:    1. Essential hypertension (Primary)  -Stable.  Continue HCTZ 25 mg daily, losartan 25 mg daily and metoprolol 50 mg daily.    2. RLS (restless legs syndrome)  3. Anemia, unspecified type   -     Leg cramps versus RLS.  Claudication is a possibility but does not seem to be likely at this point.  We will order a CBC and an iron panel to see if iron deficiency is present.  If so, we will treat accordingly with ferrous sulfate.  If not, we will trial Requip nightly.  - CBC w AUTO Differential; Future  -     Iron and TIBC; Future  -     Ferritin; Future    4. Class 3  "severe obesity due to excess calories without serious comorbidity with body mass index (BMI) of 40.0 to 44.9 in adult (CMS/[facility])  -Discussed potential weight loss strategies.  We went over potential medications.  Marcela is not interested in any of these at this time.  Additionally, she is not interested in referral to a dietitian but may be in the future.  She will let us know.  Finally, she is undecided if she would like to have a referral to bariatric surgery.  Overall, she is of the inclination to \"do it on her own.\"  She has done a great job amending her diet and increasing her physical activity.  We discussed the possibility that she may have just had a temporary plateau and with continued persistence she may see improvements in the future.  She will let us know if she would desires any of the above treatments.      Return if symptoms worsen or fail to improve.    Patient was given instructions and counseling regarding her condition or for health maintenance advice. Please see specific information pulled into the AVS if appropriate.      Tony Cat MD   7/14/2021         "

## 2021-07-15 VITALS
BODY MASS INDEX: 45.2 KG/M2 | SYSTOLIC BLOOD PRESSURE: 160 MMHG | DIASTOLIC BLOOD PRESSURE: 83 MMHG | HEIGHT: 63 IN | WEIGHT: 255.12 LBS

## 2021-07-15 LAB — FERRITIN SERPL-MCNC: 27.3 NG/ML (ref 13–150)

## 2021-07-15 RX ORDER — FERROUS SULFATE 324(65)MG
324 TABLET, DELAYED RELEASE (ENTERIC COATED) ORAL
Qty: 30 TABLET | Refills: 1 | Status: SHIPPED | OUTPATIENT
Start: 2021-07-15 | End: 2022-05-13

## 2021-07-15 RX ORDER — FERROUS SULFATE 324(65)MG
324 TABLET, DELAYED RELEASE (ENTERIC COATED) ORAL
Qty: 30 TABLET | Refills: 1 | Status: SHIPPED | OUTPATIENT
Start: 2021-07-15 | End: 2021-07-15 | Stop reason: SDUPTHER

## 2021-08-06 RX ORDER — FLUTICASONE PROPIONATE 50 MCG
1 SPRAY, SUSPENSION (ML) NASAL AS NEEDED
Qty: 16 G | Refills: 2 | Status: SHIPPED | OUTPATIENT
Start: 2021-08-06 | End: 2023-01-26

## 2021-08-16 ENCOUNTER — TELEPHONE (OUTPATIENT)
Dept: FAMILY MEDICINE CLINIC | Age: 51
End: 2021-08-16

## 2021-08-16 NOTE — TELEPHONE ENCOUNTER
Pt wanting to go forward with trying Phentermine if you are still able to send it in for her, please adv.

## 2021-08-16 NOTE — TELEPHONE ENCOUNTER
HUB TO READ    While calling pt to r/s upcoming appt, pt was asking about weight loss meds. I r/s pt to come in and see provider on 8/31/21 to come in talk to provider in regards to meds. She would like to speak with a nurse in regards to med refills for weight loss. [phone]

## 2021-08-23 ENCOUNTER — TELEPHONE (OUTPATIENT)
Dept: FAMILY MEDICINE CLINIC | Age: 51
End: 2021-08-23

## 2021-08-24 RX ORDER — TOPIRAMATE 100 MG/1
100 TABLET, FILM COATED ORAL DAILY
Qty: 90 TABLET | Refills: 0 | Status: SHIPPED | OUTPATIENT
Start: 2021-08-24 | End: 2021-11-19 | Stop reason: SDUPTHER

## 2021-08-24 RX ORDER — METOPROLOL TARTRATE 50 MG/1
50 TABLET, FILM COATED ORAL DAILY
Qty: 90 TABLET | Refills: 1 | Status: SHIPPED | OUTPATIENT
Start: 2021-08-24 | End: 2022-03-02 | Stop reason: SDUPTHER

## 2021-08-30 ENCOUNTER — TELEPHONE (OUTPATIENT)
Dept: FAMILY MEDICINE CLINIC | Age: 51
End: 2021-08-30

## 2021-08-30 RX ORDER — TOPIRAMATE 100 MG/1
100 TABLET, FILM COATED ORAL DAILY
Qty: 90 TABLET | Refills: 0 | Status: CANCELLED | OUTPATIENT
Start: 2021-08-30

## 2021-09-01 NOTE — TELEPHONE ENCOUNTER
Spoke with Floridalma at [facility], states medication was sent out on 8/26 and was delivered on 8/30.     Spoke with patient and she states she was needing the Topamax more than Metoprolol. She states medication was received yesterday.

## 2021-09-15 RX ORDER — LOSARTAN POTASSIUM 25 MG/1
TABLET ORAL
Qty: 90 TABLET | Refills: 0 | Status: SHIPPED | OUTPATIENT
Start: 2021-09-15 | End: 2021-12-17

## 2021-09-24 NOTE — TELEPHONE ENCOUNTER
Rx Refill Note  Requested Prescriptions     Pending Prescriptions Disp Refills   • hydroCHLOROthiazide (HYDRODIURIL) 25 MG tablet [Pharmacy Med Name: HYDROCHLOROTHIAZIDE 25 MG Tablet] 90 tablet      Sig: TAKE 1 TABLET EVERY DAY      Last office visit with prescribing clinician: 7/14/2021      Next office visit with prescribing clinician: Visit date not found/ would like to EST care back with BRUCE, message sent for review.       {TIP  Please add Last Relevant Lab Date if appropriate:  Lab Results   Component Value Date    BUN 9 10/15/2019    CREATININE 0.74 10/15/2019    BCR 12 10/15/2019    K 4.3 10/15/2019    CO2 24 10/15/2019    CALCIUM 10.1 10/15/2019    ALBUMIN 4.4 10/15/2019    LABIL2 1.2 (L) 10/15/2019    AST 27 10/15/2019    ALT 27 10/15/2019     WBC   Date Value Ref Range Status   07/14/2021 9.03 3.40 - 10.80 10*3/mm3 Final     RBC   Date Value Ref Range Status   07/14/2021 4.87 3.77 - 5.28 10*6/mm3 Final     Hemoglobin   Date Value Ref Range Status   07/14/2021 14.7 12.0 - 15.9 g/dL Final     Hematocrit   Date Value Ref Range Status   07/14/2021 42.3 34.0 - 46.6 % Final     MCV   Date Value Ref Range Status   07/14/2021 86.9 79.0 - 97.0 fL Final     MCH   Date Value Ref Range Status   07/14/2021 30.2 26.6 - 33.0 pg Final     MCHC   Date Value Ref Range Status   07/14/2021 34.8 31.5 - 35.7 g/dL Final     RDW   Date Value Ref Range Status   07/14/2021 12.6 12.3 - 15.4 % Final     RDW-SD   Date Value Ref Range Status   07/14/2021 40.2 37.0 - 54.0 fl Final     MPV   Date Value Ref Range Status   07/14/2021 9.9 6.0 - 12.0 fL Final     Platelets   Date Value Ref Range Status   07/14/2021 366 140 - 450 10*3/mm3 Final     Neutrophil %   Date Value Ref Range Status   07/14/2021 39.0 (L) 42.7 - 76.0 % Final     Lymphocyte %   Date Value Ref Range Status   07/14/2021 44.6 19.6 - 45.3 % Final     Monocyte %   Date Value Ref Range Status   07/14/2021 6.1 5.0 - 12.0 % Final     Eosinophil %   Date Value Ref Range  Status   07/14/2021 8.6 (H) 0.3 - 6.2 % Final     Basophil %   Date Value Ref Range Status   07/14/2021 1.4 0.0 - 1.5 % Final     Immature Grans %   Date Value Ref Range Status   07/14/2021 0.3 0.0 - 0.5 % Final     Neutrophils, Absolute   Date Value Ref Range Status   07/14/2021 3.51 1.70 - 7.00 10*3/mm3 Final     Lymphocytes, Absolute   Date Value Ref Range Status   07/14/2021 4.03 (H) 0.70 - 3.10 10*3/mm3 Final     Monocytes, Absolute   Date Value Ref Range Status   07/14/2021 0.55 0.10 - 0.90 10*3/mm3 Final     Eosinophils, Absolute   Date Value Ref Range Status   07/14/2021 0.78 (H) 0.00 - 0.40 10*3/mm3 Final     Basophils, Absolute   Date Value Ref Range Status   07/14/2021 0.13 0.00 - 0.20 10*3/mm3 Final     Immature Grans, Absolute   Date Value Ref Range Status   07/14/2021 0.03 0.00 - 0.05 10*3/mm3 Final     nRBC   Date Value Ref Range Status   07/14/2021 0.0 0.0 - 0.2 /100 WBC Final        {TIP  Is Refill Pharmacy correct?YES  Nuria Berger  09/24/21, 11:48 EDT

## 2021-09-25 RX ORDER — HYDROCHLOROTHIAZIDE 25 MG/1
25 TABLET ORAL DAILY
Qty: 90 TABLET | Refills: 0 | Status: SHIPPED | OUTPATIENT
Start: 2021-09-25 | End: 2021-12-20 | Stop reason: SDUPTHER

## 2021-09-28 ENCOUNTER — TELEPHONE (OUTPATIENT)
Dept: FAMILY MEDICINE CLINIC | Age: 51
End: 2021-09-28

## 2021-09-28 NOTE — TELEPHONE ENCOUNTER
"Caller: Verenice Gutierrez    Relationship to patient: Self    Best call back number: [phone]    Additional notes:PATEINT STATED THAT SHE IS RESPONDING TO MESSAGE THAT WAS LEFT FOR HER IN PREVIOUS MESSAGE.     WHEN I READ THIS MESSAGE FROM GIGI  - \"[facility], pt past due for medicare wellness. Dr. Cat is no longer with us. HUB please schedule pt medicare wellness exam with Carol Abad as soon as possible.\"   THE PATIENT RESPONDED STATING THAT SHE WILL ONLY GO BACK TO SEEING DR BARRERA OR WILL BE LEAVING THE PRACTICE. REQUESTING CALL TO RESOLVE AND BE SCHEDULED WITH DR BARRERA ASAP. PATIENT REQUESTED TO REMIND DR BARRERA THAT THIS PATIENT WAS VERENICE MANZANO WHEN SHE WAS HER PATIENT.   "
3

## 2021-09-28 NOTE — TELEPHONE ENCOUNTER
[facility], pt past due for medicare wellness. Dr. Cat is no longer with us..     HUB please schedule pt medicare wellness exam with Carol Abad as soon as possible- clr

## 2021-10-27 ENCOUNTER — OFFICE VISIT (OUTPATIENT)
Dept: FAMILY MEDICINE CLINIC | Age: 51
End: 2021-10-27

## 2021-10-27 ENCOUNTER — HOSPITAL ENCOUNTER (OUTPATIENT)
Dept: CT IMAGING | Facility: HOSPITAL | Age: 51
Discharge: HOME | End: 2021-10-27
Admitting: NURSE PRACTITIONER

## 2021-10-27 VITALS
WEIGHT: 239 LBS | DIASTOLIC BLOOD PRESSURE: 76 MMHG | TEMPERATURE: 98.4 F | OXYGEN SATURATION: 99 % | BODY MASS INDEX: 42.35 KG/M2 | HEART RATE: 81 BPM | HEIGHT: 63 IN | SYSTOLIC BLOOD PRESSURE: 147 MMHG

## 2021-10-27 DIAGNOSIS — Z98.2 VP (VENTRICULOPERITONEAL) SHUNT STATUS: Primary | ICD-10-CM

## 2021-10-27 DIAGNOSIS — H92.01 RIGHT EAR PAIN: ICD-10-CM
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
## 2021-10-27 DIAGNOSIS — Z98.2 VP (VENTRICULOPERITONEAL) SHUNT STATUS: ICD-10-CM

## 2021-10-27 PROBLEM — G47.30 SLEEP APNEA: Status: ACTIVE | Noted: 2021-07-14

## 2021-10-27 PROBLEM — D64.9 ANEMIA: Status: ACTIVE | Noted: 2021-10-27

## 2021-10-27 PROCEDURE — 99213 OFFICE O/P EST LOW 20 MIN: CPT | Performed by: NURSE PRACTITIONER

## 2021-10-27 PROCEDURE — 70450 CT HEAD/BRAIN W/O DYE: CPT

## 2021-10-27 NOTE — PROGRESS NOTES
"Chief Complaint  Other (check on shunt- fell 2 weeks ago and hit right side of head near ear )    Subjective  Marcela is a 51-year-old female with history of  shunt that is located behind her right ear and has been present for about 15 years or more.  The shunt down the spine was broke broken off the result of a previous fall.  Has had no concerns until recently when she fell getting something out of her car and hit the edge of a door on the area directly behind her ear along the right side of her head.  She states she developed a hematoma that was present for about 3 days and it resolved with little concern.  May have had an increase in some mild headaches at that time.  Later noticed a right earache and thought she had an ear infection so 2 days ago went to [facility] for an exam.  She was told that her ears look perfect and was advised to follow-up regarding her shunt.  She came here to get further evaluation.  She does also state her jaw pops sometimes bilateral with chewing gum.  She is afebrile and denies anything other than mild intermittent headache.        Marcela Gutierrez presents to [facility]    Review of Systems   Constitutional: Negative.    HENT: Positive for ear pain.    Respiratory: Negative.    Cardiovascular: Negative.    Musculoskeletal: Negative.    Neurological: Negative for dizziness, tremors, seizures, syncope, speech difficulty, weakness, light-headedness and numbness.        Objective   Vital Signs:   Vitals:    10/27/21 1602   BP: 147/76   BP Location: Left arm   Patient Position: Sitting   Cuff Size: Large Adult   Pulse: 81   Temp: 98.4 °F (36.9 °C)   TempSrc: Oral   SpO2: 99%   Weight: 108 kg (239 lb)   Height: 160 cm (63\")      Physical Exam  Vitals reviewed.   Constitutional:       General: She is not in acute distress.     Appearance: Normal appearance. She is well-developed.   HENT:      Right Ear: Tympanic membrane normal.      Left Ear: Tympanic " membrane normal.   Eyes:      Extraocular Movements: Extraocular movements intact.      Pupils: Pupils are equal, round, and reactive to light.   Cardiovascular:      Rate and Rhythm: Normal rate and regular rhythm.      Heart sounds: Normal heart sounds.   Pulmonary:      Effort: Pulmonary effort is normal.      Breath sounds: Normal breath sounds.   Musculoskeletal:      Right lower leg: No edema.      Left lower leg: No edema.   Skin:     General: Skin is warm and dry.   Neurological:      General: No focal deficit present.      Mental Status: She is alert.   Psychiatric:         Attention and Perception: Attention normal.         Mood and Affect: Mood and affect normal.         Behavior: Behavior normal.          Result Review :                Assessment and Plan    Diagnoses and all orders for this visit:    1.  (ventriculoperitoneal) shunt status (Primary)  -     CT Head Without Contrast; Future    2. Right ear pain  Assessment & Plan:  Consider TMJ as a cause.  Ear exam is normal.  Due to history of shunt will attain a CT of the head.  Further treatment pending results.  If CT is negative she is to follow conservative measures for temporomandibular joint syndrome.  She may also see a dentist.    Orders:  -     CT Head Without Contrast; Future    3. Fall, initial encounter  Assessment & Plan:  Neuro exam normal.  CT of the head without contrast is ordered and pending.  To emergency room if worsens in the meantime.    Orders:  -     CT Head Without Contrast; Future      Follow Up    No follow-ups on file.  Patient was given instructions and counseling regarding her condition or for health maintenance advice. Please see specific information pulled into the AVS if appropriate.

## 2021-10-29 NOTE — ASSESSMENT & PLAN NOTE
Consider TMJ as a cause.  Ear exam is normal.  Due to history of shunt will attain a CT of the head.  Further treatment pending results.  If CT is negative she is to follow conservative measures for temporomandibular joint syndrome.  She may also see a dentist.

## 2021-10-29 NOTE — ASSESSMENT & PLAN NOTE
Neuro exam normal.  CT of the head without contrast is ordered and pending.  To emergency room if worsens in the meantime.

## 2021-11-16 ENCOUNTER — OFFICE VISIT (OUTPATIENT)
Dept: FAMILY MEDICINE CLINIC | Age: 51
End: 2021-11-16

## 2021-11-16 VITALS
HEIGHT: 63 IN | DIASTOLIC BLOOD PRESSURE: 87 MMHG | SYSTOLIC BLOOD PRESSURE: 139 MMHG | WEIGHT: 238.4 LBS | HEART RATE: 71 BPM | BODY MASS INDEX: 42.24 KG/M2 | TEMPERATURE: 98.5 F

## 2021-11-16 DIAGNOSIS — Z00.00 ENCOUNTER FOR ANNUAL WELLNESS EXAM IN MEDICARE PATIENT: Primary | ICD-10-CM

## 2021-11-16 DIAGNOSIS — Z11.59 ENCOUNTER FOR SCREENING FOR OTHER VIRAL DISEASES: ICD-10-CM

## 2021-11-16 DIAGNOSIS — M54.41 CHRONIC BILATERAL LOW BACK PAIN WITH BILATERAL SCIATICA: ICD-10-CM

## 2021-11-16 DIAGNOSIS — Z23 FLU VACCINE NEED: ICD-10-CM

## 2021-11-16 DIAGNOSIS — J30.9 ALLERGIC RHINITIS, UNSPECIFIED SEASONALITY, UNSPECIFIED TRIGGER: ICD-10-CM

## 2021-11-16 DIAGNOSIS — G89.29 CHRONIC BILATERAL LOW BACK PAIN WITH BILATERAL SCIATICA: ICD-10-CM

## 2021-11-16 DIAGNOSIS — Z78.0 POSTMENOPAUSAL STATE: ICD-10-CM

## 2021-11-16 DIAGNOSIS — D50.8 IRON DEFICIENCY ANEMIA SECONDARY TO INADEQUATE DIETARY IRON INTAKE: ICD-10-CM

## 2021-11-16 DIAGNOSIS — N39.3 STRESS INCONTINENCE OF URINE: ICD-10-CM

## 2021-11-16 DIAGNOSIS — I10 ESSENTIAL HYPERTENSION: ICD-10-CM

## 2021-11-16 DIAGNOSIS — M54.42 CHRONIC BILATERAL LOW BACK PAIN WITH BILATERAL SCIATICA: ICD-10-CM

## 2021-11-16 DIAGNOSIS — M54.2 NECK PAIN: ICD-10-CM

## 2021-11-16 DIAGNOSIS — Z12.31 ENCOUNTER FOR SCREENING MAMMOGRAM FOR BREAST CANCER: ICD-10-CM

## 2021-11-16 DIAGNOSIS — E55.9 VITAMIN D DEFICIENCY: ICD-10-CM

## 2021-11-16 DIAGNOSIS — Z12.11 COLON CANCER SCREENING: ICD-10-CM

## 2021-11-16 DIAGNOSIS — M79.10 MYALGIA: ICD-10-CM

## 2021-11-16 PROCEDURE — 90686 IIV4 VACC NO PRSV 0.5 ML IM: CPT | Performed by: FAMILY MEDICINE

## 2021-11-16 PROCEDURE — 96160 PT-FOCUSED HLTH RISK ASSMT: CPT | Performed by: FAMILY MEDICINE

## 2021-11-16 PROCEDURE — 99214 OFFICE O/P EST MOD 30 MIN: CPT | Performed by: FAMILY MEDICINE

## 2021-11-16 PROCEDURE — G0439 PPPS, SUBSEQ VISIT: HCPCS | Performed by: FAMILY MEDICINE

## 2021-11-16 PROCEDURE — G0008 ADMIN INFLUENZA VIRUS VAC: HCPCS | Performed by: FAMILY MEDICINE

## 2021-11-16 PROCEDURE — 1159F MED LIST DOCD IN RCRD: CPT | Performed by: FAMILY MEDICINE

## 2021-11-16 PROCEDURE — 1170F FXNL STATUS ASSESSED: CPT | Performed by: FAMILY MEDICINE

## 2021-11-16 RX ORDER — GARLIC 200 MG
TABLET ORAL 4 TIMES DAILY
COMMUNITY
End: 2022-09-01

## 2021-11-16 RX ORDER — DICLOFENAC SODIUM 75 MG/1
75 TABLET, DELAYED RELEASE ORAL 2 TIMES DAILY
Qty: 60 TABLET | Refills: 1 | Status: SHIPPED | OUTPATIENT
Start: 2021-11-16 | End: 2022-02-17

## 2021-11-16 RX ORDER — DULOXETIN HYDROCHLORIDE 60 MG/1
60 CAPSULE, DELAYED RELEASE ORAL DAILY
Qty: 30 CAPSULE | Refills: 5 | Status: SHIPPED | OUTPATIENT
Start: 2021-11-16 | End: 2022-05-13 | Stop reason: SINTOL

## 2021-11-16 NOTE — PROGRESS NOTES
The ABCs of the Annual Wellness Visit  Subsequent Medicare Wellness Visit    Chief Complaint   Patient presents with   • Medicare Wellness-subsequent      Subjective    History of Present Illness:  Marcela Gutierrez is a 51 y.o. female who presents for a Subsequent Medicare Wellness Visit.    The following portions of the patient's history were reviewed and   updated as appropriate: allergies, current medications, past family history, past medical history, past social history, past surgical history and problem list.    Compared to one year ago, the patient feels her physical   health is better.    Compared to one year ago, the patient feels her mental   health is better.    Recent Hospitalizations:  She was not admitted to the hospital during the last year.     Marcela is a 51-year-old female who saw NP Dinah Perez s/p a traumatic fall, she hit her head, and she has a history of  shunt that is located behind her right ear and has been present for about 15 years or more.  The shunt down the spine was broke broken off the result of a previous fall.    She is scared to death the shunt has moved due to neck edema and headaches. CT performed by [facility] Perez showed: Right frontal ventriculostomy catheter tip is in the region of the 3rd ventricle, in unchanged position.  The ventricles are normal in size, position, and configuration.  Sulci are not abnormally prominent.No abnormal gray or white matter density is appreciated.  There is no CT evidence of acute intracranial hemorrhage, mass, or mass effect.The orbits have a normal appearance.The paranasal sinuses, middle ears, and mastoid air cells are well aerated.  No fractures are seen on bone window images.  Postoperative changes are seen at C1-2, with wires in the posterior elements.The mandible and facial bones appear intact      She has lost over 100 #'s on her own    Today she is very concerned about her pain, which is moderate to severe, her pain is in the neck area  and shoulder blades, and lower lumbar spine.   She also has chronic knee pain due to OA, and she thinks she has fibromyalgia.  She has UE weakness and difficulty opening bottles.  She is going to the gym and working on her strength.  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  She is frustrated, states some days her pain is so bad she cant get out of the bed.     Current Medical Providers:  Patient Care Team:  Madelin Espinal MD as PCP - General (Family Medicine)  Gilda Dunham MD as Consulting Physician (Urology)    Outpatient Medications Prior to Visit   Medication Sig Dispense Refill   • albuterol sulfate  (90 Base) MCG/ACT inhaler Inhale 2 puffs As Needed.     • Ascorbic Acid (Vitamin C) powder Take  by mouth 4 (Four) Times a Day.     • B Complex Vitamins (VITAMIN B COMPLEX PO) Take 1 tablet by mouth Daily.     • baclofen (LIORESAL) 10 MG tablet Take 10 mg by mouth As Needed.     • BIOTIN PO Take  by mouth.     • Cholecalciferol 125 MCG (5000 UT) tablet Take 4 tablets by mouth Daily.     • COLLAGEN PO Take  by mouth Daily.     • ferrous sulfate 324 MG tablet delayed-release Take 1 tablet by mouth Daily With Breakfast. 30 tablet 1   • folic acid-vit B6-vit B12 (FOLGARD) 2.2-25-1 MG tablet tablet Take 1 tablet by mouth Daily.     • hydroCHLOROthiazide (HYDRODIURIL) 25 MG tablet Take 1 tablet by mouth Daily. 90 tablet 0   • ibuprofen (ADVIL,MOTRIN) 400 MG tablet ibuprofen 400 mg oral tablet take 1 tablet (400 mg) by oral route 3 times per day with food   Suspended     • losartan (COZAAR) 25 MG tablet TAKE 1 TABLET EVERY DAY 90 tablet 0   • Magnesium 250 MG tablet magnesium 250 mg oral tablet take 1 tablet by oral route daily for 30 days   Active     • metoprolol tartrate (LOPRESSOR) 50 MG tablet Take 1 tablet by mouth Daily. 90 tablet 1   • topiramate (TOPAMAX) 100 MG tablet Take 1 tablet by mouth Daily. 90 tablet 0   • Vitamin D-Vitamin K (K2 Plus D3)  100-1000 MCG-UNIT tablet K2 Plus D3 1,000-100 unit-mcg oral tablet take 1 tablet by oral route daily for 30 days   Active     • calcium carbonate EX (Tums Smoothies) 750 MG chewable tablet Chew 300 mg As Needed.     • fluticasone (FLONASE) 50 MCG/ACT nasal spray 1 spray into the nostril(s) as directed by provider As Needed for Rhinitis for up to 90 days. 16 g 2   • FLUoxetine (PROzac) 20 MG capsule Prozac 20 mg oral capsule take 1 capsule (20 mg) by oral route once daily in the evening for 30 days   Active       No facility-administered medications prior to visit.       No opioid medication identified on active medication list. I have reviewed chart for other potential  high risk medication/s and harmful drug interactions in the elderly.          Aspirin is not on active medication list.  Aspirin use is not indicated based on review of current medical condition/s. Risk of harm outweighs potential benefits.  .    Patient Active Problem List   Diagnosis   • OAB (overactive bladder)   • High blood pressure   • Headache disorder   • Sleep apnea   • Anemia   •  (ventriculoperitoneal) shunt status   • Fall   • Right ear pain   • Encounter for annual wellness exam in Medicare patient     Advance Care Planning  Advance Directive is not on file.  ACP discussion was held with the patient during this visit. Patient does not have an advance directive, information provided.    Review of Systems   Constitutional: Negative for chills, fatigue and fever.   HENT: Negative for ear pain, sinus pressure and sore throat.    Respiratory: Negative for cough, shortness of breath and wheezing.    Cardiovascular: Negative for chest pain, palpitations and leg swelling.   Gastrointestinal: Negative for abdominal pain, blood in stool, constipation, diarrhea, nausea and vomiting.   Genitourinary: Negative for dysuria.   Musculoskeletal: Positive for back pain.   Skin: Negative for rash.   Neurological: Negative for dizziness.  "  Psychiatric/Behavioral: Negative for sleep disturbance and suicidal ideas.        Objective    Vitals:    11/16/21 1001   BP: 139/87   BP Location: Right arm   Patient Position: Sitting   Cuff Size: Large Adult   Pulse: 71   Temp: 98.5 °F (36.9 °C)   TempSrc: Oral   Weight: 108 kg (238 lb 6.4 oz)   Height: 160 cm (63\")     BMI Readings from Last 1 Encounters:   11/16/21 42.23 kg/m²   BMI is above normal parameters. Recommendations include: exercise counseling and nutrition counseling    Does the patient have evidence of cognitive impairment? No    Physical Exam  Vitals and nursing note reviewed.   Constitutional:       General: She is not in acute distress.     Appearance: Normal appearance. She is not ill-appearing, toxic-appearing or diaphoretic.   HENT:      Head: Normocephalic and atraumatic.      Right Ear: Tympanic membrane, ear canal and external ear normal.      Left Ear: Tympanic membrane, ear canal and external ear normal.      Nose: No congestion or rhinorrhea.      Mouth/Throat:      Pharynx: Oropharynx is clear. No oropharyngeal exudate or posterior oropharyngeal erythema.   Eyes:      Extraocular Movements: Extraocular movements intact.      Conjunctiva/sclera: Conjunctivae normal.   Cardiovascular:      Rate and Rhythm: Normal rate and regular rhythm.      Heart sounds: Normal heart sounds.   Pulmonary:      Breath sounds: Normal breath sounds. No wheezing, rhonchi or rales.   Abdominal:      General: Abdomen is flat.      Palpations: Abdomen is soft.   Musculoskeletal:      Cervical back: Neck supple. No rigidity.   Lymphadenopathy:      Cervical: No cervical adenopathy.   Skin:     General: Skin is warm and dry.   Neurological:      Mental Status: She is alert and oriented to person, place, and time.   Psychiatric:         Mood and Affect: Mood normal.         Behavior: Behavior normal.                 HEALTH RISK ASSESSMENT    Smoking Status:  Social History     Tobacco Use   Smoking Status Never " Smoker   Smokeless Tobacco Never Used     Alcohol Consumption:  Social History     Substance and Sexual Activity   Alcohol Use Not Currently    Comment: former     Fall Risk Screen:    EVON Fall Risk Assessment has not been completed.    Depression Screening:  PHQ-2/PHQ-9 Depression Screening 11/16/2021   Little interest or pleasure in doing things 0   Feeling down, depressed, or hopeless 0   Total Score 0       Health Habits and Functional and Cognitive Screening:  Functional & Cognitive Status 11/16/2021   Do you have difficulty preparing food and eating? No   Do you have difficulty bathing yourself, getting dressed or grooming yourself? No   Do you have difficulty using the toilet? No   Do you have difficulty moving around from place to place? No   Do you have trouble with steps or getting out of a bed or a chair? No   Current Diet Low Fat Diet        Current Diet Comment keto diet   Dental Exam Not up to date   Eye Exam Up to date   Exercise (times per week) 3 times per week   Current Exercises Include Light Weights;Cardiovascular Workout   Do you need help using the phone?  No   Are you deaf or do you have serious difficulty hearing?  No   Do you need help with transportation? No   Do you need help shopping? No   Do you need help preparing meals?  No   Do you need help with housework?  No   Do you need help with laundry? No   Do you need help taking your medications? No   Do you need help managing money? No   Do you ever drive or ride in a car without wearing a seat belt? No   Have you felt unusual stress, anger or loneliness in the last month? No   Who do you live with? Alone   If you need help, do you have trouble finding someone available to you? No   Do you have difficulty concentrating, remembering or making decisions? Yes       Age-appropriate Screening Schedule:  Refer to the list below for future screening recommendations based on patient's age, sex and/or medical conditions. Orders for these  recommended tests are listed in the plan section. The patient has been provided with a written plan.    Health Maintenance   Topic Date Due   • TDAP/TD VACCINES (1 - Tdap) Never done   • ZOSTER VACCINE (1 of 2) Never done   • MAMMOGRAM  01/30/2021   • INFLUENZA VACCINE  Completed              Assessment/Plan   CMS Preventative Services Quick Reference  Risk Factors Identified During Encounter  Chronic Pain   Fall Risk-High or Moderate  Immunizations Discussed/Encouraged (specific Immunizations; Tdap, Shingrix and COVID19  Obesity/Overweight   Urinary Incontinence  The above risks/problems have been discussed with the patient.  Follow up actions/plans if indicated are seen below in the Assessment/Plan Section.  Pertinent information has been shared with the patient in the After Visit Summary.    Diagnoses and all orders for this visit:    1. Encounter for annual wellness exam in Medicare patient (Primary)  Assessment & Plan:  MEDICARE WELLNESS EXAM-SHE IS DUE FOR HER  MAMMOGRAM, UTD ON DEXA/COLONOSCOPY, PAP AND PELVIC EXAM, RECOMMEND VACCINATIONS INCLUDING:  SHINGLES VACCINE/COVID/FLU VACCINE, MINIMAL FALL RISK, NO MEMORY ISSUES, NO SIGNS/SYMPTOMS OF DEPRESSION, SHE IS ABLE TO DRIVE AND DOES NOT NEED ASSISTANCE WITH ADL'S/FINANCES, HEARING IS GOOD, RECOMMEND A LIVING WILL, CURRENT DOCTOR LIST UPDATED.  RECOMMEND YEARLY EYE/DENTAL EXAMS AND FLU VACCINATIONS      2. Encounter for screening mammogram for breast cancer  -     Mammo Screening Digital Tomosynthesis Bilateral With CAD; Future    3. Postmenopausal state  -     DEXA Bone Density Axial; Future    4. Colon cancer screening    5. Flu vaccine need  -     FluLaval/Fluarix/Fluzone >6 Months ([id number])    6. Neck pain  -     Ambulatory Referral to Physical Therapy Evaluate and treat  -     DULoxetine (CYMBALTA) 60 MG capsule; Take 1 capsule by mouth Daily.  Dispense: 30 capsule; Refill: 5  -     diclofenac (VOLTAREN) 75 MG EC tablet; Take 1 tablet by mouth 2 (Two)  Times a Day for 60 days.  Dispense: 60 tablet; Refill: 1    7. Chronic bilateral low back pain with bilateral sciatica  -     Ambulatory Referral to Physical Therapy Evaluate and treat  -     DULoxetine (CYMBALTA) 60 MG capsule; Take 1 capsule by mouth Daily.  Dispense: 30 capsule; Refill: 5  -     diclofenac (VOLTAREN) 75 MG EC tablet; Take 1 tablet by mouth 2 (Two) Times a Day for 60 days.  Dispense: 60 tablet; Refill: 1    8. Myalgia  -     DULoxetine (CYMBALTA) 60 MG capsule; Take 1 capsule by mouth Daily.  Dispense: 30 capsule; Refill: 5    9. Essential hypertension  -     Comprehensive Metabolic Panel; Future  -     Lipid Panel; Future    10. Vitamin D deficiency  -     Vitamin D 25 hydroxy; Future    11. Allergic rhinitis, unspecified seasonality, unspecified trigger    12. Iron deficiency anemia secondary to inadequate dietary iron intake  -     CBC (No Diff); Future  -     Iron Profile; Future    13. Encounter for screening for other viral diseases  -     Hepatitis C antibody; Future    14. Stress incontinence of urine  Comments:  SHE SEES DR VALERO, ON [facility] she cant remember name of it.      Follow Up:   Return in about 3 months (around 2/16/2022) for Recheck.     An After Visit Summary and PPPS were made available to the patient.

## 2021-11-16 NOTE — ASSESSMENT & PLAN NOTE
MEDICARE WELLNESS EXAM-SHE IS DUE FOR HER  MAMMOGRAM, UTD ON DEXA/COLONOSCOPY, PAP AND PELVIC EXAM, RECOMMEND VACCINATIONS INCLUDING:  SHINGLES VACCINE/COVID/FLU VACCINE, MINIMAL FALL RISK, NO MEMORY ISSUES, NO SIGNS/SYMPTOMS OF DEPRESSION, SHE IS ABLE TO DRIVE AND DOES NOT NEED ASSISTANCE WITH ADL'S/FINANCES, HEARING IS GOOD, RECOMMEND A LIVING WILL, CURRENT DOCTOR LIST UPDATED.  RECOMMEND YEARLY EYE/DENTAL EXAMS AND FLU VACCINATIONS

## 2021-11-19 RX ORDER — TOPIRAMATE 100 MG/1
100 TABLET, FILM COATED ORAL DAILY
Qty: 90 TABLET | Refills: 0 | Status: SHIPPED | OUTPATIENT
Start: 2021-11-19 | End: 2022-02-15

## 2021-12-17 RX ORDER — LOSARTAN POTASSIUM 25 MG/1
TABLET ORAL
Qty: 90 TABLET | Refills: 0 | Status: SHIPPED | OUTPATIENT
Start: 2021-12-17 | End: 2022-06-07

## 2021-12-20 RX ORDER — HYDROCHLOROTHIAZIDE 25 MG/1
TABLET ORAL
Qty: 90 TABLET | Refills: 0 | Status: SHIPPED | OUTPATIENT
Start: 2021-12-20 | End: 2022-03-21

## 2021-12-30 ENCOUNTER — TELEPHONE (OUTPATIENT)
Dept: FAMILY MEDICINE CLINIC | Age: 51
End: 2021-12-30

## 2021-12-30 NOTE — TELEPHONE ENCOUNTER
Caller: Marcela Gutierrez    Relationship: Self    Best call back number: [phone]    What orders are you requesting (i.e. lab or imaging): LABS    In what timeframe would the patient need to come in: ASAP    Where will you receive your lab/imaging services:    Additional notes: PLEASE CALL PATIENT WHEN THE ORDERS ARE READY

## 2021-12-30 NOTE — TELEPHONE ENCOUNTER
Called and spoke with pt. Informed her that there are labs in her chart showing active. She stated she thought she got some sort of email or text that said her lab orders had . Spoke with one of the registrars at the lab to have them check and they stated they were showing active lab orders on their end as well. Pt will come to have lab work done soon-clr

## 2022-01-13 ENCOUNTER — HOSPITAL ENCOUNTER (OUTPATIENT)
Dept: MAMMOGRAPHY | Facility: HOSPITAL | Age: 52
Discharge: HOME | End: 2022-01-13

## 2022-01-13 ENCOUNTER — LAB (OUTPATIENT)
Dept: LAB | Facility: HOSPITAL | Age: 52
End: 2022-01-13

## 2022-01-13 ENCOUNTER — HOSPITAL ENCOUNTER (OUTPATIENT)
Dept: BONE DENSITY | Facility: HOSPITAL | Age: 52
Discharge: HOME | End: 2022-01-13

## 2022-01-13 DIAGNOSIS — D50.8 IRON DEFICIENCY ANEMIA SECONDARY TO INADEQUATE DIETARY IRON INTAKE: ICD-10-CM

## 2022-01-13 DIAGNOSIS — Z12.31 ENCOUNTER FOR SCREENING MAMMOGRAM FOR BREAST CANCER: ICD-10-CM

## 2022-01-13 DIAGNOSIS — Z78.0 POSTMENOPAUSAL STATE: ICD-10-CM

## 2022-01-13 DIAGNOSIS — E55.9 VITAMIN D DEFICIENCY: ICD-10-CM

## 2022-01-13 DIAGNOSIS — I10 ESSENTIAL HYPERTENSION: ICD-10-CM

## 2022-01-13 DIAGNOSIS — Z11.59 ENCOUNTER FOR SCREENING FOR OTHER VIRAL DISEASES: ICD-10-CM

## 2022-01-13 LAB
25(OH)D3 SERPL-MCNC: 48.6 NG/ML (ref 30–100)
ALBUMIN SERPL-MCNC: 4.5 G/DL (ref 3.5–5.2)
ALBUMIN/GLOB SERPL: 1.4 G/DL
ALP SERPL-CCNC: 63 U/L (ref 39–117)
ALT SERPL W P-5'-P-CCNC: 18 U/L (ref 1–33)
ANION GAP SERPL CALCULATED.3IONS-SCNC: 8.9 MMOL/L (ref 5–15)
AST SERPL-CCNC: 18 U/L (ref 1–32)
BILIRUB SERPL-MCNC: 0.4 MG/DL (ref 0–1.2)
BUN SERPL-MCNC: 14 MG/DL (ref 6–20)
BUN/CREAT SERPL: 17.7 (ref 7–25)
CALCIUM SPEC-SCNC: 10.2 MG/DL (ref 8.6–10.5)
CHLORIDE SERPL-SCNC: 103 MMOL/L (ref 98–107)
CHOLEST SERPL-MCNC: 207 MG/DL (ref 0–200)
CO2 SERPL-SCNC: 28.1 MMOL/L (ref 22–29)
CREAT SERPL-MCNC: 0.79 MG/DL (ref 0.57–1)
DEPRECATED RDW RBC AUTO: 39.5 FL (ref 37–54)
ERYTHROCYTE [DISTWIDTH] IN BLOOD BY AUTOMATED COUNT: 12.2 % (ref 12.3–15.4)
GFR SERPL CREATININE-BSD FRML MDRD: 76 ML/MIN/1.73
GLOBULIN UR ELPH-MCNC: 3.3 GM/DL
GLUCOSE SERPL-MCNC: 99 MG/DL (ref 65–99)
HCT VFR BLD AUTO: 41.2 % (ref 34–46.6)
HCV AB SER DONR QL: NORMAL
HDLC SERPL-MCNC: 50 MG/DL (ref 40–60)
HGB BLD-MCNC: 14.3 G/DL (ref 12–15.9)
IRON 24H UR-MRATE: 86 MCG/DL (ref 37–145)
IRON SATN MFR SERPL: 23 % (ref 20–50)
LDLC SERPL CALC-MCNC: 134 MG/DL (ref 0–100)
LDLC/HDLC SERPL: 2.63 {RATIO}
MCH RBC QN AUTO: 30 PG (ref 26.6–33)
MCHC RBC AUTO-ENTMCNC: 34.7 G/DL (ref 31.5–35.7)
MCV RBC AUTO: 86.6 FL (ref 79–97)
PLATELET # BLD AUTO: 336 10*3/MM3 (ref 140–450)
PMV BLD AUTO: 8.7 FL (ref 6–12)
POTASSIUM SERPL-SCNC: 3.6 MMOL/L (ref 3.5–5.2)
PROT SERPL-MCNC: 7.8 G/DL (ref 6–8.5)
RBC # BLD AUTO: 4.76 10*6/MM3 (ref 3.77–5.28)
SODIUM SERPL-SCNC: 140 MMOL/L (ref 136–145)
TIBC SERPL-MCNC: 374 MCG/DL (ref 298–536)
TRANSFERRIN SERPL-MCNC: 251 MG/DL (ref 200–360)
TRIGL SERPL-MCNC: 127 MG/DL (ref 0–150)
VLDLC SERPL-MCNC: 23 MG/DL (ref 5–40)
WBC NRBC COR # BLD: 6.26 10*3/MM3 (ref 3.4–10.8)

## 2022-01-13 PROCEDURE — 84466 ASSAY OF TRANSFERRIN: CPT

## 2022-01-13 PROCEDURE — 77063 BREAST TOMOSYNTHESIS BI: CPT

## 2022-01-13 PROCEDURE — 85027 COMPLETE CBC AUTOMATED: CPT

## 2022-01-13 PROCEDURE — 77080 DXA BONE DENSITY AXIAL: CPT

## 2022-01-13 PROCEDURE — 83540 ASSAY OF IRON: CPT

## 2022-01-13 PROCEDURE — 36415 COLL VENOUS BLD VENIPUNCTURE: CPT

## 2022-01-13 PROCEDURE — 86803 HEPATITIS C AB TEST: CPT

## 2022-01-13 PROCEDURE — 80061 LIPID PANEL: CPT

## 2022-01-13 PROCEDURE — 77067 SCR MAMMO BI INCL CAD: CPT

## 2022-01-13 PROCEDURE — 80053 COMPREHEN METABOLIC PANEL: CPT

## 2022-01-13 PROCEDURE — 82306 VITAMIN D 25 HYDROXY: CPT

## 2022-02-15 RX ORDER — TOPIRAMATE 100 MG/1
TABLET, FILM COATED ORAL
Qty: 90 TABLET | Refills: 0 | Status: SHIPPED | OUTPATIENT
Start: 2022-02-15 | End: 2022-05-19

## 2022-02-16 DIAGNOSIS — M54.41 CHRONIC BILATERAL LOW BACK PAIN WITH BILATERAL SCIATICA: ICD-10-CM

## 2022-02-16 DIAGNOSIS — M54.42 CHRONIC BILATERAL LOW BACK PAIN WITH BILATERAL SCIATICA: ICD-10-CM

## 2022-02-16 DIAGNOSIS — G89.29 CHRONIC BILATERAL LOW BACK PAIN WITH BILATERAL SCIATICA: ICD-10-CM

## 2022-02-16 DIAGNOSIS — M54.2 NECK PAIN: ICD-10-CM

## 2022-02-17 RX ORDER — DICLOFENAC SODIUM 75 MG/1
TABLET, DELAYED RELEASE ORAL
Qty: 60 TABLET | Refills: 0 | Status: SHIPPED | OUTPATIENT
Start: 2022-02-17 | End: 2022-03-14

## 2022-03-02 ENCOUNTER — TELEPHONE (OUTPATIENT)
Dept: PEDIATRICS | Facility: OTHER | Age: 52
End: 2022-03-02

## 2022-03-02 ENCOUNTER — TELEPHONE (OUTPATIENT)
Dept: FAMILY MEDICINE CLINIC | Age: 52
End: 2022-03-02

## 2022-03-02 RX ORDER — METOPROLOL TARTRATE 50 MG/1
50 TABLET, FILM COATED ORAL DAILY
Qty: 90 TABLET | Refills: 0 | Status: SHIPPED | OUTPATIENT
Start: 2022-03-02 | End: 2022-03-07 | Stop reason: SDUPTHER

## 2022-03-02 NOTE — TELEPHONE ENCOUNTER
Caller: ANTONINA    Relationship: [employer] [facility]    Best call back number: [phone]    Requested Prescriptions:   Requested Prescriptions     Pending Prescriptions Disp Refills   • metoprolol tartrate (LOPRESSOR) 50 MG tablet 90 tablet 1     Sig: Take 1 tablet by mouth Daily.        Pharmacy where request should be sent: [facility], [place] - [address] - [phone]  - [phone]     Additional details provided by patient: PATIENT IS OUT AND WANTS IT SENT TO [facility]    Does the patient have less than a 3 day supply:  [x] Yes  [] No    Hamlet Bobo Rep   03/02/22 09:02 EST

## 2022-03-07 RX ORDER — METOPROLOL TARTRATE 50 MG/1
50 TABLET, FILM COATED ORAL DAILY
Qty: 90 TABLET | Refills: 0 | Status: SHIPPED | OUTPATIENT
Start: 2022-03-07 | End: 2022-05-31

## 2022-03-12 DIAGNOSIS — M54.41 CHRONIC BILATERAL LOW BACK PAIN WITH BILATERAL SCIATICA: ICD-10-CM

## 2022-03-12 DIAGNOSIS — M54.2 NECK PAIN: ICD-10-CM

## 2022-03-12 DIAGNOSIS — M54.42 CHRONIC BILATERAL LOW BACK PAIN WITH BILATERAL SCIATICA: ICD-10-CM

## 2022-03-12 DIAGNOSIS — G89.29 CHRONIC BILATERAL LOW BACK PAIN WITH BILATERAL SCIATICA: ICD-10-CM

## 2022-03-14 RX ORDER — DICLOFENAC SODIUM 75 MG/1
TABLET, DELAYED RELEASE ORAL
Qty: 180 TABLET | Refills: 0 | Status: SHIPPED | OUTPATIENT
Start: 2022-03-14 | End: 2022-06-07

## 2022-03-21 RX ORDER — HYDROCHLOROTHIAZIDE 25 MG/1
TABLET ORAL
Qty: 90 TABLET | Refills: 0 | Status: SHIPPED | OUTPATIENT
Start: 2022-03-21 | End: 2022-06-07

## 2022-04-13 RX ORDER — ALBUTEROL SULFATE 90 UG/1
2 AEROSOL, METERED RESPIRATORY (INHALATION) AS NEEDED
Qty: 18 G | Refills: 2 | Status: SHIPPED | OUTPATIENT
Start: 2022-04-13 | End: 2022-04-18 | Stop reason: SDUPTHER

## 2022-04-18 ENCOUNTER — TELEPHONE (OUTPATIENT)
Dept: FAMILY MEDICINE CLINIC | Age: 52
End: 2022-04-18

## 2022-04-18 DIAGNOSIS — J45.909 ASTHMA, UNSPECIFIED ASTHMA SEVERITY, UNSPECIFIED WHETHER COMPLICATED, UNSPECIFIED WHETHER PERSISTENT: Primary | ICD-10-CM

## 2022-04-18 RX ORDER — ALBUTEROL SULFATE 90 UG/1
2 AEROSOL, METERED RESPIRATORY (INHALATION) 4 TIMES DAILY PRN
Qty: 18 G | Refills: 2 | Status: SHIPPED | OUTPATIENT
Start: 2022-04-18 | End: 2022-06-07 | Stop reason: SDUPTHER

## 2022-04-18 NOTE — TELEPHONE ENCOUNTER
Pharmacy Name: [facility] - [facility] [address] - [phone]  - [phone]      Pharmacy representative name: HERON    Pharmacy representative phone number: [phone]    What medication are you calling in regards to: ALBUTEROL INHALER    What question does the pharmacy have: THE PHARMACY STATED THIS PRESCRIPTION IS MISSING THE FREQUENCY. SHE WOULD LIKE A CALL BACK TO CONFIRM FREQUENCY OR AN UPDATED PRESCRIPTION  ASAP    Who is the provider that prescribed the medication: SHARRON BARRERA

## 2022-04-28 ENCOUNTER — OFFICE VISIT (OUTPATIENT)
Dept: PLASTIC SURGERY | Facility: CLINIC | Age: 52
End: 2022-04-28

## 2022-04-28 VITALS
BODY MASS INDEX: 43.05 KG/M2 | WEIGHT: 243 LBS | TEMPERATURE: 98.6 F | DIASTOLIC BLOOD PRESSURE: 88 MMHG | SYSTOLIC BLOOD PRESSURE: 148 MMHG | OXYGEN SATURATION: 100 % | HEIGHT: 63 IN | HEART RATE: 67 BPM

## 2022-04-28 DIAGNOSIS — L98.7 EXCESS SKIN OF UPPER EXTREMITY: Primary | ICD-10-CM

## 2022-04-28 PROCEDURE — COS64: Performed by: SURGERY

## 2022-05-04 PROBLEM — L91.0 HYPERTROPHIC SCAR: Status: ACTIVE | Noted: 2022-05-04

## 2022-05-04 PROBLEM — L98.7 EXCESS SKIN OF UPPER EXTREMITY: Status: ACTIVE | Noted: 2022-05-04

## 2022-05-13 ENCOUNTER — TELEMEDICINE (OUTPATIENT)
Dept: FAMILY MEDICINE CLINIC | Age: 52
End: 2022-05-13

## 2022-05-13 DIAGNOSIS — R05.9 COUGH: Primary | ICD-10-CM

## 2022-05-13 DIAGNOSIS — J45.909 ASTHMA, UNSPECIFIED ASTHMA SEVERITY, UNSPECIFIED WHETHER COMPLICATED, UNSPECIFIED WHETHER PERSISTENT: ICD-10-CM

## 2022-05-13 PROCEDURE — 99212 OFFICE O/P EST SF 10 MIN: CPT | Performed by: NURSE PRACTITIONER

## 2022-05-13 RX ORDER — METHYLPREDNISOLONE 4 MG/1
TABLET ORAL
Qty: 21 EACH | Refills: 0 | Status: SHIPPED | OUTPATIENT
Start: 2022-05-13 | End: 2022-06-30

## 2022-05-13 NOTE — PROGRESS NOTES
Chief Complaint  Cough (Pt had sinus surgery 30 years and states she will aspirate in her sleep at times. 5/5 - celebrated Vivian Bray, overindulged and woke up at 3am and pt reports she aspirated. She has had a cough since. )    Subjective    Patient is a 51-year-old female being seen today by video visits with complaints of complete adductive cough and shortness of breath that began after aspiration on May 5.  Patient denies chest pain or fever at this time.    Patient consent to video visit.  Patient identity confirmed.  [employer] video call was used.  Provider in office at desktop  Patient at home          Marcela Gutierrez presents to [facility]  Cough  This is a new problem. The current episode started in the past 7 days. The problem has been unchanged. The cough is productive of sputum. Associated symptoms include heartburn and shortness of breath. Pertinent negatives include no chills or fever. The symptoms are aggravated by lying down. She has tried ipratropium inhaler for the symptoms. The treatment provided no relief. Her past medical history is significant for bronchitis.       Objective   Vital Signs:  There were no vitals taken for this visit.          Physical Exam  HENT:      Head: Normocephalic.   Pulmonary:      Effort: No respiratory distress.   Neurological:      Mental Status: She is alert and oriented to person, place, and time.   Psychiatric:         Mood and Affect: Mood normal.        Result Review :                 Assessment and Plan    Diagnoses and all orders for this visit:    1. Cough (Primary)  -     methylPREDNISolone (MEDROL) 4 MG dose pack; Take as directed on package instructions.  Dispense: 21 each; Refill: 0    2. Asthma, unspecified asthma severity, unspecified whether complicated, unspecified whether persistent  -     methylPREDNISolone (MEDROL) 4 MG dose pack; Take as directed on package instructions.  Dispense: 21 each; Refill: 0              Follow Up   Return if symptoms worsen or fail to improve.  Patient was given instructions and counseling regarding her condition or for health maintenance advice. Please see specific information pulled into the AVS if appropriate.

## 2022-05-16 ENCOUNTER — TELEPHONE (OUTPATIENT)
Dept: PLASTIC SURGERY | Facility: CLINIC | Age: 52
End: 2022-05-16

## 2022-05-19 RX ORDER — TOPIRAMATE 100 MG/1
TABLET, FILM COATED ORAL
Qty: 90 TABLET | Refills: 0 | Status: SHIPPED | OUTPATIENT
Start: 2022-05-19 | End: 2022-10-13

## 2022-05-31 RX ORDER — METOPROLOL TARTRATE 50 MG/1
TABLET, FILM COATED ORAL
Qty: 90 TABLET | Refills: 0 | Status: SHIPPED | OUTPATIENT
Start: 2022-05-31 | End: 2022-08-05 | Stop reason: SDUPTHER

## 2022-06-04 DIAGNOSIS — M54.2 NECK PAIN: ICD-10-CM

## 2022-06-04 DIAGNOSIS — M54.42 CHRONIC BILATERAL LOW BACK PAIN WITH BILATERAL SCIATICA: ICD-10-CM

## 2022-06-04 DIAGNOSIS — G89.29 CHRONIC BILATERAL LOW BACK PAIN WITH BILATERAL SCIATICA: ICD-10-CM

## 2022-06-04 DIAGNOSIS — M54.41 CHRONIC BILATERAL LOW BACK PAIN WITH BILATERAL SCIATICA: ICD-10-CM

## 2022-06-07 DIAGNOSIS — J45.909 ASTHMA, UNSPECIFIED ASTHMA SEVERITY, UNSPECIFIED WHETHER COMPLICATED, UNSPECIFIED WHETHER PERSISTENT: ICD-10-CM

## 2022-06-07 RX ORDER — HYDROCHLOROTHIAZIDE 25 MG/1
TABLET ORAL
Qty: 90 TABLET | Refills: 0 | Status: SHIPPED | OUTPATIENT
Start: 2022-06-07 | End: 2023-02-20

## 2022-06-07 RX ORDER — LOSARTAN POTASSIUM 25 MG/1
TABLET ORAL
Qty: 90 TABLET | Refills: 0 | Status: SHIPPED | OUTPATIENT
Start: 2022-06-07 | End: 2022-06-07 | Stop reason: SDUPTHER

## 2022-06-07 RX ORDER — LOSARTAN POTASSIUM 25 MG/1
25 TABLET ORAL DAILY
Qty: 12 TABLET | Refills: 0 | Status: SHIPPED | OUTPATIENT
Start: 2022-06-07 | End: 2022-06-30

## 2022-06-07 RX ORDER — ALBUTEROL SULFATE 90 UG/1
2 AEROSOL, METERED RESPIRATORY (INHALATION) 4 TIMES DAILY PRN
Qty: 18 G | Refills: 0 | Status: SHIPPED | OUTPATIENT
Start: 2022-06-07 | End: 2023-01-28 | Stop reason: SDUPTHER

## 2022-06-07 RX ORDER — DICLOFENAC SODIUM 75 MG/1
TABLET, DELAYED RELEASE ORAL
Qty: 180 TABLET | Refills: 0 | Status: SHIPPED | OUTPATIENT
Start: 2022-06-07 | End: 2022-06-07

## 2022-06-21 ENCOUNTER — TELEPHONE (OUTPATIENT)
Dept: UROLOGY | Facility: CLINIC | Age: 52
End: 2022-06-21

## 2022-06-21 NOTE — TELEPHONE ENCOUNTER
CALLED PT TO SEE IF SHE WANTED TO RS CX APPT FROM 6/29/22/PT SAID SHE DID NOT WANT TO RS APPT/ANYTHING ELSE TO DO??

## 2022-06-27 ENCOUNTER — TELEPHONE (OUTPATIENT)
Dept: FAMILY MEDICINE CLINIC | Age: 52
End: 2022-06-27

## 2022-06-27 NOTE — TELEPHONE ENCOUNTER
HUB TO READ    Attempted to contact patient and LVM regarding patient being due for [facility] exam. Please schedule with PCP or Mabel Santiago.

## 2022-06-30 ENCOUNTER — OFFICE VISIT (OUTPATIENT)
Dept: FAMILY MEDICINE CLINIC | Age: 52
End: 2022-06-30

## 2022-06-30 VITALS
BODY MASS INDEX: 43.94 KG/M2 | DIASTOLIC BLOOD PRESSURE: 93 MMHG | HEART RATE: 71 BPM | OXYGEN SATURATION: 96 % | HEIGHT: 63 IN | WEIGHT: 248 LBS | SYSTOLIC BLOOD PRESSURE: 141 MMHG

## 2022-06-30 DIAGNOSIS — E66.01 MORBID (SEVERE) OBESITY DUE TO EXCESS CALORIES: ICD-10-CM

## 2022-06-30 DIAGNOSIS — Z98.2 VP (VENTRICULOPERITONEAL) SHUNT STATUS: ICD-10-CM

## 2022-06-30 DIAGNOSIS — D50.8 IRON DEFICIENCY ANEMIA SECONDARY TO INADEQUATE DIETARY IRON INTAKE: ICD-10-CM

## 2022-06-30 DIAGNOSIS — N32.81 OAB (OVERACTIVE BLADDER): ICD-10-CM

## 2022-06-30 DIAGNOSIS — R51.9 HEADACHE DISORDER: Primary | ICD-10-CM

## 2022-06-30 DIAGNOSIS — R04.0 EPISTAXIS: ICD-10-CM

## 2022-06-30 DIAGNOSIS — I10 PRIMARY HYPERTENSION: ICD-10-CM

## 2022-06-30 PROCEDURE — 99214 OFFICE O/P EST MOD 30 MIN: CPT | Performed by: FAMILY MEDICINE

## 2022-06-30 RX ORDER — LOSARTAN POTASSIUM 50 MG/1
50 TABLET ORAL DAILY
Qty: 90 TABLET | Refills: 1 | Status: SHIPPED | OUTPATIENT
Start: 2022-06-30 | End: 2022-09-01

## 2022-06-30 NOTE — PROGRESS NOTES
Marcela Gutierrez presents to [facility].    Chief Complaint: follow up med refill, BP    Subjective       History of Present Illness:  HPI   Vitamin D def is stable on vitamin D supplementation    Hypertension is treated with  HCTZ/losartan and metoprolol.  She tolerates meds well without side effects.  Her BP have been high at home      H/o hypercholesterolemia, borderline with chol 207.  Diet controlled    Marcela was seen back in Nov s/p fall in which she hit her head, she has a history of  shunt that is located behind her right ear and has been present for about 15 years or more.  The shunt down the spine was broke broken off as the result of a previous fall.    She is scared to death the shunt has moved due to neck edema and headaches. CThead in Nov showed right frontal ventriculostomy catheter tip is in the region of the 3rd ventricle, in unchanged position.  She had neck and LBP s/p fall and she was treated with diclofenac and cymbalta.  She stopped the cymbalta due to weight gain and more generalized fatigue. 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  She has a scar on her back she wants fixed a getting her excess skin removed so she went to a plastic surgeon Carmen Quezada for further eval    H/o eating disorder and her weight is going up    She c/o more Headaches.  L nares epistaxis with notable scab    She also has chronic knee pain due to OA, and she thinks she has fibromyalgia. She is on hyalauranic acid She has UE weakness and difficulty opening bottles.        Review of Systems:  Review of Systems   Constitutional: Positive for fatigue. Negative for chills and fever.   HENT: Negative for ear pain, sinus pressure and sore throat.    Eyes: Negative for blurred vision and double vision.   Respiratory: Negative for cough, shortness of  breath and wheezing.    Cardiovascular: Negative for chest pain and palpitations.   Gastrointestinal: Negative for abdominal pain, blood in stool, constipation, diarrhea, nausea and vomiting.   Skin: Negative for rash.   Neurological: Positive for headache. Negative for dizziness.   Psychiatric/Behavioral: Negative for depressed mood.        Objective   Medical History:  Past Medical History:   • Allergic rhinitis   • Anemia   • Arthritis   • Asthma   • Bladder disorder   • Condition not found    ulcer   • Contusion of right shoulder region    subsequent encounter   • Esophageal reflux   • GERD (gastroesophageal reflux disease)   • High blood pressure   • Limb pain   • Limb swelling   • Pseudotumor cerebri   • Rectal bleeding   • Shortness of breath   • Sleep apnea   • Stroke (HCC)    left sided weakness that occurred 16 years ago, onset assoc with surgical intervention with stent placement (multiple) for pseudotumor cerebri   • Subacromial impingement, right     Past Surgical History:   • ABDOMINAL HYSTERECTOMY   • APPENDECTOMY   • BACK SURGERY   •  SECTION   • CHOLECYSTECTOMY   • COLONOSCOPY   • CYST REMOVAL   • ENDOSCOPY   • FOOT SURGERY   • GALLBLADDER SURGERY   • GASTRIC BANDING   • INGUINAL HERNIA REPAIR   • LAMINECTOMY   • OOPHORECTOMY   • SHUNT EXTERNALIZATION   • TUBAL ABDOMINAL LIGATION      Family History   Adopted: Yes   Family history unknown: Yes     Social History     Tobacco Use   • Smoking status: Never Smoker   • Smokeless tobacco: Never Used   Substance Use Topics   • Alcohol use: Yes     Comment: social       Health Maintenance Due   Topic Date Due   • COVID-19 Vaccine (1) Never done   • Pneumococcal Vaccine 0-64 (1 - PCV) Never done   • TDAP/TD VACCINES (1 - Tdap) Never done   • ZOSTER VACCINE (1 of 2) Never done        Immunization History   Administered Date(s) Administered   • FluLaval/Fluarix/Fluzone >6 2021   • Fluzone Split Quad (Multi-dose) 10/01/2020       Allergies    Allergen Reactions   • Green Dyes Unknown - High Severity   • Latex Hives   • Mastisol [Wound Dressing Adhesive] Unknown - High Severity   • Morphine Hallucinations   • Oxycodone-Acetaminophen Hives   • Zoloft [Sertraline] Myalgia   • Adhesive Tape Rash        Medications:  Current Outpatient Medications on File Prior to Visit   Medication Sig   • albuterol sulfate  (90 Base) MCG/ACT inhaler Inhale 2 puffs 4 (Four) Times a Day As Needed for Wheezing.   • Ascorbic Acid (Vitamin C) powder Take  by mouth 4 (Four) Times a Day.   • B Complex Vitamins (VITAMIN B COMPLEX PO) Take 1 tablet by mouth Daily.   • BIOTIN PO Take  by mouth.   • calcium carbonate EX (TUMS EX) 750 MG chewable tablet Chew 300 mg As Needed.   • Cholecalciferol 125 MCG (5000 UT) tablet Take 4 tablets by mouth Daily.   • COLLAGEN PO Take  by mouth Daily.   • folic acid-vit B6-vit B12 (FOLGARD) 2.2-25-1 MG tablet tablet Take 1 tablet by mouth Daily.   • hydroCHLOROthiazide (HYDRODIURIL) 25 MG tablet TAKE 1 TABLET EVERY DAY   • Magnesium 250 MG tablet magnesium 250 mg oral tablet take 1 tablet by oral route daily for 30 days   Active   • metoprolol tartrate (LOPRESSOR) 50 MG tablet TAKE ONE TABLET BY MOUTH DAILY   • topiramate (TOPAMAX) 100 MG tablet TAKE 1 TABLET EVERY DAY   • Vitamin D-Vitamin K (K2 Plus D3) 100-1000 MCG-UNIT tablet K2 Plus D3 1,000-100 unit-mcg oral tablet take 1 tablet by oral route daily for 30 days   Active   • [DISCONTINUED] losartan (COZAAR) 25 MG tablet Take 1 tablet by mouth Daily.   • fluticasone (FLONASE) 50 MCG/ACT nasal spray 1 spray into the nostril(s) as directed by provider As Needed for Rhinitis for up to 90 days.   • [DISCONTINUED] baclofen (LIORESAL) 10 MG tablet Take 10 mg by mouth As Needed.   • [DISCONTINUED] methylPREDNISolone (MEDROL) 4 MG dose pack Take as directed on package instructions.     No current facility-administered medications on file prior to visit.       Vital Signs:   /93 (BP  "Location: Left arm, Patient Position: Sitting, Cuff Size: Adult)   Pulse 71   Ht 160 cm (63\")   Wt 112 kg (248 lb)   SpO2 96% Comment: Room air  BMI 43.93 kg/m²       Physical Exam:  Physical Exam  Vitals and nursing note reviewed.   Constitutional:       General: She is not in acute distress.     Appearance: Normal appearance. She is not ill-appearing, toxic-appearing or diaphoretic.   HENT:      Head: Normocephalic and atraumatic.      Right Ear: Tympanic membrane, ear canal and external ear normal.      Left Ear: Tympanic membrane, ear canal and external ear normal.      Nose: No congestion or rhinorrhea.      Mouth/Throat:      Mouth: Mucous membranes are moist.      Pharynx: Oropharynx is clear. No oropharyngeal exudate or posterior oropharyngeal erythema.   Eyes:      Extraocular Movements: Extraocular movements intact.      Conjunctiva/sclera: Conjunctivae normal.      Pupils: Pupils are equal, round, and reactive to light.   Cardiovascular:      Rate and Rhythm: Normal rate and regular rhythm.      Heart sounds: Normal heart sounds.   Pulmonary:      Effort: Pulmonary effort is normal.      Breath sounds: Normal breath sounds. No wheezing, rhonchi or rales.   Abdominal:      General: Abdomen is flat.      Palpations: Abdomen is soft.   Musculoskeletal:      Cervical back: Neck supple. No rigidity.   Lymphadenopathy:      Cervical: No cervical adenopathy.   Skin:     General: Skin is warm and dry.   Neurological:      Mental Status: She is alert and oriented to person, place, and time.   Psychiatric:         Mood and Affect: Mood normal.         Behavior: Behavior normal.         Result Review      The following data was reviewed by Madelin Espinal MD on 06/30/2022.  Lab Results   Component Value Date    WBC 6.26 01/13/2022    HGB 14.3 01/13/2022    HCT 41.2 01/13/2022    MCV 86.6 01/13/2022     01/13/2022     Lab Results   Component Value Date    GLUCOSE 99 01/13/2022    BUN 14 01/13/2022    " CREATININE 0.79 01/13/2022    EGFRIFNONA 76 01/13/2022    BCR 17.7 01/13/2022    K 3.6 01/13/2022    CO2 28.1 01/13/2022    CALCIUM 10.2 01/13/2022    ALBUMIN 4.50 01/13/2022    LABIL2 1.2 (L) 10/15/2019    AST 18 01/13/2022    ALT 18 01/13/2022     Lab Results   Component Value Date    CHOL 207 (H) 01/13/2022    TRIG 127 01/13/2022    HDL 50 01/13/2022     (H) 01/13/2022     No results found for: TSH  No results found for: HGBA1C  No results found for: PSA                    Assessment and Plan:          Diagnoses and all orders for this visit:    1. Headache disorder (Primary)  Comments:  well controlled on topamax    2. Iron deficiency anemia secondary to inadequate dietary iron intake  Comments:  on iron supplementation    3.  (ventriculoperitoneal) shunt status  Comments:  stable and unchanged.    4. OAB (overactive bladder)  Comments:  cymbalta helped but she did angel med, will avoid incontinence meds due to  shunt    5. Epistaxis  Comments:  stop nettiepot neosporin and flonase, start nasal saline spray 4-6 x a day, will start bactroban  Orders:  -     mupirocin (Bactroban Nasal) 2 % nasal ointment; into the nostril(s) as directed by provider 2 (Two) Times a Day.  Dispense: 10 g; Refill: 0    6. Primary hypertension  Comments:  increase losartan  Orders:  -     losartan (COZAAR) 50 MG tablet; Take 1 tablet by mouth Daily.  Dispense: 90 tablet; Refill: 1    7. Morbid (severe) obesity due to excess calories (HCC)  Comments:  Encouraged she continue her low-carb diet and decrease to 1500 matias a day and continue daily exercise at the gym with weightlifting.          Follow Up   No follow-ups on file.

## 2022-07-11 ENCOUNTER — TELEPHONE (OUTPATIENT)
Dept: FAMILY MEDICINE CLINIC | Age: 52
End: 2022-07-11

## 2022-07-11 NOTE — TELEPHONE ENCOUNTER
Okay lets stop the losartan and see how she does.  Happy to work her in tomorrow or Thursday if she needs.  I would like her to check her blood pressures outside the office and if above 140/90 I will increase her metoprolol to 50 mg twice a day.  Dr. Espinal

## 2022-07-11 NOTE — TELEPHONE ENCOUNTER
Pt is calling regarding increase in losartan 50 mg within one hour she is having swelling she also started a new job and was on her feet more  She can not put her shoes on swelling in feet and now swelling in her hands please advise ?

## 2022-08-06 RX ORDER — METOPROLOL TARTRATE 50 MG/1
50 TABLET, FILM COATED ORAL DAILY
Qty: 90 TABLET | Refills: 0 | Status: SHIPPED | OUTPATIENT
Start: 2022-08-06 | End: 2022-11-03

## 2022-09-01 ENCOUNTER — OFFICE VISIT (OUTPATIENT)
Dept: FAMILY MEDICINE CLINIC | Age: 52
End: 2022-09-01

## 2022-09-01 VITALS
WEIGHT: 246 LBS | HEIGHT: 63 IN | DIASTOLIC BLOOD PRESSURE: 73 MMHG | HEART RATE: 77 BPM | BODY MASS INDEX: 43.59 KG/M2 | SYSTOLIC BLOOD PRESSURE: 119 MMHG | OXYGEN SATURATION: 97 %

## 2022-09-01 DIAGNOSIS — I10 PRIMARY HYPERTENSION: ICD-10-CM

## 2022-09-01 DIAGNOSIS — Z12.11 COLON CANCER SCREENING: ICD-10-CM

## 2022-09-01 DIAGNOSIS — Z78.0 POSTMENOPAUSAL STATE: ICD-10-CM

## 2022-09-01 DIAGNOSIS — Z12.31 ENCOUNTER FOR SCREENING MAMMOGRAM FOR BREAST CANCER: ICD-10-CM

## 2022-09-01 DIAGNOSIS — Z00.00 ENCOUNTER FOR ANNUAL WELLNESS EXAM IN MEDICARE PATIENT: Primary | ICD-10-CM

## 2022-09-01 DIAGNOSIS — R26.89 POOR BALANCE: ICD-10-CM

## 2022-09-01 DIAGNOSIS — D50.8 IRON DEFICIENCY ANEMIA SECONDARY TO INADEQUATE DIETARY IRON INTAKE: ICD-10-CM

## 2022-09-01 DIAGNOSIS — Z23 ENCOUNTER FOR IMMUNIZATION: ICD-10-CM

## 2022-09-01 DIAGNOSIS — R53.83 FATIGUE, UNSPECIFIED TYPE: ICD-10-CM

## 2022-09-01 DIAGNOSIS — E78.00 HYPERCHOLESTEROLEMIA: ICD-10-CM

## 2022-09-01 DIAGNOSIS — Z98.2 VP (VENTRICULOPERITONEAL) SHUNT STATUS: ICD-10-CM

## 2022-09-01 DIAGNOSIS — E66.01 MORBID (SEVERE) OBESITY DUE TO EXCESS CALORIES: ICD-10-CM

## 2022-09-01 DIAGNOSIS — E55.9 VITAMIN D DEFICIENCY: ICD-10-CM

## 2022-09-01 DIAGNOSIS — K14.0 ABSCESS OF TONGUE: ICD-10-CM

## 2022-09-01 PROCEDURE — 90471 IMMUNIZATION ADMIN: CPT | Performed by: FAMILY MEDICINE

## 2022-09-01 PROCEDURE — G0439 PPPS, SUBSEQ VISIT: HCPCS | Performed by: FAMILY MEDICINE

## 2022-09-01 PROCEDURE — 90677 PCV20 VACCINE IM: CPT | Performed by: FAMILY MEDICINE

## 2022-09-01 PROCEDURE — 1159F MED LIST DOCD IN RCRD: CPT | Performed by: FAMILY MEDICINE

## 2022-09-01 PROCEDURE — 96160 PT-FOCUSED HLTH RISK ASSMT: CPT | Performed by: FAMILY MEDICINE

## 2022-09-01 PROCEDURE — 90715 TDAP VACCINE 7 YRS/> IM: CPT | Performed by: FAMILY MEDICINE

## 2022-09-01 PROCEDURE — G0009 ADMIN PNEUMOCOCCAL VACCINE: HCPCS | Performed by: FAMILY MEDICINE

## 2022-09-01 PROCEDURE — 99213 OFFICE O/P EST LOW 20 MIN: CPT | Performed by: FAMILY MEDICINE

## 2022-09-01 PROCEDURE — 1170F FXNL STATUS ASSESSED: CPT | Performed by: FAMILY MEDICINE

## 2022-09-01 RX ORDER — MULTIPLE VITAMINS W/ MINERALS TAB 9MG-400MCG
1 TAB ORAL DAILY
COMMUNITY

## 2022-09-01 RX ORDER — SULFAMETHOXAZOLE AND TRIMETHOPRIM 800; 160 MG/1; MG/1
1 TABLET ORAL 2 TIMES DAILY
Qty: 14 TABLET | Refills: 0 | Status: SHIPPED | OUTPATIENT
Start: 2022-09-01 | End: 2023-01-26

## 2022-09-01 NOTE — ASSESSMENT & PLAN NOTE
MEDICARE WELLNESS EXAM-SHE IS DUE FOR HER  MAMMOGRAM in 1/2022, UTD ON DEXA/COLONOSCOPY, DUE FOR PAP AND PELVIC EXAM, RECOMMEND VACCINATIONS INCLUDING:  SHINGLES VACCINE/COVID/FLU/TDAP AND PNEUMOVAX 20 VACCINE, ONGOING FALL RISK-RECOMMEND PT, H/O CVA WITH CHRONIC MILD MEMORY ISSUES, NO SIGNS/SYMPTOMS OF DEPRESSION, SHE IS ABLE TO DRIVE AND DOES NOT NEED ASSISTANCE WITH ADL'S/FINANCES, HEARING IS GOOD, RECOMMEND A LIVING WILL-LAST GIVEN, CURRENT DOCTOR LIST UPDATED.  RECOMMEND YEARLY EYE/DENTAL EXAMS AND FLU VACCINATIONS

## 2022-09-01 NOTE — PROGRESS NOTES
The ABCs of the Annual Wellness Visit  Subsequent Medicare Wellness Visit    Chief Complaint   Patient presents with   • Blister   • Cyst     Pt states she noticed a cyst under her tongue yesterday      Subjective    History of Present Illness:  Marcela Gutierrez is a 52 y.o. female who presents for a Subsequent Medicare Wellness Visit.    The following portions of the patient's history were reviewed and   updated as appropriate: allergies, current medications, past family history, past medical history, past social history, past surgical history and problem list.    Compared to one year ago, the patient feels her physical   health is the same.    Compared to one year ago, the patient feels her mental   health is better.    Recent Hospitalizations:  She was not admitted to the hospital during the last year.     Vitamin D def and she is off vitamin D supplementation     H/o hypercholesterolemia, borderline with chol 207.  She is working on diet controlled     Marcela has a history of H/o pseudotumor cerebri with  shunt that is located behind her right ear and has been present for about 15 years or more.  The shunt down the spine was broke broken off as the result of a previous fall. CT head in Nov showed right frontal ventriculostomy catheter tip is in the region of the 3rd ventricle, in unchanged position. She now has a new working  shunt in place.   She still has mild edema in R LE and she does have an appt with a foot and ankle specialist    She is now on fish oil.  Hypertension is treated with  HCTZ and metoprolol.  She tolerates meds well without side effects.  Her BP have been high at home    She has barretts esophagus and sees DR Fairchild     She has lost over 100 #s-she has plateaued at this time, she is sched for reconstructive surgery for excess skin removal with plastic surgeon.  Her stomach is now stapled.  She has h/o bulemia as a teenager.          She also has chronic knee pain due to OA, and she thinks  she has fibromyalgia. She is on collegen.  She is now doing yoga which is really helping her pain and ROM         Current Medical Providers:  Patient Care Team:  Madelin Espinal MD as PCP - General (Family Medicine)  Gilda Dunham MD as Consulting Physician (Urology)  Nelly Hills MD as Surgeon (Neurosurgery)  Altagracia Benitez DO as Consulting Physician (Obstetrics and Gynecology)  Steph Quezada MD as Consulting Physician (Plastic Surgery)    Outpatient Medications Prior to Visit   Medication Sig Dispense Refill   • albuterol sulfate  (90 Base) MCG/ACT inhaler Inhale 2 puffs 4 (Four) Times a Day As Needed for Wheezing. 18 g 0   • B Complex Vitamins (VITAMIN B COMPLEX PO) Take 1 tablet by mouth Daily.     • calcium carbonate EX (TUMS EX) 750 MG chewable tablet Chew 300 mg As Needed.     • COLLAGEN PO Take  by mouth Daily.     • hydroCHLOROthiazide (HYDRODIURIL) 25 MG tablet TAKE 1 TABLET EVERY DAY 90 tablet 0   • Magnesium 250 MG tablet magnesium 250 mg oral tablet take 1 tablet by oral route daily for 30 days   Active     • metoprolol tartrate (LOPRESSOR) 50 MG tablet Take 1 tablet by mouth Daily. 90 tablet 0   • multivitamin with minerals (MULTIVITAMIN ADULTS 50+ PO) Take 1 tablet by mouth Daily.     • Omega-3 Fatty Acids (FISH OIL PO) Take  by mouth.     • topiramate (TOPAMAX) 100 MG tablet TAKE 1 TABLET EVERY DAY 90 tablet 0   • Ascorbic Acid (Vitamin C) powder Take  by mouth 4 (Four) Times a Day.     • BIOTIN PO Take  by mouth.     • folic acid-vit B6-vit B12 (FOLGARD) 2.2-25-1 MG tablet tablet Take 1 tablet by mouth Daily.     • losartan (COZAAR) 50 MG tablet Take 1 tablet by mouth Daily. 90 tablet 1   • Vitamin D-Vitamin K (K2 Plus D3) 100-1000 MCG-UNIT tablet K2 Plus D3 1,000-100 unit-mcg oral tablet take 1 tablet by oral route daily for 30 days   Active     • fluticasone (FLONASE) 50 MCG/ACT nasal spray 1 spray into the nostril(s) as directed by provider As Needed for  Rhinitis for up to 90 days. 16 g 2   • Cholecalciferol 125 MCG (5000 UT) tablet Take 4 tablets by mouth Daily.     • mupirocin (Bactroban Nasal) 2 % nasal ointment into the nostril(s) as directed by provider 2 (Two) Times a Day. 10 g 0     No facility-administered medications prior to visit.       No opioid medication identified on active medication list. I have reviewed chart for other potential  high risk medication/s and harmful drug interactions in the elderly.          Aspirin is not on active medication list.  Aspirin use is not indicated based on review of current medical condition/s. Risk of harm outweighs potential benefits.  .    Patient Active Problem List   Diagnosis   • OAB (overactive bladder)   • High blood pressure   • Headache disorder   • Sleep apnea   • Anemia   •  (ventriculoperitoneal) shunt status   • Fall   • Right ear pain   • Encounter for annual wellness exam in Medicare patient   • Hypertrophic scar   • Excess skin of upper extremity     Advance Care Planning  Advance Directive is not on file.  ACP discussion was held with the patient during this visit. Patient does not have an advance directive, information provided.    Review of Systems   Constitutional: Positive for fatigue. Negative for chills and fever.   HENT: Negative for ear pain, sinus pressure and sore throat.    Respiratory: Positive for cough, shortness of breath and wheezing.    Cardiovascular: Positive for leg swelling. Negative for chest pain and palpitations.   Gastrointestinal: Positive for nausea. Negative for abdominal pain, blood in stool, constipation, diarrhea and vomiting.   Genitourinary: Negative for dysuria.   Skin: Negative for rash.   Neurological: Positive for dizziness.   Psychiatric/Behavioral: Negative for sleep disturbance and suicidal ideas. The patient is not nervous/anxious.         Objective    Vitals:    09/01/22 0939   BP: 119/73   BP Location: Right arm   Patient Position: Sitting   Cuff Size:  "Adult   Pulse: 77   SpO2: 97%   Weight: 112 kg (246 lb)   Height: 160 cm (63\")     BMI Readings from Last 1 Encounters:   09/01/22 43.58 kg/m²   BMI is above normal parameters. Recommendations include: exercise counseling and nutrition counseling    Does the patient have evidence of cognitive impairment? Yes    Physical Exam  Vitals and nursing note reviewed.   Constitutional:       General: She is not in acute distress.     Appearance: Normal appearance. She is obese. She is not ill-appearing, toxic-appearing or diaphoretic.   HENT:      Head: Normocephalic and atraumatic.      Right Ear: Tympanic membrane, ear canal and external ear normal.      Left Ear: Tympanic membrane, ear canal and external ear normal.      Nose: No congestion or rhinorrhea.      Mouth/Throat:      Mouth: Mucous membranes are moist.      Pharynx: Oropharynx is clear. No oropharyngeal exudate or posterior oropharyngeal erythema.   Eyes:      Extraocular Movements: Extraocular movements intact.      Conjunctiva/sclera: Conjunctivae normal.      Pupils: Pupils are equal, round, and reactive to light.   Cardiovascular:      Rate and Rhythm: Normal rate and regular rhythm.      Heart sounds: Normal heart sounds.   Pulmonary:      Effort: Pulmonary effort is normal.      Breath sounds: Normal breath sounds. No wheezing, rhonchi or rales.   Chest:   Breasts:      Right: Normal.      Left: Normal.       Abdominal:      General: Abdomen is flat.      Palpations: Abdomen is soft.   Musculoskeletal:      Cervical back: Neck supple. No rigidity.   Lymphadenopathy:      Cervical: No cervical adenopathy.   Skin:     General: Skin is warm and dry.   Neurological:      Mental Status: She is alert and oriented to person, place, and time.   Psychiatric:         Mood and Affect: Mood normal.         Behavior: Behavior normal.                 HEALTH RISK ASSESSMENT    Smoking Status:  Social History     Tobacco Use   Smoking Status Never Smoker   Smokeless " Tobacco Never Used     Alcohol Consumption:  Social History     Substance and Sexual Activity   Alcohol Use Yes    Comment: social     Fall Risk Screen:    KIKIADI Fall Risk Assessment was completed, and patient is at HIGH risk for falls. Assessment completed on:9/1/2022    Depression Screening:  PHQ-2/PHQ-9 Depression Screening 9/1/2022   Retired PHQ-9 Total Score -   Retired Total Score -   Little Interest or Pleasure in Doing Things 0-->not at all   Feeling Down, Depressed or Hopeless 0-->not at all   PHQ-9: Brief Depression Severity Measure Score 0       Health Habits and Functional and Cognitive Screening:  Functional & Cognitive Status 9/1/2022   Do you have difficulty preparing food and eating? No   Do you have difficulty bathing yourself, getting dressed or grooming yourself? No   Do you have difficulty using the toilet? No   Do you have difficulty moving around from place to place? No   Do you have trouble with steps or getting out of a bed or a chair? No   Current Diet Other        Current Diet Comment keto diet   Dental Exam Not up to date   Eye Exam Not up to date   Exercise (times per week) 4 times per week   Current Exercises Include Light Weights;Cardiovascular Workout   Do you need help using the phone?  No   Are you deaf or do you have serious difficulty hearing?  No   Do you need help with transportation? No   Do you need help shopping? No   Do you need help preparing meals?  No   Do you need help with housework?  No   Do you need help with laundry? No   Do you need help taking your medications? No   Do you need help managing money? No   Do you ever drive or ride in a car without wearing a seat belt? No   Have you felt unusual stress, anger or loneliness in the last month? No   Who do you live with? Alone   If you need help, do you have trouble finding someone available to you? No   Have you been bothered in the last four weeks by sexual problems? No   Do you have difficulty concentrating,  remembering or making decisions? No       Age-appropriate Screening Schedule:  Refer to the list below for future screening recommendations based on patient's age, sex and/or medical conditions. Orders for these recommended tests are listed in the plan section. The patient has been provided with a written plan.    Health Maintenance   Topic Date Due   • ZOSTER VACCINE (1 of 2) Never done   • INFLUENZA VACCINE  10/01/2022   • LIPID PANEL  01/13/2023   • MAMMOGRAM  01/13/2024   • TDAP/TD VACCINES (2 - Td or Tdap) 09/01/2032                The following data was reviewed by: Madelin Espinal MD on 09/01/2022:  CMP    CMP 1/13/22   Glucose 99   BUN 14   Creatinine 0.79   eGFR Non African Am 76   Sodium 140   Potassium 3.6   Chloride 103   Calcium 10.2   Albumin 4.50   Total Bilirubin 0.4   Alkaline Phosphatase 63   AST (SGOT) 18   ALT (SGPT) 18           CBC    CBC 1/13/22   WBC 6.26   RBC 4.76   Hemoglobin 14.3   Hematocrit 41.2   MCV 86.6   MCH 30.0   MCHC 34.7   RDW 12.2 (A)   Platelets 336   (A) Abnormal value            Lipid Panel    Lipid Panel 1/13/22   Total Cholesterol 207 (A)   Triglycerides 127   HDL Cholesterol 50   VLDL Cholesterol 23   LDL Cholesterol  134 (A)   LDL/HDL Ratio 2.63   (A) Abnormal value                HgB    HGB 1/13/22   Hemoglobin 14.3                        Assessment & Plan   CMS Preventative Services Quick Reference  Risk Factors Identified During Encounter  Chronic Pain   Dementia/Memory   Fall Risk-High or Moderate  Immunizations Discussed/Encouraged (specific Immunizations; Tdap, Influenza, Prevnar 20 (Pneumococcal 20-valent conjugate) and COVID19  Obesity/Overweight   Urinary Incontinence  The above risks/problems have been discussed with the patient.  Follow up actions/plans if indicated are seen below in the Assessment/Plan Section.  Pertinent information has been shared with the patient in the After Visit Summary.    Diagnoses and all orders for this visit:    1. Encounter  for annual wellness exam in Medicare patient (Primary)  Assessment & Plan:  MEDICARE WELLNESS EXAM-SHE IS DUE FOR HER  MAMMOGRAM in 1/2022, UTD ON DEXA/COLONOSCOPY, DUE FOR PAP AND PELVIC EXAM, RECOMMEND VACCINATIONS INCLUDING:  SHINGLES VACCINE/COVID/FLU/TDAP AND PNEUMOVAX 20 VACCINE, ONGOING FALL RISK-RECOMMEND PT, H/O CVA WITH CHRONIC MILD MEMORY ISSUES, NO SIGNS/SYMPTOMS OF DEPRESSION, SHE IS ABLE TO DRIVE AND DOES NOT NEED ASSISTANCE WITH ADL'S/FINANCES, HEARING IS GOOD, RECOMMEND A LIVING WILL-LAST GIVEN, CURRENT DOCTOR LIST UPDATED.  RECOMMEND YEARLY EYE/DENTAL EXAMS AND FLU VACCINATIONS      2.  (ventriculoperitoneal) shunt status    3. Iron deficiency anemia secondary to inadequate dietary iron intake  -     Iron Profile; Future    4. Primary hypertension  Comments:  stable and very well controlled  Orders:  -     Comprehensive Metabolic Panel; Future  -     CBC (No Diff); Future    5. Poor balance  Comments:  recommend PT and she defers at this time    6. Encounter for immunization  -     Tdap Vaccine Greater Than or Equal To 6yo IM  -     Pneumococcal Conjugate Vaccine 20-Valent (PCV20)    7. Hypercholesterolemia  -     Lipid Panel; Future    8. Morbid (severe) obesity due to excess calories (HCC)  Comments:  cont diet and exercise and she defers dietician referral at this time  Orders:  -     TSH+Free T4; Future    9. Fatigue, unspecified type  Comments:  WILL CHECK HER THYROID  Orders:  -     TSH+Free T4; Future    10. Encounter for screening mammogram for breast cancer  -     Mammo Screening Digital Tomosynthesis Bilateral With CAD; Future    11. Colon cancer screening    12. Postmenopausal state    13. Vitamin D deficiency  -     Vitamin D 25 hydroxy; Future    14. Abscess of tongue  Comments:  We will treat with Bactrim DS antibiotic and she can use heat to the tongue and gentle massage and I will set her up for an ENT eval  Orders:  -     sulfamethoxazole-trimethoprim (Bactrim DS) 800-160 MG per  tablet; Take 1 tablet by mouth 2 (Two) Times a Day.  Dispense: 14 tablet; Refill: 0  -     Ambulatory Referral to ENT (Otolaryngology)      Follow Up:   Return in about 6 months (around 3/1/2023) for Recheck.     An After Visit Summary and PPPS were made available to the patient.

## 2022-09-20 ENCOUNTER — TELEPHONE (OUTPATIENT)
Dept: FAMILY MEDICINE CLINIC | Age: 52
End: 2022-09-20

## 2022-09-20 NOTE — TELEPHONE ENCOUNTER
Caller: Marcela Gutierrez    Relationship: Self    Best call back number: [phone]    What is the best time to reach you: ANYTIME    Who are you requesting to speak with (clinical staff, provider,  specific staff member):CLINICAL    Do you know the name of the person who called: LIONEL    What was the call regarding: NOT SURE    Do you require a callback:YES

## 2022-10-13 ENCOUNTER — LAB (OUTPATIENT)
Dept: LAB | Facility: HOSPITAL | Age: 52
End: 2022-10-13

## 2022-10-13 DIAGNOSIS — R53.83 FATIGUE, UNSPECIFIED TYPE: ICD-10-CM

## 2022-10-13 DIAGNOSIS — E78.00 HYPERCHOLESTEROLEMIA: ICD-10-CM

## 2022-10-13 DIAGNOSIS — E55.9 VITAMIN D DEFICIENCY: ICD-10-CM

## 2022-10-13 DIAGNOSIS — E66.01 MORBID (SEVERE) OBESITY DUE TO EXCESS CALORIES: ICD-10-CM

## 2022-10-13 DIAGNOSIS — I10 PRIMARY HYPERTENSION: ICD-10-CM

## 2022-10-13 DIAGNOSIS — D50.8 IRON DEFICIENCY ANEMIA SECONDARY TO INADEQUATE DIETARY IRON INTAKE: ICD-10-CM

## 2022-10-13 LAB
DEPRECATED RDW RBC AUTO: 39.4 FL (ref 37–54)
ERYTHROCYTE [DISTWIDTH] IN BLOOD BY AUTOMATED COUNT: 12.8 % (ref 12.3–15.4)
HCT VFR BLD AUTO: 40 % (ref 34–46.6)
HGB BLD-MCNC: 13.5 G/DL (ref 12–15.9)
MCH RBC QN AUTO: 29.1 PG (ref 26.6–33)
MCHC RBC AUTO-ENTMCNC: 33.8 G/DL (ref 31.5–35.7)
MCV RBC AUTO: 86.2 FL (ref 79–97)
PLATELET # BLD AUTO: 365 10*3/MM3 (ref 140–450)
PMV BLD AUTO: 9.7 FL (ref 6–12)
RBC # BLD AUTO: 4.64 10*6/MM3 (ref 3.77–5.28)
WBC NRBC COR # BLD: 7.63 10*3/MM3 (ref 3.4–10.8)

## 2022-10-13 PROCEDURE — 80053 COMPREHEN METABOLIC PANEL: CPT

## 2022-10-13 PROCEDURE — 84443 ASSAY THYROID STIM HORMONE: CPT

## 2022-10-13 PROCEDURE — 82306 VITAMIN D 25 HYDROXY: CPT

## 2022-10-13 PROCEDURE — 85027 COMPLETE CBC AUTOMATED: CPT

## 2022-10-13 PROCEDURE — 84466 ASSAY OF TRANSFERRIN: CPT

## 2022-10-13 PROCEDURE — 83540 ASSAY OF IRON: CPT

## 2022-10-13 PROCEDURE — 36415 COLL VENOUS BLD VENIPUNCTURE: CPT

## 2022-10-13 PROCEDURE — 84439 ASSAY OF FREE THYROXINE: CPT

## 2022-10-13 PROCEDURE — 80061 LIPID PANEL: CPT

## 2022-10-13 RX ORDER — TOPIRAMATE 100 MG/1
TABLET, FILM COATED ORAL
Qty: 90 TABLET | Refills: 0 | Status: SHIPPED | OUTPATIENT
Start: 2022-10-13 | End: 2023-03-08

## 2022-10-14 LAB
25(OH)D3 SERPL-MCNC: 51.8 NG/ML (ref 30–100)
ALBUMIN SERPL-MCNC: 4.7 G/DL (ref 3.5–5.2)
ALBUMIN/GLOB SERPL: 1.4 G/DL
ALP SERPL-CCNC: 64 U/L (ref 39–117)
ALT SERPL W P-5'-P-CCNC: 25 U/L (ref 1–33)
ANION GAP SERPL CALCULATED.3IONS-SCNC: 11 MMOL/L (ref 5–15)
AST SERPL-CCNC: 31 U/L (ref 1–32)
BILIRUB SERPL-MCNC: 0.6 MG/DL (ref 0–1.2)
BUN SERPL-MCNC: 8 MG/DL (ref 6–20)
BUN/CREAT SERPL: 10.8 (ref 7–25)
CALCIUM SPEC-SCNC: 10.3 MG/DL (ref 8.6–10.5)
CHLORIDE SERPL-SCNC: 101 MMOL/L (ref 98–107)
CHOLEST SERPL-MCNC: 210 MG/DL (ref 0–200)
CO2 SERPL-SCNC: 28 MMOL/L (ref 22–29)
CREAT SERPL-MCNC: 0.74 MG/DL (ref 0.57–1)
EGFRCR SERPLBLD CKD-EPI 2021: 96.3 ML/MIN/1.73
GLOBULIN UR ELPH-MCNC: 3.4 GM/DL
GLUCOSE SERPL-MCNC: 81 MG/DL (ref 65–99)
HDLC SERPL-MCNC: 50 MG/DL (ref 40–60)
IRON 24H UR-MRATE: 117 MCG/DL (ref 37–145)
IRON SATN MFR SERPL: 29 % (ref 20–50)
LDLC SERPL CALC-MCNC: 137 MG/DL (ref 0–100)
LDLC/HDLC SERPL: 2.69 {RATIO}
POTASSIUM SERPL-SCNC: 3.8 MMOL/L (ref 3.5–5.2)
PROT SERPL-MCNC: 8.1 G/DL (ref 6–8.5)
SODIUM SERPL-SCNC: 140 MMOL/L (ref 136–145)
T4 FREE SERPL-MCNC: 1.08 NG/DL (ref 0.93–1.7)
TIBC SERPL-MCNC: 405 MCG/DL (ref 298–536)
TRANSFERRIN SERPL-MCNC: 272 MG/DL (ref 200–360)
TRIGL SERPL-MCNC: 128 MG/DL (ref 0–150)
TSH SERPL DL<=0.05 MIU/L-ACNC: 0.65 UIU/ML (ref 0.27–4.2)
VLDLC SERPL-MCNC: 23 MG/DL (ref 5–40)

## 2022-11-03 RX ORDER — METOPROLOL TARTRATE 50 MG/1
TABLET, FILM COATED ORAL
Qty: 90 TABLET | Refills: 0 | Status: SHIPPED | OUTPATIENT
Start: 2022-11-03 | End: 2023-03-29

## 2022-11-17 ENCOUNTER — OFFICE VISIT (OUTPATIENT)
Dept: OTOLARYNGOLOGY | Facility: CLINIC | Age: 52
End: 2022-11-17

## 2022-11-17 VITALS — TEMPERATURE: 97.4 F | BODY MASS INDEX: 43.91 KG/M2 | HEIGHT: 63 IN | WEIGHT: 247.8 LBS

## 2022-11-17 DIAGNOSIS — R04.0 NASAL BLEEDING: ICD-10-CM

## 2022-11-17 DIAGNOSIS — J34.89 NASAL SORE: Primary | ICD-10-CM

## 2022-11-17 DIAGNOSIS — K14.0 ABSCESS OF TONGUE: ICD-10-CM

## 2022-11-17 PROCEDURE — 99203 OFFICE O/P NEW LOW 30 MIN: CPT | Performed by: OTOLARYNGOLOGY

## 2022-11-17 RX ORDER — DICLOFENAC SODIUM 75 MG/1
TABLET, DELAYED RELEASE ORAL
COMMUNITY
Start: 2022-09-10 | End: 2023-01-26
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
## 2023-01-20 ENCOUNTER — HOSPITAL ENCOUNTER (OUTPATIENT)
Dept: MAMMOGRAPHY | Facility: HOSPITAL | Age: 53
Discharge: HOME | End: 2023-01-20
Admitting: FAMILY MEDICINE
Payer: MEDICARE

## 2023-01-20 DIAGNOSIS — Z12.31 ENCOUNTER FOR SCREENING MAMMOGRAM FOR BREAST CANCER: ICD-10-CM

## 2023-01-20 PROCEDURE — 77067 SCR MAMMO BI INCL CAD: CPT

## 2023-01-20 PROCEDURE — 77063 BREAST TOMOSYNTHESIS BI: CPT

## 2023-01-25 ENCOUNTER — HOSPITAL ENCOUNTER (INPATIENT)
Facility: HOSPITAL | Age: 53
LOS: 2 days | Discharge: HOME | DRG: 393 | End: 2023-01-28
Attending: EMERGENCY MEDICINE | Admitting: FAMILY MEDICINE
Payer: MEDICARE

## 2023-01-25 ENCOUNTER — TELEPHONE (OUTPATIENT)
Dept: FAMILY MEDICINE CLINIC | Age: 53
End: 2023-01-25
Payer: MEDICARE

## 2023-01-25 DIAGNOSIS — R09.02 HYPOXIA: ICD-10-CM

## 2023-01-25 DIAGNOSIS — R11.2 INTRACTABLE VOMITING WITH NAUSEA: ICD-10-CM

## 2023-01-25 DIAGNOSIS — J69.0 ASPIRATION PNEUMONIA OF BOTH LUNGS DUE TO GASTRIC SECRETIONS, UNSPECIFIED PART OF LUNG: ICD-10-CM

## 2023-01-25 DIAGNOSIS — K29.00 ACUTE GASTRITIS WITHOUT HEMORRHAGE, UNSPECIFIED GASTRITIS TYPE: Primary | ICD-10-CM

## 2023-01-25 DIAGNOSIS — K92.9 GASTRIC ANASTOMOTIC STRICTURE: ICD-10-CM

## 2023-01-25 DIAGNOSIS — K22.2 ESOPHAGEAL STRICTURE: ICD-10-CM

## 2023-01-25 DIAGNOSIS — K31.89 GASTRIC ANASTOMOTIC STRICTURE: ICD-10-CM

## 2023-01-25 DIAGNOSIS — J45.909 ASTHMA, UNSPECIFIED ASTHMA SEVERITY, UNSPECIFIED WHETHER COMPLICATED, UNSPECIFIED WHETHER PERSISTENT: ICD-10-CM

## 2023-01-25 LAB
ABO GROUP BLD: NORMAL
ABO GROUP BLD: NORMAL
ALBUMIN SERPL-MCNC: 4.6 G/DL (ref 3.5–5.2)
ALBUMIN/GLOB SERPL: 1.2 G/DL
ALP SERPL-CCNC: 70 U/L (ref 39–117)
ALT SERPL W P-5'-P-CCNC: 22 U/L (ref 1–33)
ANION GAP SERPL CALCULATED.3IONS-SCNC: 11.6 MMOL/L (ref 5–15)
AST SERPL-CCNC: 26 U/L (ref 1–32)
BASOPHILS # BLD AUTO: 0.09 10*3/MM3 (ref 0–0.2)
BASOPHILS NFR BLD AUTO: 0.5 % (ref 0–1.5)
BILIRUB SERPL-MCNC: 1 MG/DL (ref 0–1.2)
BLD GP AB SCN SERPL QL: NEGATIVE
BUN SERPL-MCNC: 14 MG/DL (ref 6–20)
BUN/CREAT SERPL: 17.7 (ref 7–25)
CALCIUM SPEC-SCNC: 10.1 MG/DL (ref 8.6–10.5)
CHLORIDE SERPL-SCNC: 99 MMOL/L (ref 98–107)
CO2 SERPL-SCNC: 27.4 MMOL/L (ref 22–29)
CREAT SERPL-MCNC: 0.79 MG/DL (ref 0.57–1)
D-LACTATE SERPL-SCNC: 1 MMOL/L (ref 0.5–2)
DEPRECATED RDW RBC AUTO: 38.4 FL (ref 37–54)
EGFRCR SERPLBLD CKD-EPI 2021: 89 ML/MIN/1.73
EOSINOPHIL # BLD AUTO: 0.08 10*3/MM3 (ref 0–0.4)
EOSINOPHIL NFR BLD AUTO: 0.4 % (ref 0.3–6.2)
ERYTHROCYTE [DISTWIDTH] IN BLOOD BY AUTOMATED COUNT: 12.5 % (ref 12.3–15.4)
GLOBULIN UR ELPH-MCNC: 3.8 GM/DL
GLUCOSE SERPL-MCNC: 116 MG/DL (ref 65–99)
HCT VFR BLD AUTO: 39.1 % (ref 34–46.6)
HEMOCCULT STL QL IA: NEGATIVE
HGB BLD-MCNC: 13.7 G/DL (ref 12–15.9)
HOLD SPECIMEN: NORMAL
HOLD SPECIMEN: NORMAL
IMM GRANULOCYTES # BLD AUTO: 0.07 10*3/MM3 (ref 0–0.05)
IMM GRANULOCYTES NFR BLD AUTO: 0.4 % (ref 0–0.5)
LYMPHOCYTES # BLD AUTO: 2.87 10*3/MM3 (ref 0.7–3.1)
LYMPHOCYTES NFR BLD AUTO: 15 % (ref 19.6–45.3)
MCH RBC QN AUTO: 29.7 PG (ref 26.6–33)
MCHC RBC AUTO-ENTMCNC: 35 G/DL (ref 31.5–35.7)
MCV RBC AUTO: 84.8 FL (ref 79–97)
MONOCYTES # BLD AUTO: 0.79 10*3/MM3 (ref 0.1–0.9)
MONOCYTES NFR BLD AUTO: 4.1 % (ref 5–12)
NEUTROPHILS NFR BLD AUTO: 15.25 10*3/MM3 (ref 1.7–7)
NEUTROPHILS NFR BLD AUTO: 79.6 % (ref 42.7–76)
NRBC BLD AUTO-RTO: 0 /100 WBC (ref 0–0.2)
PLATELET # BLD AUTO: 337 10*3/MM3 (ref 140–450)
PMV BLD AUTO: 8.5 FL (ref 6–12)
POTASSIUM SERPL-SCNC: 3.6 MMOL/L (ref 3.5–5.2)
PROT SERPL-MCNC: 8.4 G/DL (ref 6–8.5)
RBC # BLD AUTO: 4.61 10*6/MM3 (ref 3.77–5.28)
RH BLD: POSITIVE
RH BLD: POSITIVE
SODIUM SERPL-SCNC: 138 MMOL/L (ref 136–145)
T&S EXPIRATION DATE: NORMAL
WBC NRBC COR # BLD: 19.15 10*3/MM3 (ref 3.4–10.8)
WHOLE BLOOD HOLD COAG: NORMAL
WHOLE BLOOD HOLD SPECIMEN: NORMAL

## 2023-01-25 PROCEDURE — 82274 ASSAY TEST FOR BLOOD FECAL: CPT | Performed by: EMERGENCY MEDICINE

## 2023-01-25 PROCEDURE — 0DB78ZX EXCISION OF STOMACH, PYLORUS, VIA NATURAL OR ARTIFICIAL OPENING ENDOSCOPIC, DIAGNOSTIC: ICD-10-PCS | Performed by: INTERNAL MEDICINE

## 2023-01-25 PROCEDURE — 86900 BLOOD TYPING SEROLOGIC ABO: CPT

## 2023-01-25 PROCEDURE — 99285 EMERGENCY DEPT VISIT HI MDM: CPT

## 2023-01-25 PROCEDURE — 0DB68ZX EXCISION OF STOMACH, VIA NATURAL OR ARTIFICIAL OPENING ENDOSCOPIC, DIAGNOSTIC: ICD-10-PCS | Performed by: INTERNAL MEDICINE

## 2023-01-25 PROCEDURE — 86850 RBC ANTIBODY SCREEN: CPT

## 2023-01-25 PROCEDURE — 85025 COMPLETE CBC W/AUTO DIFF WBC: CPT

## 2023-01-25 PROCEDURE — 86901 BLOOD TYPING SEROLOGIC RH(D): CPT

## 2023-01-25 PROCEDURE — 80053 COMPREHEN METABOLIC PANEL: CPT

## 2023-01-25 PROCEDURE — 36415 COLL VENOUS BLD VENIPUNCTURE: CPT

## 2023-01-25 PROCEDURE — 0DC68ZZ EXTIRPATION OF MATTER FROM STOMACH, VIA NATURAL OR ARTIFICIAL OPENING ENDOSCOPIC: ICD-10-PCS | Performed by: INTERNAL MEDICINE

## 2023-01-25 PROCEDURE — 83605 ASSAY OF LACTIC ACID: CPT

## 2023-01-25 PROCEDURE — 25010000002 ONDANSETRON PER 1 MG: Performed by: EMERGENCY MEDICINE

## 2023-01-25 RX ORDER — SODIUM CHLORIDE 0.9 % (FLUSH) 0.9 %
10 SYRINGE (ML) INJECTION AS NEEDED
Status: DISCONTINUED | OUTPATIENT
Start: 2023-01-25 | End: 2023-01-28 | Stop reason: HOSPADM

## 2023-01-25 RX ORDER — ONDANSETRON 2 MG/ML
4 INJECTION INTRAMUSCULAR; INTRAVENOUS ONCE
Status: COMPLETED | OUTPATIENT
Start: 2023-01-25 | End: 2023-01-25

## 2023-01-25 RX ADMIN — ONDANSETRON 4 MG: 2 INJECTION INTRAMUSCULAR; INTRAVENOUS at 19:55

## 2023-01-25 NOTE — TELEPHONE ENCOUNTER
Pt called and said that her vomit is black and she has not been able to eat or drink for 2 days I advised that she needs to go to the emergency room right now for eval and treatment .

## 2023-01-26 ENCOUNTER — ANESTHESIA EVENT (OUTPATIENT)
Dept: GASTROENTEROLOGY | Facility: HOSPITAL | Age: 53
DRG: 393 | End: 2023-01-26
Payer: MEDICARE

## 2023-01-26 ENCOUNTER — APPOINTMENT (OUTPATIENT)
Dept: CT IMAGING | Facility: HOSPITAL | Age: 53
DRG: 393 | End: 2023-01-26
Payer: MEDICARE

## 2023-01-26 ENCOUNTER — APPOINTMENT (OUTPATIENT)
Dept: GENERAL RADIOLOGY | Facility: HOSPITAL | Age: 53
DRG: 393 | End: 2023-01-26
Payer: MEDICARE

## 2023-01-26 ENCOUNTER — ANESTHESIA (OUTPATIENT)
Dept: GASTROENTEROLOGY | Facility: HOSPITAL | Age: 53
DRG: 393 | End: 2023-01-26
Payer: MEDICARE

## 2023-01-26 ENCOUNTER — PREP FOR SURGERY (OUTPATIENT)
Dept: OTHER | Facility: HOSPITAL | Age: 53
End: 2023-01-26
Payer: MEDICARE

## 2023-01-26 DIAGNOSIS — K22.2 ESOPHAGEAL STRICTURE: Primary | ICD-10-CM

## 2023-01-26 PROBLEM — K31.89: Status: ACTIVE | Noted: 2023-01-26

## 2023-01-26 LAB
BACTERIA UR QL AUTO: ABNORMAL /HPF
BILIRUB UR QL STRIP: NEGATIVE
CLARITY UR: CLEAR
COLOR UR: ABNORMAL
GLUCOSE UR STRIP-MCNC: NEGATIVE MG/DL
HGB UR QL STRIP.AUTO: NEGATIVE
HYALINE CASTS UR QL AUTO: ABNORMAL /LPF
KETONES UR QL STRIP: ABNORMAL
LEUKOCYTE ESTERASE UR QL STRIP.AUTO: ABNORMAL
NITRITE UR QL STRIP: NEGATIVE
PH UR STRIP.AUTO: 5.5 [PH] (ref 5–8)
PROT UR QL STRIP: NEGATIVE
RBC # UR STRIP: ABNORMAL /HPF
REF LAB TEST METHOD: ABNORMAL
SP GR UR STRIP: 1.02 (ref 1–1.03)
SQUAMOUS #/AREA URNS HPF: ABNORMAL /HPF
UROBILINOGEN UR QL STRIP: ABNORMAL
WBC # UR STRIP: ABNORMAL /HPF

## 2023-01-26 PROCEDURE — 81001 URINALYSIS AUTO W/SCOPE: CPT | Performed by: EMERGENCY MEDICINE

## 2023-01-26 PROCEDURE — 94799 UNLISTED PULMONARY SVC/PX: CPT

## 2023-01-26 PROCEDURE — 25010000002 PIPERACILLIN SOD-TAZOBACTAM PER 1 G: Performed by: FAMILY MEDICINE

## 2023-01-26 PROCEDURE — 25010000002 METOCLOPRAMIDE PER 10 MG: Performed by: EMERGENCY MEDICINE

## 2023-01-26 PROCEDURE — 94640 AIRWAY INHALATION TREATMENT: CPT

## 2023-01-26 PROCEDURE — 99223 1ST HOSP IP/OBS HIGH 75: CPT | Performed by: FAMILY MEDICINE

## 2023-01-26 PROCEDURE — 87040 BLOOD CULTURE FOR BACTERIA: CPT | Performed by: EMERGENCY MEDICINE

## 2023-01-26 PROCEDURE — 25010000002 PROPOFOL 10 MG/ML EMULSION: Performed by: NURSE ANESTHETIST, CERTIFIED REGISTERED

## 2023-01-26 PROCEDURE — 43245 EGD DILATE STRICTURE: CPT | Performed by: INTERNAL MEDICINE

## 2023-01-26 PROCEDURE — 94760 N-INVAS EAR/PLS OXIMETRY 1: CPT

## 2023-01-26 PROCEDURE — 88305 TISSUE EXAM BY PATHOLOGIST: CPT | Performed by: INTERNAL MEDICINE

## 2023-01-26 PROCEDURE — 43239 EGD BIOPSY SINGLE/MULTIPLE: CPT | Performed by: INTERNAL MEDICINE

## 2023-01-26 PROCEDURE — 0 IOPAMIDOL PER 1 ML: Performed by: EMERGENCY MEDICINE

## 2023-01-26 PROCEDURE — 99222 1ST HOSP IP/OBS MODERATE 55: CPT | Performed by: INTERNAL MEDICINE

## 2023-01-26 PROCEDURE — 25010000002 PIPERACILLIN SOD-TAZOBACTAM PER 1 G: Performed by: NURSE PRACTITIONER

## 2023-01-26 PROCEDURE — 71045 X-RAY EXAM CHEST 1 VIEW: CPT

## 2023-01-26 PROCEDURE — 25010000002 VANCOMYCIN 5 G RECONSTITUTED SOLUTION: Performed by: NURSE PRACTITIONER

## 2023-01-26 PROCEDURE — 74177 CT ABD & PELVIS W/CONTRAST: CPT

## 2023-01-26 PROCEDURE — 25010000002 ONDANSETRON PER 1 MG: Performed by: EMERGENCY MEDICINE

## 2023-01-26 PROCEDURE — 43247 EGD REMOVE FOREIGN BODY: CPT | Performed by: INTERNAL MEDICINE

## 2023-01-26 PROCEDURE — C1726 CATH, BAL DIL, NON-VASCULAR: HCPCS | Performed by: INTERNAL MEDICINE

## 2023-01-26 RX ORDER — ESOMEPRAZOLE MAGNESIUM 40 MG/1
40 CAPSULE, DELAYED RELEASE ORAL
Qty: 20 CAPSULE | Refills: 0 | Status: SHIPPED | OUTPATIENT
Start: 2023-01-26 | End: 2023-02-07 | Stop reason: CLARIF

## 2023-01-26 RX ORDER — SODIUM CHLORIDE 9 MG/ML
40 INJECTION, SOLUTION INTRAVENOUS AS NEEDED
Status: DISCONTINUED | OUTPATIENT
Start: 2023-01-26 | End: 2023-01-28 | Stop reason: HOSPADM

## 2023-01-26 RX ORDER — FAMOTIDINE 10 MG/ML
20 INJECTION, SOLUTION INTRAVENOUS ONCE
Status: COMPLETED | OUTPATIENT
Start: 2023-01-26 | End: 2023-01-26

## 2023-01-26 RX ORDER — SODIUM CHLORIDE 0.9 % (FLUSH) 0.9 %
10 SYRINGE (ML) INJECTION EVERY 12 HOURS SCHEDULED
Status: DISCONTINUED | OUTPATIENT
Start: 2023-01-26 | End: 2023-01-28 | Stop reason: HOSPADM

## 2023-01-26 RX ORDER — PROPOFOL 10 MG/ML
VIAL (ML) INTRAVENOUS AS NEEDED
Status: DISCONTINUED | OUTPATIENT
Start: 2023-01-26 | End: 2023-01-26 | Stop reason: SURG

## 2023-01-26 RX ORDER — ALUMINA, MAGNESIA, AND SIMETHICONE 2400; 2400; 240 MG/30ML; MG/30ML; MG/30ML
15 SUSPENSION ORAL EVERY 6 HOURS PRN
Status: DISCONTINUED | OUTPATIENT
Start: 2023-01-26 | End: 2023-01-28 | Stop reason: HOSPADM

## 2023-01-26 RX ORDER — LIDOCAINE HYDROCHLORIDE 20 MG/ML
INJECTION, SOLUTION EPIDURAL; INFILTRATION; INTRACAUDAL; PERINEURAL AS NEEDED
Status: DISCONTINUED | OUTPATIENT
Start: 2023-01-26 | End: 2023-01-26 | Stop reason: SURG

## 2023-01-26 RX ORDER — ALBUTEROL SULFATE 2.5 MG/3ML
2.5 SOLUTION RESPIRATORY (INHALATION) EVERY 6 HOURS PRN
Status: DISCONTINUED | OUTPATIENT
Start: 2023-01-26 | End: 2023-01-27

## 2023-01-26 RX ORDER — TOPIRAMATE 100 MG/1
100 TABLET, FILM COATED ORAL DAILY
Status: DISCONTINUED | OUTPATIENT
Start: 2023-01-26 | End: 2023-01-28 | Stop reason: HOSPADM

## 2023-01-26 RX ORDER — PANTOPRAZOLE SODIUM 40 MG/1
40 TABLET, DELAYED RELEASE ORAL
Status: DISCONTINUED | OUTPATIENT
Start: 2023-01-27 | End: 2023-01-28 | Stop reason: HOSPADM

## 2023-01-26 RX ORDER — SODIUM CHLORIDE, SODIUM LACTATE, POTASSIUM CHLORIDE, CALCIUM CHLORIDE 600; 310; 30; 20 MG/100ML; MG/100ML; MG/100ML; MG/100ML
100 INJECTION, SOLUTION INTRAVENOUS CONTINUOUS
Status: DISCONTINUED | OUTPATIENT
Start: 2023-01-26 | End: 2023-01-26

## 2023-01-26 RX ORDER — ONDANSETRON 2 MG/ML
4 INJECTION INTRAMUSCULAR; INTRAVENOUS ONCE
Status: COMPLETED | OUTPATIENT
Start: 2023-01-26 | End: 2023-01-26

## 2023-01-26 RX ORDER — DIPHENHYDRAMINE HYDROCHLORIDE 50 MG/ML
25 INJECTION INTRAMUSCULAR; INTRAVENOUS ONCE
Status: DISCONTINUED | OUTPATIENT
Start: 2023-01-26 | End: 2023-01-27

## 2023-01-26 RX ORDER — METOCLOPRAMIDE HYDROCHLORIDE 5 MG/ML
10 INJECTION INTRAMUSCULAR; INTRAVENOUS ONCE
Status: COMPLETED | OUTPATIENT
Start: 2023-01-26 | End: 2023-01-26

## 2023-01-26 RX ORDER — SODIUM CHLORIDE 0.9 % (FLUSH) 0.9 %
10 SYRINGE (ML) INJECTION AS NEEDED
Status: DISCONTINUED | OUTPATIENT
Start: 2023-01-26 | End: 2023-01-28 | Stop reason: HOSPADM

## 2023-01-26 RX ORDER — PANTOPRAZOLE SODIUM 40 MG/10ML
80 INJECTION, POWDER, LYOPHILIZED, FOR SOLUTION INTRAVENOUS ONCE
Status: COMPLETED | OUTPATIENT
Start: 2023-01-26 | End: 2023-01-26

## 2023-01-26 RX ORDER — ONDANSETRON 2 MG/ML
4 INJECTION INTRAMUSCULAR; INTRAVENOUS EVERY 6 HOURS PRN
Status: DISCONTINUED | OUTPATIENT
Start: 2023-01-26 | End: 2023-01-28 | Stop reason: HOSPADM

## 2023-01-26 RX ORDER — METOPROLOL TARTRATE 50 MG/1
50 TABLET, FILM COATED ORAL DAILY
Status: DISCONTINUED | OUTPATIENT
Start: 2023-01-26 | End: 2023-01-28 | Stop reason: HOSPADM

## 2023-01-26 RX ORDER — SODIUM CHLORIDE, SODIUM LACTATE, POTASSIUM CHLORIDE, CALCIUM CHLORIDE 600; 310; 30; 20 MG/100ML; MG/100ML; MG/100ML; MG/100ML
30 INJECTION, SOLUTION INTRAVENOUS CONTINUOUS
Status: DISCONTINUED | OUTPATIENT
Start: 2023-01-26 | End: 2023-01-27

## 2023-01-26 RX ORDER — ONDANSETRON 4 MG/1
4 TABLET, ORALLY DISINTEGRATING ORAL EVERY 8 HOURS PRN
Qty: 12 TABLET | Refills: 0 | Status: SHIPPED | OUTPATIENT
Start: 2023-01-26

## 2023-01-26 RX ADMIN — PROPOFOL 10 MG: 10 INJECTION, EMULSION INTRAVENOUS at 11:36

## 2023-01-26 RX ADMIN — LIDOCAINE HYDROCHLORIDE 100 MG: 20 INJECTION, SOLUTION EPIDURAL; INFILTRATION; INTRACAUDAL; PERINEURAL at 11:27

## 2023-01-26 RX ADMIN — PROPOFOL 10 MG: 10 INJECTION, EMULSION INTRAVENOUS at 11:40

## 2023-01-26 RX ADMIN — IOPAMIDOL 100 ML: 755 INJECTION, SOLUTION INTRAVENOUS at 02:51

## 2023-01-26 RX ADMIN — SODIUM CHLORIDE 1000 ML: 9 INJECTION, SOLUTION INTRAVENOUS at 01:36

## 2023-01-26 RX ADMIN — PROPOFOL 20 MG: 10 INJECTION, EMULSION INTRAVENOUS at 11:30

## 2023-01-26 RX ADMIN — FAMOTIDINE 20 MG: 10 INJECTION INTRAVENOUS at 11:07

## 2023-01-26 RX ADMIN — ALBUTEROL SULFATE 2.5 MG: 2.5 SOLUTION RESPIRATORY (INHALATION) at 14:35

## 2023-01-26 RX ADMIN — VANCOMYCIN HYDROCHLORIDE 2250 MG: 5 INJECTION, POWDER, LYOPHILIZED, FOR SOLUTION INTRAVENOUS at 05:21

## 2023-01-26 RX ADMIN — PROPOFOL 20 MG: 10 INJECTION, EMULSION INTRAVENOUS at 11:34

## 2023-01-26 RX ADMIN — PIPERACILLIN SODIUM AND TAZOBACTAM SODIUM 4.5 G: 4; .5 INJECTION, POWDER, LYOPHILIZED, FOR SOLUTION INTRAVENOUS at 21:07

## 2023-01-26 RX ADMIN — SODIUM CHLORIDE, POTASSIUM CHLORIDE, SODIUM LACTATE AND CALCIUM CHLORIDE 100 ML/HR: 600; 310; 30; 20 INJECTION, SOLUTION INTRAVENOUS at 13:13

## 2023-01-26 RX ADMIN — PROPOFOL 10 MG: 10 INJECTION, EMULSION INTRAVENOUS at 11:42

## 2023-01-26 RX ADMIN — PROPOFOL 100 MG: 10 INJECTION, EMULSION INTRAVENOUS at 11:27

## 2023-01-26 RX ADMIN — ONDANSETRON 4 MG: 2 INJECTION INTRAMUSCULAR; INTRAVENOUS at 01:36

## 2023-01-26 RX ADMIN — SODIUM CHLORIDE, POTASSIUM CHLORIDE, SODIUM LACTATE AND CALCIUM CHLORIDE 30 ML/HR: 600; 310; 30; 20 INJECTION, SOLUTION INTRAVENOUS at 12:40

## 2023-01-26 RX ADMIN — TOPIRAMATE 100 MG: 100 TABLET, FILM COATED ORAL at 14:45

## 2023-01-26 RX ADMIN — TAZOBACTAM SODIUM AND PIPERACILLIN SODIUM 3.38 G: 375; 3 INJECTION, SOLUTION INTRAVENOUS at 05:21

## 2023-01-26 RX ADMIN — ALBUTEROL SULFATE 2.5 MG: 2.5 SOLUTION RESPIRATORY (INHALATION) at 18:55

## 2023-01-26 RX ADMIN — SODIUM CHLORIDE, POTASSIUM CHLORIDE, SODIUM LACTATE AND CALCIUM CHLORIDE 30 ML/HR: 600; 310; 30; 20 INJECTION, SOLUTION INTRAVENOUS at 11:07

## 2023-01-26 RX ADMIN — METOCLOPRAMIDE HYDROCHLORIDE 10 MG: 5 INJECTION INTRAMUSCULAR; INTRAVENOUS at 03:18

## 2023-01-26 RX ADMIN — Medication 10 ML: at 21:07

## 2023-01-26 RX ADMIN — PIPERACILLIN SODIUM AND TAZOBACTAM SODIUM 4.5 G: 4; .5 INJECTION, POWDER, LYOPHILIZED, FOR SOLUTION INTRAVENOUS at 14:45

## 2023-01-26 RX ADMIN — PROPOFOL 20 MG: 10 INJECTION, EMULSION INTRAVENOUS at 11:32

## 2023-01-26 RX ADMIN — PROPOFOL 10 MG: 10 INJECTION, EMULSION INTRAVENOUS at 11:38

## 2023-01-26 RX ADMIN — METOPROLOL TARTRATE 50 MG: 50 TABLET, FILM COATED ORAL at 14:45

## 2023-01-26 RX ADMIN — PANTOPRAZOLE SODIUM 80 MG: 40 INJECTION, POWDER, FOR SOLUTION INTRAVENOUS at 01:36

## 2023-01-26 RX ADMIN — Medication 10 ML: at 14:47

## 2023-01-26 NOTE — ANESTHESIA PREPROCEDURE EVALUATION
Anesthesia Evaluation     Patient summary reviewed and Nursing notes reviewed   no history of anesthetic complications:  NPO Solid Status: > 8 hours  NPO Liquid Status: > 2 hours           Airway   Mallampati: II  TM distance: >3 FB  Neck ROM: full  No difficulty expected  Dental          Pulmonary - normal exam    breath sounds clear to auscultation  (+) pneumonia stable , asthma,shortness of breath, sleep apnea,   Cardiovascular - normal exam  Exercise tolerance: good (4-7 METS)    Rhythm: regular  Rate: normal    (+) hypertension,       Neuro/Psych  (+) seizures, CVA, headaches, dizziness/light headedness,    GI/Hepatic/Renal/Endo    (+)  GERD, GI bleeding ,     Musculoskeletal (-) negative ROS    Abdominal    Substance History - negative use     OB/GYN negative ob/gyn ROS         Other - negative ROS       ROS/Med Hx Other: PAT Nursing Notes unavailable.                   Anesthesia Plan    ASA 3     general     (Total IV Anesthesia    Patient understands anesthesia not responsible for dental damage.  )  intravenous induction     Anesthetic plan, risks, benefits, and alternatives have been provided, discussed and informed consent has been obtained with: patient.  Pre-procedure education provided  Plan discussed with CRNA.        CODE STATUS:    Level Of Support Discussed With: Patient  Code Status (Patient has no pulse and is not breathing): CPR (Attempt to Resuscitate)  Medical Interventions (Patient has pulse or is breathing): Full Support

## 2023-01-26 NOTE — ANESTHESIA POSTPROCEDURE EVALUATION
Patient: Marcela Gutierrez    Procedure Summary     Date: 01/26/23 Room / Location: [facility] ENDOSCOPY 2 / [facility] ENDOSCOPY    Anesthesia Start: 1126 Anesthesia Stop: 1155    Procedure: ESOPHAGOGASTRODUODENOSCOPY with biopsies, dilation with 15- 18 mm balloon Diagnosis:       Esophageal stricture      (Esophageal stricture [K22.2])    Surgeons: Awais Koehler MD Provider: Ayden Barlow MD    Anesthesia Type: general ASA Status: 3          Anesthesia Type: general    Vitals  Vitals Value Taken Time   /69 01/26/23 1215   Temp 36.3 °C (97.3 °F) 01/26/23 1155   Pulse 88 01/26/23 1216   Resp 16 01/26/23 1200   SpO2 95 % 01/26/23 1216   Vitals shown include unvalidated device data.        Post Anesthesia Care and Evaluation    Patient location during evaluation: bedside  Patient participation: complete - patient participated  Level of consciousness: awake  Pain management: adequate    Airway patency: patent  Anesthetic complications: No anesthetic complications  PONV Status: none  Cardiovascular status: acceptable and stable  Respiratory status: acceptable  Hydration status: acceptable    Comments: An Anesthesiologist personally participated in the most demanding procedures (including induction and emergence if applicable) in the anesthesia plan, monitored the course of anesthesia administration at frequent intervals and remained physically present and available for immediate diagnosis and treatment of emergencies.

## 2023-01-27 LAB
ALBUMIN SERPL-MCNC: 3.7 G/DL (ref 3.5–5.2)
ALP SERPL-CCNC: 72 U/L (ref 39–117)
ALT SERPL W P-5'-P-CCNC: 15 U/L (ref 1–33)
ANION GAP SERPL CALCULATED.3IONS-SCNC: 10.5 MMOL/L (ref 5–15)
AST SERPL-CCNC: 19 U/L (ref 1–32)
BASOPHILS # BLD AUTO: 0.07 10*3/MM3 (ref 0–0.2)
BASOPHILS NFR BLD AUTO: 0.5 % (ref 0–1.5)
BILIRUB CONJ SERPL-MCNC: 0.2 MG/DL (ref 0–0.3)
BILIRUB INDIRECT SERPL-MCNC: 0.6 MG/DL
BILIRUB SERPL-MCNC: 0.8 MG/DL (ref 0–1.2)
BUN SERPL-MCNC: 7 MG/DL (ref 6–20)
BUN/CREAT SERPL: 10.9 (ref 7–25)
CALCIUM SPEC-SCNC: 9.1 MG/DL (ref 8.6–10.5)
CHLORIDE SERPL-SCNC: 100 MMOL/L (ref 98–107)
CO2 SERPL-SCNC: 23.5 MMOL/L (ref 22–29)
CREAT SERPL-MCNC: 0.64 MG/DL (ref 0.57–1)
CYTO UR: NORMAL
DEPRECATED RDW RBC AUTO: 39.3 FL (ref 37–54)
EGFRCR SERPLBLD CKD-EPI 2021: 105.2 ML/MIN/1.73
EOSINOPHIL # BLD AUTO: 0.45 10*3/MM3 (ref 0–0.4)
EOSINOPHIL NFR BLD AUTO: 3.2 % (ref 0.3–6.2)
ERYTHROCYTE [DISTWIDTH] IN BLOOD BY AUTOMATED COUNT: 12.4 % (ref 12.3–15.4)
GLUCOSE SERPL-MCNC: 88 MG/DL (ref 65–99)
HCT VFR BLD AUTO: 35.6 % (ref 34–46.6)
HGB BLD-MCNC: 12 G/DL (ref 12–15.9)
IMM GRANULOCYTES # BLD AUTO: 0.04 10*3/MM3 (ref 0–0.05)
IMM GRANULOCYTES NFR BLD AUTO: 0.3 % (ref 0–0.5)
LAB AP CASE REPORT: NORMAL
LAB AP CLINICAL INFORMATION: NORMAL
LYMPHOCYTES # BLD AUTO: 3.18 10*3/MM3 (ref 0.7–3.1)
LYMPHOCYTES NFR BLD AUTO: 22.6 % (ref 19.6–45.3)
MAGNESIUM SERPL-MCNC: 1.7 MG/DL (ref 1.6–2.6)
MCH RBC QN AUTO: 29.2 PG (ref 26.6–33)
MCHC RBC AUTO-ENTMCNC: 33.7 G/DL (ref 31.5–35.7)
MCV RBC AUTO: 86.6 FL (ref 79–97)
MONOCYTES # BLD AUTO: 0.84 10*3/MM3 (ref 0.1–0.9)
MONOCYTES NFR BLD AUTO: 6 % (ref 5–12)
NEUTROPHILS NFR BLD AUTO: 67.4 % (ref 42.7–76)
NEUTROPHILS NFR BLD AUTO: 9.5 10*3/MM3 (ref 1.7–7)
NRBC BLD AUTO-RTO: 0 /100 WBC (ref 0–0.2)
PATH REPORT.FINAL DX SPEC: NORMAL
PATH REPORT.GROSS SPEC: NORMAL
PHOSPHATE SERPL-MCNC: 2.3 MG/DL (ref 2.5–4.5)
PLATELET # BLD AUTO: 286 10*3/MM3 (ref 140–450)
PMV BLD AUTO: 9.5 FL (ref 6–12)
POTASSIUM SERPL-SCNC: 3.2 MMOL/L (ref 3.5–5.2)
PROT SERPL-MCNC: 7 G/DL (ref 6–8.5)
RBC # BLD AUTO: 4.11 10*6/MM3 (ref 3.77–5.28)
SODIUM SERPL-SCNC: 134 MMOL/L (ref 136–145)
WBC NRBC COR # BLD: 14.08 10*3/MM3 (ref 3.4–10.8)

## 2023-01-27 PROCEDURE — 94664 DEMO&/EVAL PT USE INHALER: CPT

## 2023-01-27 PROCEDURE — 94760 N-INVAS EAR/PLS OXIMETRY 1: CPT

## 2023-01-27 PROCEDURE — 80076 HEPATIC FUNCTION PANEL: CPT | Performed by: FAMILY MEDICINE

## 2023-01-27 PROCEDURE — 25010000002 KETOROLAC TROMETHAMINE PER 15 MG: Performed by: FAMILY MEDICINE

## 2023-01-27 PROCEDURE — 85025 COMPLETE CBC W/AUTO DIFF WBC: CPT | Performed by: FAMILY MEDICINE

## 2023-01-27 PROCEDURE — 94799 UNLISTED PULMONARY SVC/PX: CPT

## 2023-01-27 PROCEDURE — 99232 SBSQ HOSP IP/OBS MODERATE 35: CPT | Performed by: FAMILY MEDICINE

## 2023-01-27 PROCEDURE — 83735 ASSAY OF MAGNESIUM: CPT | Performed by: FAMILY MEDICINE

## 2023-01-27 PROCEDURE — 84100 ASSAY OF PHOSPHORUS: CPT | Performed by: FAMILY MEDICINE

## 2023-01-27 PROCEDURE — 80048 BASIC METABOLIC PNL TOTAL CA: CPT | Performed by: FAMILY MEDICINE

## 2023-01-27 PROCEDURE — 25010000002 PIPERACILLIN SOD-TAZOBACTAM PER 1 G: Performed by: FAMILY MEDICINE

## 2023-01-27 RX ORDER — KETOROLAC TROMETHAMINE 15 MG/ML
15 INJECTION, SOLUTION INTRAMUSCULAR; INTRAVENOUS ONCE
Status: COMPLETED | OUTPATIENT
Start: 2023-01-27 | End: 2023-01-27

## 2023-01-27 RX ORDER — IPRATROPIUM BROMIDE AND ALBUTEROL SULFATE 2.5; .5 MG/3ML; MG/3ML
3 SOLUTION RESPIRATORY (INHALATION) ONCE
Status: COMPLETED | OUTPATIENT
Start: 2023-01-27 | End: 2023-01-27

## 2023-01-27 RX ORDER — POTASSIUM CHLORIDE 7.45 MG/ML
10 INJECTION INTRAVENOUS
Status: DISCONTINUED | OUTPATIENT
Start: 2023-01-27 | End: 2023-01-27

## 2023-01-27 RX ORDER — FENTANYL/ROPIVACAINE/NS/PF 2-625MCG/1
15 PLASTIC BAG, INJECTION (ML) EPIDURAL
Status: COMPLETED | OUTPATIENT
Start: 2023-01-27 | End: 2023-01-27

## 2023-01-27 RX ORDER — IPRATROPIUM BROMIDE AND ALBUTEROL SULFATE 2.5; .5 MG/3ML; MG/3ML
3 SOLUTION RESPIRATORY (INHALATION) EVERY 4 HOURS PRN
Status: DISCONTINUED | OUTPATIENT
Start: 2023-01-27 | End: 2023-01-28 | Stop reason: HOSPADM

## 2023-01-27 RX ADMIN — TOPIRAMATE 100 MG: 100 TABLET, FILM COATED ORAL at 08:51

## 2023-01-27 RX ADMIN — PIPERACILLIN SODIUM AND TAZOBACTAM SODIUM 4.5 G: 4; .5 INJECTION, POWDER, LYOPHILIZED, FOR SOLUTION INTRAVENOUS at 06:09

## 2023-01-27 RX ADMIN — POTASSIUM PHOSPHATE, MONOBASIC AND POTASSIUM PHOSPHATE, DIBASIC 15 MMOL: 224; 236 INJECTION, SOLUTION, CONCENTRATE INTRAVENOUS at 12:53

## 2023-01-27 RX ADMIN — Medication 10 ML: at 08:52

## 2023-01-27 RX ADMIN — ALBUTEROL SULFATE 2.5 MG: 2.5 SOLUTION RESPIRATORY (INHALATION) at 03:35

## 2023-01-27 RX ADMIN — PIPERACILLIN SODIUM AND TAZOBACTAM SODIUM 4.5 G: 4; .5 INJECTION, POWDER, LYOPHILIZED, FOR SOLUTION INTRAVENOUS at 22:12

## 2023-01-27 RX ADMIN — METOPROLOL TARTRATE 50 MG: 50 TABLET, FILM COATED ORAL at 08:51

## 2023-01-27 RX ADMIN — POTASSIUM PHOSPHATE, MONOBASIC AND POTASSIUM PHOSPHATE, DIBASIC 15 MMOL: 224; 236 INJECTION, SOLUTION, CONCENTRATE INTRAVENOUS at 08:52

## 2023-01-27 RX ADMIN — IPRATROPIUM BROMIDE AND ALBUTEROL SULFATE 3 ML: .5; 2.5 SOLUTION RESPIRATORY (INHALATION) at 23:29

## 2023-01-27 RX ADMIN — ALBUTEROL SULFATE 2.5 MG: 2.5 SOLUTION RESPIRATORY (INHALATION) at 11:01

## 2023-01-27 RX ADMIN — KETOROLAC TROMETHAMINE 15 MG: 15 INJECTION, SOLUTION INTRAMUSCULAR; INTRAVENOUS at 17:41

## 2023-01-27 RX ADMIN — PANTOPRAZOLE SODIUM 40 MG: 40 TABLET, DELAYED RELEASE ORAL at 06:08

## 2023-01-27 RX ADMIN — IPRATROPIUM BROMIDE AND ALBUTEROL SULFATE 3 ML: .5; 2.5 SOLUTION RESPIRATORY (INHALATION) at 16:51

## 2023-01-27 RX ADMIN — Medication 10 ML: at 22:12

## 2023-01-27 RX ADMIN — ALBUTEROL SULFATE 2.5 MG: 2.5 SOLUTION RESPIRATORY (INHALATION) at 16:10

## 2023-01-27 RX ADMIN — PIPERACILLIN SODIUM AND TAZOBACTAM SODIUM 4.5 G: 4; .5 INJECTION, POWDER, LYOPHILIZED, FOR SOLUTION INTRAVENOUS at 19:21

## 2023-01-28 ENCOUNTER — READMISSION MANAGEMENT (OUTPATIENT)
Dept: CALL CENTER | Facility: HOSPITAL | Age: 53
End: 2023-01-28
Payer: MEDICARE

## 2023-01-28 VITALS
BODY MASS INDEX: 41.6 KG/M2 | OXYGEN SATURATION: 100 % | TEMPERATURE: 97.2 F | RESPIRATION RATE: 18 BRPM | HEART RATE: 71 BPM | DIASTOLIC BLOOD PRESSURE: 57 MMHG | HEIGHT: 63 IN | SYSTOLIC BLOOD PRESSURE: 121 MMHG | WEIGHT: 234.79 LBS

## 2023-01-28 LAB
ALBUMIN SERPL-MCNC: 3.5 G/DL (ref 3.5–5.2)
ALP SERPL-CCNC: 61 U/L (ref 39–117)
ALT SERPL W P-5'-P-CCNC: 12 U/L (ref 1–33)
ANION GAP SERPL CALCULATED.3IONS-SCNC: 5.7 MMOL/L (ref 5–15)
AST SERPL-CCNC: 16 U/L (ref 1–32)
BASOPHILS # BLD AUTO: 0.06 10*3/MM3 (ref 0–0.2)
BASOPHILS NFR BLD AUTO: 0.6 % (ref 0–1.5)
BILIRUB CONJ SERPL-MCNC: <0.2 MG/DL (ref 0–0.3)
BILIRUB INDIRECT SERPL-MCNC: NORMAL MG/DL
BILIRUB SERPL-MCNC: 0.5 MG/DL (ref 0–1.2)
BUN SERPL-MCNC: 7 MG/DL (ref 6–20)
BUN/CREAT SERPL: 8.8 (ref 7–25)
CALCIUM SPEC-SCNC: 9.1 MG/DL (ref 8.6–10.5)
CHLORIDE SERPL-SCNC: 107 MMOL/L (ref 98–107)
CO2 SERPL-SCNC: 24.3 MMOL/L (ref 22–29)
CREAT SERPL-MCNC: 0.8 MG/DL (ref 0.57–1)
DEPRECATED RDW RBC AUTO: 37.4 FL (ref 37–54)
EGFRCR SERPLBLD CKD-EPI 2021: 87.7 ML/MIN/1.73
EOSINOPHIL # BLD AUTO: 0.43 10*3/MM3 (ref 0–0.4)
EOSINOPHIL NFR BLD AUTO: 4.6 % (ref 0.3–6.2)
ERYTHROCYTE [DISTWIDTH] IN BLOOD BY AUTOMATED COUNT: 12.2 % (ref 12.3–15.4)
GLUCOSE SERPL-MCNC: 115 MG/DL (ref 65–99)
HCT VFR BLD AUTO: 32.6 % (ref 34–46.6)
HGB BLD-MCNC: 11.5 G/DL (ref 12–15.9)
IMM GRANULOCYTES # BLD AUTO: 0.01 10*3/MM3 (ref 0–0.05)
IMM GRANULOCYTES NFR BLD AUTO: 0.1 % (ref 0–0.5)
LYMPHOCYTES # BLD AUTO: 2.2 10*3/MM3 (ref 0.7–3.1)
LYMPHOCYTES NFR BLD AUTO: 23.8 % (ref 19.6–45.3)
MAGNESIUM SERPL-MCNC: 1.9 MG/DL (ref 1.6–2.6)
MCH RBC QN AUTO: 30.1 PG (ref 26.6–33)
MCHC RBC AUTO-ENTMCNC: 35.3 G/DL (ref 31.5–35.7)
MCV RBC AUTO: 85.3 FL (ref 79–97)
MONOCYTES # BLD AUTO: 0.65 10*3/MM3 (ref 0.1–0.9)
MONOCYTES NFR BLD AUTO: 7 % (ref 5–12)
NEUTROPHILS NFR BLD AUTO: 5.9 10*3/MM3 (ref 1.7–7)
NEUTROPHILS NFR BLD AUTO: 63.9 % (ref 42.7–76)
NRBC BLD AUTO-RTO: 0 /100 WBC (ref 0–0.2)
PHOSPHATE SERPL-MCNC: 3 MG/DL (ref 2.5–4.5)
PLATELET # BLD AUTO: 269 10*3/MM3 (ref 140–450)
PMV BLD AUTO: 9.2 FL (ref 6–12)
POTASSIUM SERPL-SCNC: 3.8 MMOL/L (ref 3.5–5.2)
PROT SERPL-MCNC: 6.6 G/DL (ref 6–8.5)
RBC # BLD AUTO: 3.82 10*6/MM3 (ref 3.77–5.28)
SODIUM SERPL-SCNC: 137 MMOL/L (ref 136–145)
WBC NRBC COR # BLD: 9.25 10*3/MM3 (ref 3.4–10.8)

## 2023-01-28 PROCEDURE — 94799 UNLISTED PULMONARY SVC/PX: CPT

## 2023-01-28 PROCEDURE — 99239 HOSP IP/OBS DSCHRG MGMT >30: CPT | Performed by: FAMILY MEDICINE

## 2023-01-28 PROCEDURE — 80076 HEPATIC FUNCTION PANEL: CPT | Performed by: FAMILY MEDICINE

## 2023-01-28 PROCEDURE — 84100 ASSAY OF PHOSPHORUS: CPT | Performed by: FAMILY MEDICINE

## 2023-01-28 PROCEDURE — 83735 ASSAY OF MAGNESIUM: CPT | Performed by: FAMILY MEDICINE

## 2023-01-28 PROCEDURE — 85025 COMPLETE CBC W/AUTO DIFF WBC: CPT | Performed by: FAMILY MEDICINE

## 2023-01-28 PROCEDURE — 80048 BASIC METABOLIC PNL TOTAL CA: CPT | Performed by: FAMILY MEDICINE

## 2023-01-28 PROCEDURE — 25010000002 PIPERACILLIN SOD-TAZOBACTAM PER 1 G: Performed by: FAMILY MEDICINE

## 2023-01-28 PROCEDURE — 94760 N-INVAS EAR/PLS OXIMETRY 1: CPT

## 2023-01-28 RX ORDER — ALBUTEROL SULFATE 90 UG/1
2 AEROSOL, METERED RESPIRATORY (INHALATION) 4 TIMES DAILY PRN
Qty: 18 G | Refills: 0 | Status: SHIPPED | OUTPATIENT
Start: 2023-01-28

## 2023-01-28 RX ORDER — AMOXICILLIN AND CLAVULANATE POTASSIUM 875; 125 MG/1; MG/1
1 TABLET, FILM COATED ORAL 2 TIMES DAILY
Qty: 10 TABLET | Refills: 0 | Status: SHIPPED | OUTPATIENT
Start: 2023-01-28 | End: 2023-02-02

## 2023-01-28 RX ADMIN — PIPERACILLIN SODIUM AND TAZOBACTAM SODIUM 4.5 G: 4; .5 INJECTION, POWDER, LYOPHILIZED, FOR SOLUTION INTRAVENOUS at 05:05

## 2023-01-28 RX ADMIN — PANTOPRAZOLE SODIUM 40 MG: 40 TABLET, DELAYED RELEASE ORAL at 05:05

## 2023-01-28 RX ADMIN — TOPIRAMATE 100 MG: 100 TABLET, FILM COATED ORAL at 09:23

## 2023-01-28 RX ADMIN — METOPROLOL TARTRATE 50 MG: 50 TABLET, FILM COATED ORAL at 09:23

## 2023-01-28 RX ADMIN — Medication 10 ML: at 09:22

## 2023-01-28 RX ADMIN — IPRATROPIUM BROMIDE AND ALBUTEROL SULFATE 3 ML: .5; 2.5 SOLUTION RESPIRATORY (INHALATION) at 05:18

## 2023-01-28 NOTE — OUTREACH NOTE
Prep Survey    Flowsheet Row Responses   [facility] patient discharged from? [facility]   Is LACE score < 7 ? Yes   Eligibility [facility]   Date of Admission 01/26/23   Date of Discharge 01/28/23   Discharge Disposition Home or Self Care   Discharge diagnosis Stenosis of stomach   Does the patient have one of the following disease processes/diagnoses(primary or secondary)? Other   Does the patient have Home health ordered? No   Is there a DME ordered? No   Prep survey completed? Yes          MK LINCOLN - Registered Nurse

## 2023-01-30 ENCOUNTER — TRANSITIONAL CARE MANAGEMENT TELEPHONE ENCOUNTER (OUTPATIENT)
Dept: CALL CENTER | Facility: HOSPITAL | Age: 53
End: 2023-01-30
Payer: MEDICARE

## 2023-01-30 NOTE — OUTREACH NOTE
Call Center TCM Note    Flowsheet Row Responses   [facility] patient discharged from? [facility]   Does the patient have one of the following disease processes/diagnoses(primary or secondary)? Other   TCM attempt successful? Yes  [No current verbal]   Call start time 1055   Call end time 1101   Discharge diagnosis Stenosis of stomach   Meds reviewed with patient/caregiver? Yes   Is the patient having any side effects they believe may be caused by any medication additions or changes? No   Does the patient have all medications ordered at discharge? Yes   Is the patient taking all medications as directed (includes completed medication regime)? Yes   Comments pt unable to accept appt at this time and will call office tomorrow to get appt.    Does the patient have an appointment with their PCP within 7 days of discharge? No   Nursing Interventions Routed TCM call to PCP office, Patient declined scheduling/rescheduling appointment at this time   Has home health visited the patient within 72 hours of discharge? N/A   Psychosocial issues? No   Did the patient receive a copy of their discharge instructions? Yes   Nursing interventions Reviewed instructions with patient   What is the patient's perception of their health status since discharge? Improving   Is the patient/caregiver able to teach back signs and symptoms related to disease process for when to call PCP? Yes   Is the patient/caregiver able to teach back signs and symptoms related to disease process for when to call 911? Yes   Is the patient/caregiver able to teach back the hierarchy of who to call/visit for symptoms/problems? PCP, Specialist, Home health nurse, Urgent Care, ED, 911 Yes   TCM call completed? Yes   Wrap up additional comments pt reports she is doing ok. Pt has refill for inhaler that she is waiting on, hwoever she still has some  at this time.    Call end time 1101          Amanda Aguilar RN    1/30/2023, 11:02 EST

## 2023-01-31 LAB
BACTERIA SPEC AEROBE CULT: NORMAL
BACTERIA SPEC AEROBE CULT: NORMAL

## 2023-02-07 ENCOUNTER — LAB (OUTPATIENT)
Dept: LAB | Facility: HOSPITAL | Age: 53
End: 2023-02-07
Payer: MEDICARE

## 2023-02-07 ENCOUNTER — OFFICE VISIT (OUTPATIENT)
Dept: FAMILY MEDICINE CLINIC | Age: 53
End: 2023-02-07
Payer: MEDICARE

## 2023-02-07 VITALS
DIASTOLIC BLOOD PRESSURE: 84 MMHG | OXYGEN SATURATION: 96 % | BODY MASS INDEX: 42.57 KG/M2 | HEIGHT: 63 IN | HEART RATE: 68 BPM | SYSTOLIC BLOOD PRESSURE: 136 MMHG | WEIGHT: 240.25 LBS

## 2023-02-07 DIAGNOSIS — K31.1 GASTRIC OUTLET OBSTRUCTION: Primary | ICD-10-CM

## 2023-02-07 DIAGNOSIS — E83.39 HYPOPHOSPHATEMIA: ICD-10-CM

## 2023-02-07 DIAGNOSIS — E86.0 DEHYDRATION: ICD-10-CM

## 2023-02-07 DIAGNOSIS — K21.9 GASTROESOPHAGEAL REFLUX DISEASE WITHOUT ESOPHAGITIS: ICD-10-CM

## 2023-02-07 DIAGNOSIS — J69.0 ASPIRATION PNEUMONIA, UNSPECIFIED ASPIRATION PNEUMONIA TYPE, UNSPECIFIED LATERALITY, UNSPECIFIED PART OF LUNG: ICD-10-CM

## 2023-02-07 DIAGNOSIS — E87.6 HYPOKALEMIA: ICD-10-CM

## 2023-02-07 DIAGNOSIS — I10 PRIMARY HYPERTENSION: ICD-10-CM

## 2023-02-07 DIAGNOSIS — Z98.84 HISTORY OF GASTRIC STAPLING: ICD-10-CM

## 2023-02-07 PROCEDURE — 80048 BASIC METABOLIC PNL TOTAL CA: CPT

## 2023-02-07 PROCEDURE — 84100 ASSAY OF PHOSPHORUS: CPT

## 2023-02-07 PROCEDURE — 36415 COLL VENOUS BLD VENIPUNCTURE: CPT

## 2023-02-07 PROCEDURE — 99495 TRANSJ CARE MGMT MOD F2F 14D: CPT | Performed by: FAMILY MEDICINE

## 2023-02-07 PROCEDURE — 1111F DSCHRG MED/CURRENT MED MERGE: CPT | Performed by: FAMILY MEDICINE

## 2023-02-07 RX ORDER — ESOMEPRAZOLE MAGNESIUM 40 MG/1
40 CAPSULE, DELAYED RELEASE ORAL
COMMUNITY
End: 2023-02-27 | Stop reason: SDUPTHER

## 2023-02-07 RX ORDER — MULTIVITAMIN WITH IRON
TABLET ORAL DAILY
COMMUNITY
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
## 2023-02-08 LAB
ANION GAP SERPL CALCULATED.3IONS-SCNC: 8 MMOL/L (ref 5–15)
BUN SERPL-MCNC: 18 MG/DL (ref 6–20)
BUN/CREAT SERPL: 22.5 (ref 7–25)
CALCIUM SPEC-SCNC: 10.3 MG/DL (ref 8.6–10.5)
CHLORIDE SERPL-SCNC: 101 MMOL/L (ref 98–107)
CO2 SERPL-SCNC: 28 MMOL/L (ref 22–29)
CREAT SERPL-MCNC: 0.8 MG/DL (ref 0.57–1)
EGFRCR SERPLBLD CKD-EPI 2021: 87.7 ML/MIN/1.73
GLUCOSE SERPL-MCNC: 86 MG/DL (ref 65–99)
PHOSPHATE SERPL-MCNC: 3.7 MG/DL (ref 2.5–4.5)
POTASSIUM SERPL-SCNC: 3.8 MMOL/L (ref 3.5–5.2)
SODIUM SERPL-SCNC: 137 MMOL/L (ref 136–145)

## 2023-02-20 RX ORDER — HYDROCHLOROTHIAZIDE 25 MG/1
TABLET ORAL
Qty: 90 TABLET | Refills: 1 | Status: SHIPPED | OUTPATIENT
Start: 2023-02-20

## 2023-02-27 ENCOUNTER — LAB (OUTPATIENT)
Dept: LAB | Facility: HOSPITAL | Age: 53
End: 2023-02-27
Payer: MEDICARE

## 2023-02-27 ENCOUNTER — OFFICE VISIT (OUTPATIENT)
Dept: GASTROENTEROLOGY | Facility: CLINIC | Age: 53
End: 2023-02-27
Payer: MEDICARE

## 2023-02-27 VITALS
SYSTOLIC BLOOD PRESSURE: 138 MMHG | WEIGHT: 237 LBS | DIASTOLIC BLOOD PRESSURE: 74 MMHG | OXYGEN SATURATION: 100 % | HEIGHT: 63 IN | BODY MASS INDEX: 41.99 KG/M2 | HEART RATE: 61 BPM

## 2023-02-27 DIAGNOSIS — K21.9 GASTROESOPHAGEAL REFLUX DISEASE, UNSPECIFIED WHETHER ESOPHAGITIS PRESENT: ICD-10-CM

## 2023-02-27 DIAGNOSIS — D50.8 IRON DEFICIENCY ANEMIA SECONDARY TO INADEQUATE DIETARY IRON INTAKE: ICD-10-CM

## 2023-02-27 DIAGNOSIS — K31.89 STENOSIS OF STOMACH: ICD-10-CM

## 2023-02-27 DIAGNOSIS — K31.89 STENOSIS OF STOMACH: Primary | ICD-10-CM

## 2023-02-27 LAB
BASOPHILS # BLD AUTO: 0.11 10*3/MM3 (ref 0–0.2)
BASOPHILS NFR BLD AUTO: 1.3 % (ref 0–1.5)
DEPRECATED RDW RBC AUTO: 39.5 FL (ref 37–54)
EOSINOPHIL # BLD AUTO: 0.3 10*3/MM3 (ref 0–0.4)
EOSINOPHIL NFR BLD AUTO: 3.5 % (ref 0.3–6.2)
ERYTHROCYTE [DISTWIDTH] IN BLOOD BY AUTOMATED COUNT: 12.6 % (ref 12.3–15.4)
HCT VFR BLD AUTO: 40.1 % (ref 34–46.6)
HGB BLD-MCNC: 13.4 G/DL (ref 12–15.9)
IMM GRANULOCYTES # BLD AUTO: 0.01 10*3/MM3 (ref 0–0.05)
IMM GRANULOCYTES NFR BLD AUTO: 0.1 % (ref 0–0.5)
IRON 24H UR-MRATE: 80 MCG/DL (ref 37–145)
IRON SATN MFR SERPL: 18 % (ref 20–50)
LYMPHOCYTES # BLD AUTO: 2.98 10*3/MM3 (ref 0.7–3.1)
LYMPHOCYTES NFR BLD AUTO: 35.2 % (ref 19.6–45.3)
MCH RBC QN AUTO: 29.2 PG (ref 26.6–33)
MCHC RBC AUTO-ENTMCNC: 33.4 G/DL (ref 31.5–35.7)
MCV RBC AUTO: 87.4 FL (ref 79–97)
MONOCYTES # BLD AUTO: 0.45 10*3/MM3 (ref 0.1–0.9)
MONOCYTES NFR BLD AUTO: 5.3 % (ref 5–12)
NEUTROPHILS NFR BLD AUTO: 4.62 10*3/MM3 (ref 1.7–7)
NEUTROPHILS NFR BLD AUTO: 54.6 % (ref 42.7–76)
NRBC BLD AUTO-RTO: 0 /100 WBC (ref 0–0.2)
PLATELET # BLD AUTO: 356 10*3/MM3 (ref 140–450)
PMV BLD AUTO: 9.6 FL (ref 6–12)
RBC # BLD AUTO: 4.59 10*6/MM3 (ref 3.77–5.28)
TIBC SERPL-MCNC: 441 MCG/DL (ref 298–536)
TRANSFERRIN SERPL-MCNC: 296 MG/DL (ref 200–360)
WBC NRBC COR # BLD: 8.47 10*3/MM3 (ref 3.4–10.8)

## 2023-02-27 PROCEDURE — 85025 COMPLETE CBC W/AUTO DIFF WBC: CPT

## 2023-02-27 PROCEDURE — 83540 ASSAY OF IRON: CPT

## 2023-02-27 PROCEDURE — 99214 OFFICE O/P EST MOD 30 MIN: CPT | Performed by: NURSE PRACTITIONER

## 2023-02-27 PROCEDURE — 36415 COLL VENOUS BLD VENIPUNCTURE: CPT

## 2023-02-27 PROCEDURE — 84466 ASSAY OF TRANSFERRIN: CPT

## 2023-02-27 RX ORDER — ESOMEPRAZOLE MAGNESIUM 40 MG/1
40 CAPSULE, DELAYED RELEASE ORAL
Qty: 30 CAPSULE | Refills: 5 | Status: SHIPPED | OUTPATIENT
Start: 2023-02-27

## 2023-02-27 NOTE — PATIENT INSTRUCTIONS
Food Choices for Gastroesophageal Reflux Disease, Adult  When you have gastroesophageal reflux disease (GERD), the foods you eat and your eating habits are very important. Choosing the right foods can help ease your discomfort. Think about working with a food expert (dietitian) to help you make good choices.  What are tips for following this plan?  Reading food labels  Look for foods that are low in saturated fat. Foods that may help with your symptoms include:  Foods that have less than 5% of daily value (DV) of fat.  Foods that have 0 grams of trans fat.  Cooking  Do not gordon your food.  Cook your food by baking, steaming, grilling, or broiling. These are all methods that do not need a lot of fat for cooking.  To add flavor, try to use herbs that are low in spice and acidity.  Meal planning    Choose healthy foods that are low in fat, such as:  Fruits and vegetables.  Whole grains.  Low-fat dairy products.  Lean meats, fish, and poultry.  Eat small meals often instead of eating 3 large meals each day. Eat your meals slowly in a place where you are relaxed. Avoid bending over or lying down until 2-3 hours after eating.  Limit high-fat foods such as fatty meats or fried foods.  Limit your intake of fatty foods, such as oils, butter, and shortening.  Avoid the following as told by your doctor:  Foods that cause symptoms. These may be different for different people. Keep a food diary to keep track of foods that cause symptoms.  Alcohol.  Drinking a lot of liquid with meals.  Eating meals during the 2-3 hours before bed.  Lifestyle  Stay at a healthy weight. Ask your doctor what weight is healthy for you. If you need to lose weight, work with your doctor to do so safely.  Exercise for at least 30 minutes on 5 or more days each week, or as told by your doctor.  Wear loose-fitting clothes.  Do not smoke or use any products that contain nicotine or tobacco. If you need help quitting, ask your doctor.  Sleep with the head  of your bed higher than your feet. Use a wedge under the mattress or blocks under the bed frame to raise the head of the bed.  Chew sugar-free gum after meals.  What foods should eat?  Eat a healthy, well-balanced diet of fruits, vegetables, whole grains, low-fat dairy products, lean meats, fish, and poultry. Each person is different.  Foods that may cause symptoms in one person may not cause any symptoms in another person. Work with your doctor to find foods that are safe for you.  The items listed above may not be a complete list of what you can eat and drink. Contact a food expert for more options.  What foods should I avoid?  Limiting some of these foods may help in managing the symptoms of GERD. Everyone is different. Talk with a food expert or your doctor to help you find the exact foods to avoid, if any.  Fruits  Any fruits prepared with added fat. Any fruits that cause symptoms. For some people, this may include citrus fruits, such as oranges, grapefruit, pineapple, and gladys.  Vegetables  Deep-fried vegetables. French fries. Any vegetables prepared with added fat. Any vegetables that cause symptoms. For some people, this may include tomatoes and tomato products, chili peppers, onions and garlic, and horseradish.  Grains  Pastries or quick breads with added fat.  Meats and other proteins  High-fat meats, such as fatty beef or pork, hot dogs, ribs, ham, sausage, salami, and grimes. Fried meat or protein, including fried fish and fried chicken. Nuts and nut butters, in large amounts.  Dairy  Whole milk and chocolate milk. Sour cream. Cream. Ice cream. Cream cheese. Milkshakes.  Fats and oils  Butter. Margarine. Shortening. Ghee.  Beverages  Coffee and tea, with or without caffeine. Carbonated beverages. Sodas. Energy drinks. Fruit juice made with acidic fruits, such as orange or grapefruit. Tomato juice. Alcoholic drinks.  Sweets and desserts  Chocolate and cocoa. Donuts.  Seasonings and condiments  Pepper.  Peppermint and spearmint. Added salt. Any condiments, herbs, or seasonings that cause symptoms. For some people, this may include caruso, hot sauce, or vinegar-based salad dressings.  The items listed above may not be a complete list of what you should not eat and drink. Contact a food expert for more options.  Questions to ask your doctor  Diet and lifestyle changes are often the first steps that are taken to manage symptoms of GERD. If diet and lifestyle changes do not help, talk with your doctor about taking medicines.  Where to find more information  [employer]: aboutgerd.org  Summary  When you have GERD, food and lifestyle choices are very important in easing your symptoms.  Eat small meals often instead of 3 large meals a day. Eat your meals slowly and in a place where you are relaxed.  Avoid bending over or lying down until 2-3 hours after eating.  Limit high-fat foods such as fatty meats or fried foods.  This information is not intended to replace advice given to you by your health care provider. Make sure you discuss any questions you have with your health care provider.  Document Revised: 06/28/2021 Document Reviewed: 06/28/2021  Elsevier Patient Education © 2022 [employer]
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
## 2023-03-08 ENCOUNTER — TELEPHONE (OUTPATIENT)
Dept: GASTROENTEROLOGY | Facility: CLINIC | Age: 53
End: 2023-03-08
Payer: MEDICARE

## 2023-03-08 RX ORDER — TOPIRAMATE 100 MG/1
TABLET, FILM COATED ORAL
Qty: 90 TABLET | Refills: 0 | Status: SHIPPED | OUTPATIENT
Start: 2023-03-08

## 2023-03-08 NOTE — TELEPHONE ENCOUNTER
----- Message from ABRAHAM Hammer sent at 2/28/2023  2:24 PM EST -----  Normal CBC with no anemia.  Iron saturation slightly low at 18% would recommend multivitamin with iron

## 2023-03-29 RX ORDER — METOPROLOL TARTRATE 50 MG/1
TABLET, FILM COATED ORAL
Qty: 90 TABLET | Refills: 0 | Status: SHIPPED | OUTPATIENT
Start: 2023-03-29

## 2023-04-22 DIAGNOSIS — J45.909 ASTHMA, UNSPECIFIED ASTHMA SEVERITY, UNSPECIFIED WHETHER COMPLICATED, UNSPECIFIED WHETHER PERSISTENT: ICD-10-CM

## 2023-04-24 RX ORDER — ALBUTEROL SULFATE 90 UG/1
2 AEROSOL, METERED RESPIRATORY (INHALATION) 4 TIMES DAILY PRN
Qty: 18 G | Refills: 1 | Status: SHIPPED | OUTPATIENT
Start: 2023-04-24

## 2023-05-02 ENCOUNTER — HOSPITAL ENCOUNTER (OUTPATIENT)
Dept: GENERAL RADIOLOGY | Facility: HOSPITAL | Age: 53
Discharge: HOME | End: 2023-05-02
Payer: MEDICARE

## 2023-05-02 ENCOUNTER — OFFICE VISIT (OUTPATIENT)
Dept: FAMILY MEDICINE CLINIC | Age: 53
End: 2023-05-02
Payer: MEDICARE

## 2023-05-02 ENCOUNTER — LAB (OUTPATIENT)
Dept: LAB | Facility: HOSPITAL | Age: 53
End: 2023-05-02
Payer: MEDICARE

## 2023-05-02 VITALS
TEMPERATURE: 99.1 F | WEIGHT: 234.8 LBS | OXYGEN SATURATION: 97 % | HEART RATE: 89 BPM | HEIGHT: 63 IN | BODY MASS INDEX: 41.6 KG/M2 | SYSTOLIC BLOOD PRESSURE: 132 MMHG | DIASTOLIC BLOOD PRESSURE: 72 MMHG

## 2023-05-02 DIAGNOSIS — R05.1 ACUTE COUGH: ICD-10-CM

## 2023-05-02 DIAGNOSIS — R05.1 ACUTE COUGH: Primary | ICD-10-CM

## 2023-05-02 LAB
BASOPHILS # BLD AUTO: 0.09 10*3/MM3 (ref 0–0.2)
BASOPHILS NFR BLD AUTO: 0.8 % (ref 0–1.5)
DEPRECATED RDW RBC AUTO: 37.1 FL (ref 37–54)
EOSINOPHIL # BLD AUTO: 0.58 10*3/MM3 (ref 0–0.4)
EOSINOPHIL NFR BLD AUTO: 4.9 % (ref 0.3–6.2)
ERYTHROCYTE [DISTWIDTH] IN BLOOD BY AUTOMATED COUNT: 12.3 % (ref 12.3–15.4)
EXPIRATION DATE: NORMAL
FLUAV AG UPPER RESP QL IA.RAPID: NOT DETECTED
FLUBV AG UPPER RESP QL IA.RAPID: NOT DETECTED
HCT VFR BLD AUTO: 41.4 % (ref 34–46.6)
HGB BLD-MCNC: 13.7 G/DL (ref 12–15.9)
IMM GRANULOCYTES # BLD AUTO: 0.03 10*3/MM3 (ref 0–0.05)
IMM GRANULOCYTES NFR BLD AUTO: 0.3 % (ref 0–0.5)
INTERNAL CONTROL: NORMAL
LYMPHOCYTES # BLD AUTO: 3.74 10*3/MM3 (ref 0.7–3.1)
LYMPHOCYTES NFR BLD AUTO: 31.7 % (ref 19.6–45.3)
Lab: NORMAL
MCH RBC QN AUTO: 28 PG (ref 26.6–33)
MCHC RBC AUTO-ENTMCNC: 33.1 G/DL (ref 31.5–35.7)
MCV RBC AUTO: 84.5 FL (ref 79–97)
MONOCYTES # BLD AUTO: 0.54 10*3/MM3 (ref 0.1–0.9)
MONOCYTES NFR BLD AUTO: 4.6 % (ref 5–12)
NEUTROPHILS NFR BLD AUTO: 57.7 % (ref 42.7–76)
NEUTROPHILS NFR BLD AUTO: 6.81 10*3/MM3 (ref 1.7–7)
NRBC BLD AUTO-RTO: 0 /100 WBC (ref 0–0.2)
PLATELET # BLD AUTO: 391 10*3/MM3 (ref 140–450)
PMV BLD AUTO: 10 FL (ref 6–12)
RBC # BLD AUTO: 4.9 10*6/MM3 (ref 3.77–5.28)
SARS-COV-2 AG UPPER RESP QL IA.RAPID: NOT DETECTED
WBC NRBC COR # BLD: 11.79 10*3/MM3 (ref 3.4–10.8)

## 2023-05-02 PROCEDURE — 36415 COLL VENOUS BLD VENIPUNCTURE: CPT

## 2023-05-02 PROCEDURE — 71046 X-RAY EXAM CHEST 2 VIEWS: CPT

## 2023-05-02 PROCEDURE — 85025 COMPLETE CBC W/AUTO DIFF WBC: CPT

## 2023-05-02 RX ORDER — AMOXICILLIN AND CLAVULANATE POTASSIUM 875; 125 MG/1; MG/1
1 TABLET, FILM COATED ORAL 2 TIMES DAILY
Qty: 14 TABLET | Refills: 0 | Status: SHIPPED | OUTPATIENT
Start: 2023-05-02 | End: 2023-05-09

## 2023-05-02 NOTE — PROGRESS NOTES
Subjective     CHIEF COMPLAINT    Chief Complaint   Patient presents with   • Cough   • Shortness of Breath     History of Present Illness  Patient is a 52-year-old female who presents to the clinic today with complaints of cough, shortness of breath and fever.  She is concerned that she may have pneumonia due to aspiration.     Patient describes a situation where she was hospitalized in January due to gastric obstruction and stenosis which she subsequently developed aspiration pneumonia due to frequent vomiting.  The gastric obstruction/stricture was fixed by Dr. Koehler.  She recovered well from this and reports that she got over the cough within about a month.  She is concerned that she may have aspirated last night.  She is having a lot of mucus and feels like she at times has to vomit it up.  She has pretty significant acid reflux as well so she does not eat past 5 PM and sleeps with the head of her bed up.  She is on Nexium. She is not currently having any abdominal pain, nausea or vomiting as she had with the episode in January    There is note of asthma in her chart, normally well controlled on her albuterol inhaler.     Review of Systems   Constitutional: Positive for fever.   HENT: Positive for congestion.    Respiratory: Positive for cough and shortness of breath. Negative for wheezing.    Cardiovascular: Negative for chest pain.   Gastrointestinal: Negative for abdominal pain, nausea and vomiting.     Allergies   Allergen Reactions   • Green Dyes Unknown - High Severity   • Latex Hives   • Morphine Hallucinations   • Oxycodone-Acetaminophen Hives   • Zoloft [Sertraline] Myalgia   • Mastisol [Wound Dressing Adhesive] Unknown - High Severity   • Adhesive Tape Rash       Current Outpatient Medications on File Prior to Visit   Medication Sig Dispense Refill   • albuterol sulfate  (90 Base) MCG/ACT inhaler INHALE 2 PUFFS 4 (FOUR) TIMES A DAY AS NEEDED FOR WHEEZING. 18 g 1   • esomeprazole (nexIUM) 40  "MG capsule Take 1 capsule by mouth Every Morning Before Breakfast. 30 capsule 5   • hydroCHLOROthiazide (HYDRODIURIL) 25 MG tablet TAKE 1 TABLET EVERY DAY 90 tablet 1   • Magnesium 250 MG tablet Take  by mouth Daily.     • metoprolol tartrate (LOPRESSOR) 50 MG tablet TAKE 1 TABLET EVERY DAY 90 tablet 0   • multivitamin with minerals tablet tablet Take 1 tablet by mouth Daily.     • Omega-3 Fatty Acids (FISH OIL PO) Take 1 capsule by mouth Daily.     • ondansetron ODT (ZOFRAN-ODT) 4 MG disintegrating tablet Place 1 tablet on the tongue Every 8 (Eight) Hours As Needed for Nausea or Vomiting. 12 tablet 0   • topiramate (TOPAMAX) 100 MG tablet TAKE 1 TABLET EVERY DAY 90 tablet 0     No current facility-administered medications on file prior to visit.     /72 (BP Location: Left arm, Patient Position: Sitting)   Pulse 89   Temp 99.1 °F (37.3 °C) (Oral)   Ht 160 cm (62.99\")   Wt 107 kg (234 lb 12.8 oz)   SpO2 97%   BMI 41.60 kg/m²     Objective     Physical Exam  Vitals and nursing note reviewed.   Constitutional:       General: She is not in acute distress.     Appearance: Normal appearance. She is not ill-appearing.   HENT:      Head: Normocephalic.      Right Ear: Tympanic membrane, ear canal and external ear normal.      Left Ear: Tympanic membrane, ear canal and external ear normal.      Nose: Nose normal.      Mouth/Throat:      Lips: Pink.      Mouth: Mucous membranes are moist.      Pharynx: Oropharynx is clear. No pharyngeal swelling, oropharyngeal exudate, posterior oropharyngeal erythema or uvula swelling.   Eyes:      Extraocular Movements: Extraocular movements intact.      Pupils: Pupils are equal, round, and reactive to light.   Cardiovascular:      Rate and Rhythm: Normal rate and regular rhythm.      Heart sounds: Normal heart sounds. No murmur heard.  Pulmonary:      Effort: Pulmonary effort is normal. No accessory muscle usage or respiratory distress.      Breath sounds: Normal breath sounds. " No wheezing or rhonchi.   Lymphadenopathy:      Cervical: No cervical adenopathy.   Neurological:      General: No focal deficit present.      Mental Status: She is alert and oriented to person, place, and time.   Psychiatric:         Mood and Affect: Mood and affect normal.         Behavior: Behavior normal.       Results for orders placed or performed in visit on 05/02/23   POCT SARS-CoV-2 Antigen NINA + Flu    Specimen: Swab   Result Value Ref Range    SARS Antigen Not Detected Not Detected, Presumptive Negative    Influenza A Antigen NINA Not Detected Not Detected    Influenza B Antigen NINA Not Detected Not Detected    Internal Control Passed Passed    Lot Number [id number]     Expiration Date 12/6/2023            Diagnoses and all orders for this visit:    1. Acute cough (Primary)  Comments:  Recommend OTC mucinex  Orders:  -     POCT SARS-CoV-2 Antigen NINA + Flu  -     XR Chest PA & Lateral; Future  -     CBC w AUTO Differential; Future    Other orders  -     amoxicillin-clavulanate (Augmentin) 875-125 MG per tablet; Take 1 tablet by mouth 2 (Two) Times a Day for 7 days.  Dispense: 14 tablet; Refill: 0  -     budesonide (PULMICORT) 90 MCG/ACT inhaler; Inhale 1 puff 2 (Two) Times a Day.  Dispense: 1 each; Refill: 0      Patient is negative for COVID and flu on rapid testing.  Her lungs sound clear on exam and vitals are stable. Today we will check a chest x-ray and order a CBC.  However given the time of the day we will likely not get these results back until tomorrow.  I will go ahead and start patient on empiric antibiotic therapy with Augmentin. I will also send in Pulmicort for her to use. I discussed this case with Dr. Espinal, on call provider and patients PCP who advised on the above treatment plan. Further plan pending result of chest x ray and CBC. She should return to clinic if symptoms worsen or do not improve. ER precautions also advised. We discussed her acid reflux/concerns for aspiration for which she  will follow up with her GI doctor.     Addendum: Chest x ray shows no acute cardiopulmonary disease. CBC shows mildly elevated WBC, however it appears to be viral in nature. I discussed her x ray and CBC results with Dr. Teague, on call physician. We would recommend patient stop Augmentin given these findings.     Follow up:  Return if symptoms worsen or fail to improve.  Patient was given instructions and counseling regarding her condition or for health maintenance advice. Please see specific information pulled into the AVS if appropriate.

## 2023-08-02 RX ORDER — TOPIRAMATE 100 MG/1
TABLET, FILM COATED ORAL
Qty: 90 TABLET | Refills: 0 | Status: SHIPPED | OUTPATIENT
Start: 2023-08-02

## 2023-08-25 RX ORDER — METOPROLOL TARTRATE 50 MG/1
TABLET, FILM COATED ORAL
Qty: 90 TABLET | Refills: 0 | Status: SHIPPED | OUTPATIENT
Start: 2023-08-25

## 2023-09-14 RX ORDER — ESOMEPRAZOLE MAGNESIUM 40 MG/1
CAPSULE, DELAYED RELEASE ORAL
Qty: 90 CAPSULE | Refills: 1 | Status: SHIPPED | OUTPATIENT
Start: 2023-09-14

## 2023-09-28 RX ORDER — HYDROCHLOROTHIAZIDE 25 MG/1
TABLET ORAL
Qty: 90 TABLET | Refills: 0 | Status: SHIPPED | OUTPATIENT
Start: 2023-09-28

## 2023-10-03 ENCOUNTER — OFFICE VISIT (OUTPATIENT)
Dept: FAMILY MEDICINE CLINIC | Age: 53
End: 2023-10-03
Payer: MEDICARE

## 2023-10-03 ENCOUNTER — LAB (OUTPATIENT)
Dept: LAB | Facility: HOSPITAL | Age: 53
End: 2023-10-03
Payer: MEDICARE

## 2023-10-03 ENCOUNTER — TELEPHONE (OUTPATIENT)
Dept: GASTROENTEROLOGY | Facility: CLINIC | Age: 53
End: 2023-10-03
Payer: MEDICARE

## 2023-10-03 ENCOUNTER — PREP FOR SURGERY (OUTPATIENT)
Dept: OTHER | Facility: HOSPITAL | Age: 53
End: 2023-10-03
Payer: MEDICARE

## 2023-10-03 VITALS
SYSTOLIC BLOOD PRESSURE: 139 MMHG | TEMPERATURE: 97.6 F | DIASTOLIC BLOOD PRESSURE: 84 MMHG | OXYGEN SATURATION: 97 % | WEIGHT: 231 LBS | HEIGHT: 63 IN | HEART RATE: 68 BPM | BODY MASS INDEX: 40.93 KG/M2

## 2023-10-03 DIAGNOSIS — K21.9 GASTROESOPHAGEAL REFLUX DISEASE WITHOUT ESOPHAGITIS: ICD-10-CM

## 2023-10-03 DIAGNOSIS — Z78.0 POSTMENOPAUSAL STATE: ICD-10-CM

## 2023-10-03 DIAGNOSIS — E55.9 VITAMIN D DEFICIENCY: ICD-10-CM

## 2023-10-03 DIAGNOSIS — Z98.2 VP (VENTRICULOPERITONEAL) SHUNT STATUS: ICD-10-CM

## 2023-10-03 DIAGNOSIS — Z86.73 HISTORY OF CVA (CEREBROVASCULAR ACCIDENT): ICD-10-CM

## 2023-10-03 DIAGNOSIS — Z87.19 HISTORY OF ESOPHAGEAL STRICTURE: ICD-10-CM

## 2023-10-03 DIAGNOSIS — D50.8 IRON DEFICIENCY ANEMIA SECONDARY TO INADEQUATE DIETARY IRON INTAKE: ICD-10-CM

## 2023-10-03 DIAGNOSIS — E78.00 HYPERCHOLESTEROLEMIA: ICD-10-CM

## 2023-10-03 DIAGNOSIS — K21.9 GASTROESOPHAGEAL REFLUX DISEASE, UNSPECIFIED WHETHER ESOPHAGITIS PRESENT: ICD-10-CM

## 2023-10-03 DIAGNOSIS — R13.10 DYSPHAGIA, UNSPECIFIED TYPE: ICD-10-CM

## 2023-10-03 DIAGNOSIS — E66.01 MORBID OBESITY WITH BMI OF 40.0-44.9, ADULT: ICD-10-CM

## 2023-10-03 DIAGNOSIS — Z12.31 ENCOUNTER FOR SCREENING MAMMOGRAM FOR BREAST CANCER: ICD-10-CM

## 2023-10-03 DIAGNOSIS — Z98.84 HISTORY OF GASTRIC STAPLING: ICD-10-CM

## 2023-10-03 DIAGNOSIS — J45.909 ASTHMA, UNSPECIFIED ASTHMA SEVERITY, UNSPECIFIED WHETHER COMPLICATED, UNSPECIFIED WHETHER PERSISTENT: ICD-10-CM

## 2023-10-03 DIAGNOSIS — Z00.00 ENCOUNTER FOR ANNUAL WELLNESS EXAM IN MEDICARE PATIENT: Primary | ICD-10-CM

## 2023-10-03 DIAGNOSIS — Z23 IMMUNIZATION DUE: ICD-10-CM

## 2023-10-03 DIAGNOSIS — K22.2 ESOPHAGEAL STRICTURE: Primary | ICD-10-CM

## 2023-10-03 DIAGNOSIS — I10 PRIMARY HYPERTENSION: ICD-10-CM

## 2023-10-03 DIAGNOSIS — R26.89 POOR BALANCE: ICD-10-CM

## 2023-10-03 DIAGNOSIS — Z12.11 COLON CANCER SCREENING: ICD-10-CM

## 2023-10-03 LAB
25(OH)D3 SERPL-MCNC: 62.7 NG/ML (ref 30–100)
ALBUMIN SERPL-MCNC: 4.6 G/DL (ref 3.5–5.2)
ALBUMIN/GLOB SERPL: 1.4 G/DL
ALP SERPL-CCNC: 80 U/L (ref 39–117)
ALT SERPL W P-5'-P-CCNC: 20 U/L (ref 1–33)
ANION GAP SERPL CALCULATED.3IONS-SCNC: 10 MMOL/L (ref 5–15)
AST SERPL-CCNC: 27 U/L (ref 1–32)
BILIRUB SERPL-MCNC: 0.2 MG/DL (ref 0–1.2)
BUN SERPL-MCNC: 9 MG/DL (ref 6–20)
BUN/CREAT SERPL: 11.4 (ref 7–25)
CALCIUM SPEC-SCNC: 10.1 MG/DL (ref 8.6–10.5)
CHLORIDE SERPL-SCNC: 102 MMOL/L (ref 98–107)
CHOLEST SERPL-MCNC: 190 MG/DL (ref 0–200)
CO2 SERPL-SCNC: 27 MMOL/L (ref 22–29)
CREAT SERPL-MCNC: 0.79 MG/DL (ref 0.57–1)
DEPRECATED RDW RBC AUTO: 39.9 FL (ref 37–54)
EGFRCR SERPLBLD CKD-EPI 2021: 88.5 ML/MIN/1.73
ERYTHROCYTE [DISTWIDTH] IN BLOOD BY AUTOMATED COUNT: 12.8 % (ref 12.3–15.4)
GLOBULIN UR ELPH-MCNC: 3.4 GM/DL
GLUCOSE SERPL-MCNC: 89 MG/DL (ref 65–99)
HCT VFR BLD AUTO: 40.4 % (ref 34–46.6)
HDLC SERPL-MCNC: 50 MG/DL (ref 40–60)
HGB BLD-MCNC: 13.8 G/DL (ref 12–15.9)
IRON 24H UR-MRATE: 69 MCG/DL (ref 37–145)
IRON SATN MFR SERPL: 17 % (ref 20–50)
LDLC SERPL CALC-MCNC: 122 MG/DL (ref 0–100)
LDLC/HDLC SERPL: 2.41 {RATIO}
MCH RBC QN AUTO: 29.1 PG (ref 26.6–33)
MCHC RBC AUTO-ENTMCNC: 34.2 G/DL (ref 31.5–35.7)
MCV RBC AUTO: 85.1 FL (ref 79–97)
PLATELET # BLD AUTO: 318 10*3/MM3 (ref 140–450)
PMV BLD AUTO: 8.1 FL (ref 6–12)
POTASSIUM SERPL-SCNC: 3.6 MMOL/L (ref 3.5–5.2)
PROT SERPL-MCNC: 8 G/DL (ref 6–8.5)
RBC # BLD AUTO: 4.75 10*6/MM3 (ref 3.77–5.28)
SODIUM SERPL-SCNC: 139 MMOL/L (ref 136–145)
TIBC SERPL-MCNC: 416 MCG/DL (ref 298–536)
TRANSFERRIN SERPL-MCNC: 279 MG/DL (ref 200–360)
TRIGL SERPL-MCNC: 98 MG/DL (ref 0–150)
VLDLC SERPL-MCNC: 18 MG/DL (ref 5–40)
WBC NRBC COR # BLD: 6.48 10*3/MM3 (ref 3.4–10.8)

## 2023-10-03 PROCEDURE — 85027 COMPLETE CBC AUTOMATED: CPT

## 2023-10-03 PROCEDURE — 80053 COMPREHEN METABOLIC PANEL: CPT

## 2023-10-03 PROCEDURE — 80061 LIPID PANEL: CPT

## 2023-10-03 PROCEDURE — 82306 VITAMIN D 25 HYDROXY: CPT

## 2023-10-03 PROCEDURE — 36415 COLL VENOUS BLD VENIPUNCTURE: CPT

## 2023-10-03 PROCEDURE — 83540 ASSAY OF IRON: CPT

## 2023-10-03 PROCEDURE — 84466 ASSAY OF TRANSFERRIN: CPT

## 2023-10-03 RX ORDER — ALBUTEROL SULFATE 90 UG/1
2 AEROSOL, METERED RESPIRATORY (INHALATION) 4 TIMES DAILY PRN
Qty: 54 G | Refills: 1 | Status: SHIPPED | OUTPATIENT
Start: 2023-10-03

## 2023-10-03 NOTE — TELEPHONE ENCOUNTER
Called patient to schedule for EGD per Dr. Koehler, No answer and patients voicemail box was full. I am sending patient a [employer] message to have her contact the office.
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
## 2023-10-03 NOTE — LETTER
October 3, 2023     Patient: Marcela Gutierrez   YOB: 1970   Date of Visit: 10/3/2023       To Whom It May Concern:    It is my medical opinion that Marcela Gutierrez is restricted from climbing ladder's due to being a high fall risk patient. No other restrictions at this time.            Sincerely,        Madelin Espinal MD

## 2023-10-05 PROBLEM — K22.2 ESOPHAGEAL STRICTURE: Status: ACTIVE | Noted: 2023-10-05

## 2023-10-05 PROBLEM — R13.10 DYSPHAGIA: Status: ACTIVE | Noted: 2023-10-05

## 2023-10-05 PROBLEM — K21.9 GASTROESOPHAGEAL REFLUX DISEASE: Status: ACTIVE | Noted: 2023-10-05

## 2023-10-18 NOTE — NURSING NOTE
Patient r/s appt to 12/3 at 9:30am. I acknowledged. Call ended well.    Pre-op call complete. Instructions and arrival time given. Arrival time changed from 12 to 11:30am

## 2023-10-27 ENCOUNTER — HOSPITAL ENCOUNTER (OUTPATIENT)
Facility: HOSPITAL | Age: 53
Setting detail: HOSPITAL OUTPATIENT SURGERY
Discharge: HOME | End: 2023-10-27
Attending: INTERNAL MEDICINE | Admitting: INTERNAL MEDICINE
Payer: MEDICARE

## 2023-10-27 ENCOUNTER — ANESTHESIA EVENT (OUTPATIENT)
Dept: GASTROENTEROLOGY | Facility: HOSPITAL | Age: 53
End: 2023-10-27
Payer: MEDICARE

## 2023-10-27 ENCOUNTER — ANESTHESIA (OUTPATIENT)
Dept: GASTROENTEROLOGY | Facility: HOSPITAL | Age: 53
End: 2023-10-27
Payer: MEDICARE

## 2023-10-27 VITALS
TEMPERATURE: 97 F | DIASTOLIC BLOOD PRESSURE: 97 MMHG | HEART RATE: 62 BPM | BODY MASS INDEX: 40.86 KG/M2 | SYSTOLIC BLOOD PRESSURE: 140 MMHG | RESPIRATION RATE: 20 BRPM | OXYGEN SATURATION: 99 % | WEIGHT: 230.6 LBS

## 2023-10-27 DIAGNOSIS — K21.9 GASTROESOPHAGEAL REFLUX DISEASE, UNSPECIFIED WHETHER ESOPHAGITIS PRESENT: ICD-10-CM

## 2023-10-27 DIAGNOSIS — K92.0 HEMATEMESIS WITHOUT NAUSEA: Primary | ICD-10-CM

## 2023-10-27 DIAGNOSIS — R13.10 DYSPHAGIA, UNSPECIFIED TYPE: ICD-10-CM

## 2023-10-27 DIAGNOSIS — K22.2 ESOPHAGEAL STRICTURE: ICD-10-CM

## 2023-10-27 PROCEDURE — C1726 CATH, BAL DIL, NON-VASCULAR: HCPCS | Performed by: INTERNAL MEDICINE

## 2023-10-27 PROCEDURE — 43239 EGD BIOPSY SINGLE/MULTIPLE: CPT | Performed by: INTERNAL MEDICINE

## 2023-10-27 PROCEDURE — 25810000003 LACTATED RINGERS PER 1000 ML: Performed by: NURSE ANESTHETIST, CERTIFIED REGISTERED

## 2023-10-27 PROCEDURE — 25010000002 PROPOFOL 10 MG/ML EMULSION: Performed by: NURSE ANESTHETIST, CERTIFIED REGISTERED

## 2023-10-27 PROCEDURE — 25810000003 LACTATED RINGERS PER 1000 ML

## 2023-10-27 PROCEDURE — 43245 EGD DILATE STRICTURE: CPT | Performed by: INTERNAL MEDICINE

## 2023-10-27 PROCEDURE — 88305 TISSUE EXAM BY PATHOLOGIST: CPT | Performed by: INTERNAL MEDICINE

## 2023-10-27 RX ORDER — SODIUM CHLORIDE, SODIUM LACTATE, POTASSIUM CHLORIDE, CALCIUM CHLORIDE 600; 310; 30; 20 MG/100ML; MG/100ML; MG/100ML; MG/100ML
30 INJECTION, SOLUTION INTRAVENOUS CONTINUOUS
Status: DISCONTINUED | OUTPATIENT
Start: 2023-10-27 | End: 2023-10-27 | Stop reason: HOSPADM

## 2023-10-27 RX ORDER — SODIUM CHLORIDE, SODIUM LACTATE, POTASSIUM CHLORIDE, CALCIUM CHLORIDE 600; 310; 30; 20 MG/100ML; MG/100ML; MG/100ML; MG/100ML
INJECTION, SOLUTION INTRAVENOUS CONTINUOUS PRN
Status: DISCONTINUED | OUTPATIENT
Start: 2023-10-27 | End: 2023-10-27 | Stop reason: SURG

## 2023-10-27 RX ORDER — PROPOFOL 10 MG/ML
VIAL (ML) INTRAVENOUS AS NEEDED
Status: DISCONTINUED | OUTPATIENT
Start: 2023-10-27 | End: 2023-10-27 | Stop reason: SURG

## 2023-10-27 RX ORDER — LIDOCAINE HYDROCHLORIDE 20 MG/ML
INJECTION, SOLUTION EPIDURAL; INFILTRATION; INTRACAUDAL; PERINEURAL AS NEEDED
Status: DISCONTINUED | OUTPATIENT
Start: 2023-10-27 | End: 2023-10-27 | Stop reason: SURG

## 2023-10-27 RX ADMIN — SODIUM CHLORIDE, POTASSIUM CHLORIDE, SODIUM LACTATE AND CALCIUM CHLORIDE 30 ML/HR: 600; 310; 30; 20 INJECTION, SOLUTION INTRAVENOUS at 10:04

## 2023-10-27 RX ADMIN — PROPOFOL 200 MCG/KG/MIN: 10 INJECTION, EMULSION INTRAVENOUS at 11:16

## 2023-10-27 RX ADMIN — SODIUM CHLORIDE, POTASSIUM CHLORIDE, SODIUM LACTATE AND CALCIUM CHLORIDE: 600; 310; 30; 20 INJECTION, SOLUTION INTRAVENOUS at 11:05

## 2023-10-27 RX ADMIN — PROPOFOL 100 MG: 10 INJECTION, EMULSION INTRAVENOUS at 11:16

## 2023-10-27 RX ADMIN — LIDOCAINE HYDROCHLORIDE 70 MG: 20 INJECTION, SOLUTION EPIDURAL; INFILTRATION; INTRACAUDAL; PERINEURAL at 11:16
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
## 2023-10-27 NOTE — ANESTHESIA POSTPROCEDURE EVALUATION
Patient: Marcela Gutierrez    Procedure Summary       Date: 10/27/23 Room / Location: [facility] ENDOSCOPY 2 / [facility] ENDOSCOPY    Anesthesia Start: 1113 Anesthesia Stop: 1138    Procedure: ESOPHAGOGASTRODUODENOSCOPY WITH BIOPSIES/ESOPHAGEAL BALLOON DILATATION 15-18 Diagnosis:       Esophageal stricture      Dysphagia, unspecified type      Gastroesophageal reflux disease, unspecified whether esophagitis present      (Esophageal stricture [K22.2])      (Dysphagia, unspecified type [R13.10])      (Gastroesophageal reflux disease, unspecified whether esophagitis present [K21.9])    Surgeons: Awais Koehler MD Provider: Al Beck CRNA    Anesthesia Type: general ASA Status: 3            Anesthesia Type: general    Vitals  Vitals Value Taken Time   /76 10/27/23 1208   Temp 36.1 °C (97 °F) 10/27/23 1145   Pulse 67 10/27/23 1209   Resp 20 10/27/23 1205   SpO2 100 % 10/27/23 1209   Vitals shown include unfiled device data.        Post Anesthesia Care and Evaluation    Patient location during evaluation: bedside  Patient participation: complete - patient participated  Level of consciousness: awake  Pain management: adequate    Airway patency: patent  Anesthetic complications: No anesthetic complications  PONV Status: controlled  Cardiovascular status: acceptable and stable  Respiratory status: acceptable

## 2023-10-30 LAB
CYTO UR: NORMAL
LAB AP CASE REPORT: NORMAL
LAB AP CLINICAL INFORMATION: NORMAL
PATH REPORT.FINAL DX SPEC: NORMAL
PATH REPORT.GROSS SPEC: NORMAL

## 2023-12-26 ENCOUNTER — TELEPHONE (OUTPATIENT)
Dept: FAMILY MEDICINE CLINIC | Age: 53
End: 2023-12-26
Payer: MEDICARE

## 2023-12-26 NOTE — TELEPHONE ENCOUNTER
Pt is calling with [facility]A , she has a cough since thanksgiving  since d/c . She had aspiration Pneumonia but she had her esophagus  stretched twice and she is having insurance issues and she called her insurance and there are no urgent cares in the area . I advised that she go to the er

## 2023-12-27 RX ORDER — TOPIRAMATE 100 MG/1
TABLET, FILM COATED ORAL
Qty: 90 TABLET | Refills: 3 | Status: SHIPPED | OUTPATIENT
Start: 2023-12-27

## 2024-01-29 ENCOUNTER — OFFICE VISIT (OUTPATIENT)
Dept: FAMILY MEDICINE CLINIC | Age: 54
End: 2024-01-29
Payer: COMMERCIAL

## 2024-01-29 ENCOUNTER — OFFICE VISIT (OUTPATIENT)
Dept: GASTROENTEROLOGY | Facility: CLINIC | Age: 54
End: 2024-01-29
Payer: COMMERCIAL

## 2024-01-29 ENCOUNTER — HOSPITAL ENCOUNTER (OUTPATIENT)
Dept: GENERAL RADIOLOGY | Facility: HOSPITAL | Age: 54
Discharge: HOME | End: 2024-01-29
Admitting: NURSE PRACTITIONER
Payer: COMMERCIAL

## 2024-01-29 VITALS
HEART RATE: 78 BPM | HEIGHT: 63 IN | OXYGEN SATURATION: 99 % | DIASTOLIC BLOOD PRESSURE: 78 MMHG | BODY MASS INDEX: 41.75 KG/M2 | TEMPERATURE: 98.2 F | SYSTOLIC BLOOD PRESSURE: 123 MMHG | WEIGHT: 235.6 LBS

## 2024-01-29 VITALS
BODY MASS INDEX: 41.82 KG/M2 | OXYGEN SATURATION: 100 % | SYSTOLIC BLOOD PRESSURE: 149 MMHG | HEART RATE: 73 BPM | HEIGHT: 63 IN | WEIGHT: 236 LBS | DIASTOLIC BLOOD PRESSURE: 97 MMHG

## 2024-01-29 DIAGNOSIS — M25.561 ACUTE PAIN OF RIGHT KNEE: Primary | ICD-10-CM

## 2024-01-29 DIAGNOSIS — K62.5 RECTAL BLEEDING: ICD-10-CM

## 2024-01-29 DIAGNOSIS — K64.9 HEMORRHOIDS, UNSPECIFIED HEMORRHOID TYPE: Primary | ICD-10-CM

## 2024-01-29 DIAGNOSIS — M25.561 ACUTE PAIN OF RIGHT KNEE: ICD-10-CM

## 2024-01-29 DIAGNOSIS — Z80.0 FAMILY HISTORY OF COLON CANCER: ICD-10-CM

## 2024-01-29 DIAGNOSIS — K21.9 GASTROESOPHAGEAL REFLUX DISEASE, UNSPECIFIED WHETHER ESOPHAGITIS PRESENT: ICD-10-CM

## 2024-01-29 PROCEDURE — 3077F SYST BP >= 140 MM HG: CPT | Performed by: NURSE PRACTITIONER

## 2024-01-29 PROCEDURE — 1159F MED LIST DOCD IN RCRD: CPT | Performed by: NURSE PRACTITIONER

## 2024-01-29 PROCEDURE — 1160F RVW MEDS BY RX/DR IN RCRD: CPT | Performed by: NURSE PRACTITIONER

## 2024-01-29 PROCEDURE — 3080F DIAST BP >= 90 MM HG: CPT | Performed by: NURSE PRACTITIONER

## 2024-01-29 PROCEDURE — 3078F DIAST BP <80 MM HG: CPT | Performed by: NURSE PRACTITIONER

## 2024-01-29 PROCEDURE — 99213 OFFICE O/P EST LOW 20 MIN: CPT | Performed by: NURSE PRACTITIONER

## 2024-01-29 PROCEDURE — 3074F SYST BP LT 130 MM HG: CPT | Performed by: NURSE PRACTITIONER

## 2024-01-29 PROCEDURE — 99214 OFFICE O/P EST MOD 30 MIN: CPT | Performed by: NURSE PRACTITIONER

## 2024-01-29 PROCEDURE — 73562 X-RAY EXAM OF KNEE 3: CPT

## 2024-01-29 RX ORDER — CODEINE PHOSPHATE AND GUAIFENESIN 10; 100 MG/5ML; MG/5ML
5 SOLUTION ORAL 3 TIMES DAILY PRN
COMMUNITY

## 2024-01-29 RX ORDER — SODIUM, POTASSIUM,MAG SULFATES 17.5-3.13G
2 SOLUTION, RECONSTITUTED, ORAL ORAL EVERY 12 HOURS
Qty: 354 ML | Refills: 0 | Status: SHIPPED | OUTPATIENT
Start: 2024-01-29

## 2024-01-29 RX ORDER — HYDROCORTISONE 25 MG/G
CREAM TOPICAL 2 TIMES DAILY
Qty: 28 G | Refills: 1 | Status: SHIPPED | OUTPATIENT
Start: 2024-01-29 | End: 2024-02-12

## 2024-01-29 RX ORDER — DICLOFENAC SODIUM AND MISOPROSTOL 75; 200 MG/1; UG/1
1 TABLET, DELAYED RELEASE ORAL 2 TIMES DAILY
COMMUNITY

## 2024-01-29 RX ORDER — METHOCARBAMOL 500 MG/1
500 TABLET, FILM COATED ORAL 4 TIMES DAILY
Qty: 40 TABLET | Refills: 1 | Status: SHIPPED | OUTPATIENT
Start: 2024-01-29

## 2024-01-29 NOTE — PROGRESS NOTES
"Chief Complaint  Back Pain (Sx started several months ago ) and Foot Pain    Subjective    Patient is in today with complaints of right knee pain.  She reports that she was carrying a tote into a building about a month ago when she stepped on the lip of the door, and her knee twisted.  She denies falling.  She reports that she has seen an orthopedic doctor in the past for her knees, and was doing injections, physical therapy, massage, and medications and was told that she would need surgery.  Surgery got put on back burner due to her being sick recently.  Currently she reports that the pain is worse in the morning with a lot of stiffness, and popping when she gets up that radiates into her right hip.  She reports that the pain gets better when she walks for a while.  She has tried heat, muscle relaxer, and diclofenac with no relief.        Marcela Gutierrez presents to [facility]  History of Present Illness    Objective   Vital Signs:  /78 (BP Location: Right arm, Patient Position: Sitting, Cuff Size: Large Adult)   Pulse 78   Temp 98.2 °F (36.8 °C) (Temporal)   Ht 160 cm (62.99\")   Wt 107 kg (235 lb 9.6 oz)   SpO2 99% Comment: room air  BMI 41.75 kg/m²   Estimated body mass index is 41.75 kg/m² as calculated from the following:    Height as of this encounter: 160 cm (62.99\").    Weight as of this encounter: 107 kg (235 lb 9.6 oz).             Physical Exam  HENT:      Head: Normocephalic.   Cardiovascular:      Rate and Rhythm: Normal rate.   Pulmonary:      Effort: Pulmonary effort is normal.   Musculoskeletal:      Right knee: Swelling and crepitus present. No bony tenderness. Tenderness present. Normal pulse.   Skin:     General: Skin is warm and dry.   Neurological:      Mental Status: She is alert and oriented to person, place, and time.   Psychiatric:         Mood and Affect: Mood normal.        Result Review :                     Assessment and Plan     Diagnoses " and all orders for this visit:    1. Acute pain of right knee (Primary)  -     XR Knee 3 View Right; Future  -     methocarbamol (ROBAXIN) 500 MG tablet; Take 1 tablet by mouth 4 (Four) Times a Day.  Dispense: 40 tablet; Refill: 1  -     Diclofenac Sodium (VOLTAREN) 1 % gel gel; Apply 4 g topically to the appropriate area as directed 4 (Four) Times a Day As Needed (Right knee pain).  Dispense: 350 g; Refill: 1  -     Ambulatory Referral to Orthopedic Surgery    Will obtain x-rays today related to duration of symptoms and swelling.  Rest, ice, compression, and elevation.  Will move forward with getting patient back into orthopedic specialist related to chronic nature of symptoms.         Follow Up     Return if symptoms worsen or fail to improve.  Patient was given instructions and counseling regarding her condition or for health maintenance advice. Please see specific information pulled into the AVS if appropriate.
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
## 2024-01-30 RX ORDER — ESOMEPRAZOLE MAGNESIUM 40 MG/1
40 CAPSULE, DELAYED RELEASE ORAL
Qty: 90 CAPSULE | Refills: 1 | Status: SHIPPED | OUTPATIENT
Start: 2024-01-30

## 2024-02-09 ENCOUNTER — TELEPHONE (OUTPATIENT)
Dept: FAMILY MEDICINE CLINIC | Age: 54
End: 2024-02-09
Payer: COMMERCIAL

## 2024-02-09 RX ORDER — DICLOFENAC SODIUM AND MISOPROSTOL 75; 200 MG/1; UG/1
1 TABLET, DELAYED RELEASE ORAL 2 TIMES DAILY
Status: CANCELLED | OUTPATIENT
Start: 2024-02-09

## 2024-02-09 RX ORDER — ESOMEPRAZOLE MAGNESIUM 40 MG/1
40 CAPSULE, DELAYED RELEASE ORAL
Qty: 90 CAPSULE | Refills: 1 | Status: CANCELLED | OUTPATIENT
Start: 2024-02-09

## 2024-02-09 RX ORDER — HYDROCHLOROTHIAZIDE 25 MG/1
25 TABLET ORAL DAILY
Qty: 90 TABLET | Refills: 0 | Status: CANCELLED | OUTPATIENT
Start: 2024-02-09

## 2024-02-09 RX ORDER — METOPROLOL TARTRATE 50 MG/1
50 TABLET, FILM COATED ORAL DAILY
Qty: 90 TABLET | Refills: 0 | Status: CANCELLED | OUTPATIENT
Start: 2024-02-09

## 2024-02-09 RX ORDER — TOPIRAMATE 100 MG/1
100 TABLET, FILM COATED ORAL DAILY
Qty: 90 TABLET | Refills: 3 | Status: CANCELLED | OUTPATIENT
Start: 2024-02-09

## 2024-02-12 RX ORDER — METOPROLOL TARTRATE 50 MG/1
50 TABLET, FILM COATED ORAL DAILY
Qty: 90 TABLET | Refills: 1 | Status: SHIPPED | OUTPATIENT
Start: 2024-02-12

## 2024-02-12 RX ORDER — TOPIRAMATE 100 MG/1
100 TABLET, FILM COATED ORAL DAILY
Qty: 90 TABLET | Refills: 1 | Status: SHIPPED | OUTPATIENT
Start: 2024-02-12

## 2024-02-12 RX ORDER — HYDROCHLOROTHIAZIDE 25 MG/1
25 TABLET ORAL DAILY
Qty: 90 TABLET | Refills: 1 | Status: SHIPPED | OUTPATIENT
Start: 2024-02-12

## 2024-02-12 RX ORDER — MULTIPLE VITAMINS W/ MINERALS TAB 9MG-400MCG
1 TAB ORAL DAILY
Qty: 90 TABLET | Refills: 3 | Status: SHIPPED | OUTPATIENT
Start: 2024-02-12

## 2024-02-12 RX ORDER — ESOMEPRAZOLE MAGNESIUM 40 MG/1
40 CAPSULE, DELAYED RELEASE ORAL
Qty: 90 CAPSULE | Refills: 1 | Status: SHIPPED | OUTPATIENT
Start: 2024-02-12

## 2024-02-23 ENCOUNTER — ANESTHESIA EVENT (OUTPATIENT)
Dept: GASTROENTEROLOGY | Facility: HOSPITAL | Age: 54
End: 2024-02-23
Payer: COMMERCIAL

## 2024-02-23 NOTE — ANESTHESIA PREPROCEDURE EVALUATION
Anesthesia Evaluation     Patient summary reviewed and Nursing notes reviewed   NPO Solid Status: > 8 hours  NPO Liquid Status: > 2 hours           Airway   Mallampati: II  TM distance: >3 FB  Neck ROM: full  No difficulty expected  Dental - normal exam     Pulmonary - normal exam    breath sounds clear to auscultation  (+) pneumonia resolved , asthma,shortness of breath, sleep apnea  Cardiovascular - normal exam  Exercise tolerance: good (4-7 METS)    ECG reviewed  Patient on routine beta blocker  Rhythm: regular  Rate: normal    (+) hypertension well controlled, hyperlipidemia    ROS comment: ECHO 5/1/2020    1.  MITRAL VALVE:       Normal.    2.  AORTIC VALVE:       Normal.    3.  TRICUSPID VALVE:    Normal.    4.  PULMONIC VALVE:     Not well visualized.    5.  AORTIC ROOT:        Normal in size.    6.  LEFT ATRIUM:        Enlarged.    7.  LEFT VENTRICLE:     Normal in size.  There is mild left ventricular        hypertrophy. Overall left ventricular systolic function is adequate,        ejection fraction around 60%.    8.  RIGHT VENTRICLE:    Normal.    9.  RIGHT ATRIUM:       Normal.    10. PERICARDIUM:        Unremarkable.        Neuro/Psych  (+) seizures well controlled, CVA, headaches, dizziness/light headedness, psychiatric history Depression    ROS Comment:  shunt  GI/Hepatic/Renal/Endo    (+) obesity, morbid obesity, GERD, GI bleeding lower resolved, liver disease fatty liver disease    Musculoskeletal     (+) myalgias  Abdominal    Substance History      OB/GYN          Other   arthritis,     ROS/Med Hx Other: Last seizure 18 years ago - S/P  Shunt                    Anesthesia Plan    ASA 3     general   total IV anesthesia  (Total IV Anesthesia    Patient understands anesthesia not responsible for dental damage.  )  intravenous induction     Anesthetic plan, risks, benefits, and alternatives have been provided, discussed and informed consent has been obtained with: patient and  sibling.  Pre-procedure education provided  Plan discussed with CRNA.        CODE STATUS:

## 2024-02-26 ENCOUNTER — HOSPITAL ENCOUNTER (OUTPATIENT)
Dept: MAMMOGRAPHY | Facility: HOSPITAL | Age: 54
Discharge: HOME | End: 2024-02-26
Payer: COMMERCIAL

## 2024-02-26 ENCOUNTER — ANESTHESIA (OUTPATIENT)
Dept: GASTROENTEROLOGY | Facility: HOSPITAL | Age: 54
End: 2024-02-26
Payer: COMMERCIAL

## 2024-02-26 ENCOUNTER — HOSPITAL ENCOUNTER (OUTPATIENT)
Facility: HOSPITAL | Age: 54
Setting detail: HOSPITAL OUTPATIENT SURGERY
Discharge: HOME | End: 2024-02-26
Attending: INTERNAL MEDICINE | Admitting: INTERNAL MEDICINE
Payer: COMMERCIAL

## 2024-02-26 ENCOUNTER — HOSPITAL ENCOUNTER (OUTPATIENT)
Dept: BONE DENSITY | Facility: HOSPITAL | Age: 54
Discharge: HOME | End: 2024-02-26
Payer: COMMERCIAL

## 2024-02-26 VITALS
SYSTOLIC BLOOD PRESSURE: 121 MMHG | HEART RATE: 66 BPM | OXYGEN SATURATION: 100 % | RESPIRATION RATE: 15 BRPM | TEMPERATURE: 98.7 F | DIASTOLIC BLOOD PRESSURE: 73 MMHG

## 2024-02-26 DIAGNOSIS — K62.5 RECTAL BLEEDING: ICD-10-CM

## 2024-02-26 DIAGNOSIS — K21.9 GASTROESOPHAGEAL REFLUX DISEASE, UNSPECIFIED WHETHER ESOPHAGITIS PRESENT: ICD-10-CM

## 2024-02-26 DIAGNOSIS — Z80.0 FAMILY HISTORY OF COLON CANCER: ICD-10-CM

## 2024-02-26 DIAGNOSIS — Z12.31 ENCOUNTER FOR SCREENING MAMMOGRAM FOR BREAST CANCER: ICD-10-CM

## 2024-02-26 DIAGNOSIS — Z78.0 POSTMENOPAUSAL STATE: ICD-10-CM

## 2024-02-26 DIAGNOSIS — K64.9 HEMORRHOIDS, UNSPECIFIED HEMORRHOID TYPE: ICD-10-CM

## 2024-02-26 PROCEDURE — 25810000003 LACTATED RINGERS PER 1000 ML

## 2024-02-26 PROCEDURE — 77080 DXA BONE DENSITY AXIAL: CPT

## 2024-02-26 PROCEDURE — 77067 SCR MAMMO BI INCL CAD: CPT

## 2024-02-26 PROCEDURE — 25010000002 PROPOFOL 10 MG/ML EMULSION: Performed by: NURSE ANESTHETIST, CERTIFIED REGISTERED

## 2024-02-26 PROCEDURE — 77063 BREAST TOMOSYNTHESIS BI: CPT

## 2024-02-26 RX ORDER — LIDOCAINE HYDROCHLORIDE 20 MG/ML
INJECTION, SOLUTION EPIDURAL; INFILTRATION; INTRACAUDAL; PERINEURAL AS NEEDED
Status: DISCONTINUED | OUTPATIENT
Start: 2024-02-26 | End: 2024-02-26 | Stop reason: SURG

## 2024-02-26 RX ORDER — HYDROCORTISONE ACETATE 25 MG/1
25 SUPPOSITORY RECTAL 2 TIMES DAILY
Qty: 12 SUPPOSITORY | Refills: 1 | Status: SHIPPED | OUTPATIENT
Start: 2024-02-26 | End: 2024-03-03

## 2024-02-26 RX ORDER — PROPOFOL 10 MG/ML
VIAL (ML) INTRAVENOUS AS NEEDED
Status: DISCONTINUED | OUTPATIENT
Start: 2024-02-26 | End: 2024-02-26 | Stop reason: SURG

## 2024-02-26 RX ORDER — SODIUM CHLORIDE, SODIUM LACTATE, POTASSIUM CHLORIDE, CALCIUM CHLORIDE 600; 310; 30; 20 MG/100ML; MG/100ML; MG/100ML; MG/100ML
30 INJECTION, SOLUTION INTRAVENOUS CONTINUOUS
Status: DISCONTINUED | OUTPATIENT
Start: 2024-02-26 | End: 2024-02-26 | Stop reason: HOSPADM

## 2024-02-26 RX ADMIN — SODIUM CHLORIDE, POTASSIUM CHLORIDE, SODIUM LACTATE AND CALCIUM CHLORIDE 30 ML/HR: 600; 310; 30; 20 INJECTION, SOLUTION INTRAVENOUS at 13:04

## 2024-02-26 RX ADMIN — LIDOCAINE HYDROCHLORIDE 100 MG: 20 INJECTION, SOLUTION INTRAVENOUS at 14:06

## 2024-02-26 RX ADMIN — PROPOFOL 50 MG: 10 INJECTION, EMULSION INTRAVENOUS at 14:06

## 2024-02-26 RX ADMIN — PROPOFOL 175 MCG/KG/MIN: 10 INJECTION, EMULSION INTRAVENOUS at 14:07

## 2024-02-26 NOTE — ANESTHESIA POSTPROCEDURE EVALUATION
Patient: Marcela Gutierrez    Procedure Summary       Date: 02/26/24 Room / Location: [facility] ENDOSCOPY 2 / [facility] ENDOSCOPY    Anesthesia Start: 1405 Anesthesia Stop: 1430    Procedure: COLONOSCOPY Diagnosis:       Hemorrhoids, unspecified hemorrhoid type      Rectal bleeding      Family history of colon cancer      Gastroesophageal reflux disease, unspecified whether esophagitis present      (Hemorrhoids, unspecified hemorrhoid type [K64.9])      (Rectal bleeding [K62.5])      (Family history of colon cancer [Z80.0])      (Gastroesophageal reflux disease, unspecified whether esophagitis present [K21.9])    Surgeons: Awais Koehler MD Provider: Raiza Mccallum CRNA    Anesthesia Type: general ASA Status: 3            Anesthesia Type: general    Vitals  Vitals Value Taken Time   /73 02/26/24 1444   Temp 37.1 °C (98.7 °F) 02/26/24 1444   Pulse 66 02/26/24 1444   Resp 15 02/26/24 1444   SpO2 100 % 02/26/24 1444           Anesthesia Post Evaluation

## 2024-02-27 ENCOUNTER — TELEPHONE (OUTPATIENT)
Dept: GASTROENTEROLOGY | Facility: CLINIC | Age: 54
End: 2024-02-27
Payer: COMMERCIAL

## 2024-02-27 NOTE — TELEPHONE ENCOUNTER
I have reviewed the patients colonoscopy report. The report showed a normal colonoscopy with internal hemorrhoids.  No polyps removed or specimens collected.  Repeat colonoscopy in 5 years. Please place in recall. Send letter.

## 2024-03-12 ENCOUNTER — TELEPHONE (OUTPATIENT)
Dept: GASTROENTEROLOGY | Facility: CLINIC | Age: 54
End: 2024-03-12
Payer: COMMERCIAL

## 2024-03-12 DIAGNOSIS — K64.9 HEMORRHOIDS, UNSPECIFIED HEMORRHOID TYPE: Primary | ICD-10-CM

## 2024-03-12 RX ORDER — HYDROCORTISONE 25 MG/G
CREAM TOPICAL 2 TIMES DAILY
Qty: 28 G | Refills: 1 | Status: SHIPPED | OUTPATIENT
Start: 2024-03-12 | End: 2024-04-01

## 2024-03-12 NOTE — TELEPHONE ENCOUNTER
Insurance denied hydrocortisone suppositories after prior authorization. Per Anjelica Cherry, change to hydrocortisone cream with applicator. I have spoken with patient. She is aware we will send this to [facility] pharmacy. Pt agreeable to  rx.

## 2024-04-01 ENCOUNTER — PATIENT ROUNDING (BHMG ONLY) (OUTPATIENT)
Dept: ORTHOPEDIC SURGERY | Facility: CLINIC | Age: 54
End: 2024-04-01
Payer: COMMERCIAL

## 2024-04-01 ENCOUNTER — OFFICE VISIT (OUTPATIENT)
Dept: ORTHOPEDIC SURGERY | Facility: CLINIC | Age: 54
End: 2024-04-01
Payer: COMMERCIAL

## 2024-04-01 VITALS
DIASTOLIC BLOOD PRESSURE: 87 MMHG | HEIGHT: 63 IN | OXYGEN SATURATION: 95 % | SYSTOLIC BLOOD PRESSURE: 160 MMHG | BODY MASS INDEX: 41.64 KG/M2 | WEIGHT: 235 LBS | HEART RATE: 105 BPM

## 2024-04-01 DIAGNOSIS — M17.0 BILATERAL PRIMARY OSTEOARTHRITIS OF KNEE: Primary | ICD-10-CM

## 2024-04-01 PROCEDURE — 1159F MED LIST DOCD IN RCRD: CPT | Performed by: STUDENT IN AN ORGANIZED HEALTH CARE EDUCATION/TRAINING PROGRAM

## 2024-04-01 PROCEDURE — 20610 DRAIN/INJ JOINT/BURSA W/O US: CPT | Performed by: STUDENT IN AN ORGANIZED HEALTH CARE EDUCATION/TRAINING PROGRAM

## 2024-04-01 PROCEDURE — 1160F RVW MEDS BY RX/DR IN RCRD: CPT | Performed by: STUDENT IN AN ORGANIZED HEALTH CARE EDUCATION/TRAINING PROGRAM

## 2024-04-01 PROCEDURE — 99203 OFFICE O/P NEW LOW 30 MIN: CPT | Performed by: STUDENT IN AN ORGANIZED HEALTH CARE EDUCATION/TRAINING PROGRAM

## 2024-04-01 PROCEDURE — 3077F SYST BP >= 140 MM HG: CPT | Performed by: STUDENT IN AN ORGANIZED HEALTH CARE EDUCATION/TRAINING PROGRAM

## 2024-04-01 PROCEDURE — 3079F DIAST BP 80-89 MM HG: CPT | Performed by: STUDENT IN AN ORGANIZED HEALTH CARE EDUCATION/TRAINING PROGRAM

## 2024-04-01 RX ORDER — MELOXICAM 7.5 MG/1
7.5 TABLET ORAL DAILY
Qty: 30 TABLET | Refills: 1 | Status: SHIPPED | OUTPATIENT
Start: 2024-04-01

## 2024-04-01 RX ORDER — TRIAMCINOLONE ACETONIDE 40 MG/ML
40 INJECTION, SUSPENSION INTRA-ARTICULAR; INTRAMUSCULAR
Status: COMPLETED | OUTPATIENT
Start: 2024-04-01 | End: 2024-04-01

## 2024-04-01 RX ORDER — LIDOCAINE HYDROCHLORIDE 10 MG/ML
5 INJECTION, SOLUTION EPIDURAL; INFILTRATION; INTRACAUDAL; PERINEURAL
Status: COMPLETED | OUTPATIENT
Start: 2024-04-01 | End: 2024-04-01

## 2024-04-01 RX ADMIN — LIDOCAINE HYDROCHLORIDE 5 ML: 10 INJECTION, SOLUTION EPIDURAL; INFILTRATION; INTRACAUDAL; PERINEURAL at 09:42

## 2024-04-01 RX ADMIN — LIDOCAINE HYDROCHLORIDE 5 ML: 10 INJECTION, SOLUTION EPIDURAL; INFILTRATION; INTRACAUDAL; PERINEURAL at 09:43

## 2024-04-01 RX ADMIN — TRIAMCINOLONE ACETONIDE 40 MG: 40 INJECTION, SUSPENSION INTRA-ARTICULAR; INTRAMUSCULAR at 09:43

## 2024-04-01 RX ADMIN — TRIAMCINOLONE ACETONIDE 40 MG: 40 INJECTION, SUSPENSION INTRA-ARTICULAR; INTRAMUSCULAR at 09:42

## 2024-04-01 NOTE — PROGRESS NOTES
"Chief Complaint  Pain and Initial Evaluation of the Right Knee    Subjective          Marcela Gutierrez presents to [facility] for   History of Present Illness    The patient presents here today for evaluation of the right knee. The patient reports right knee pain. She reports she tweaked her knee lifting a tote. She reports she has been compensating and getting left knee swelling. She is wearing a knee brace on her right knee. She got injections in her knees by pain management. She has lost 100 pounds to help with her knee pain.       Allergies   Allergen Reactions    Green Dyes Unknown - High Severity    Latex Hives    Morphine Hallucinations    Oxycodone-Acetaminophen Hives    Zoloft [Sertraline] Myalgia    Mastisol [Wound Dressing Adhesive] Unknown - High Severity    Adhesive Tape Rash        Social History     Socioeconomic History    Marital status:    Tobacco Use    Smoking status: Never     Passive exposure: Past    Smokeless tobacco: Never   Vaping Use    Vaping status: Never Used   Substance and Sexual Activity    Alcohol use: Yes     Comment: Socially 1 or 2 times a year 2 drink limit    Drug use: Never    Sexual activity: Not Currently     Partners: Male     Birth control/protection: Condom, Hysterectomy        I reviewed the patient's chief complaint, history of present illness, review of systems, past medical history, surgical history, family history, social history, medications, and allergy list.     REVIEW OF SYSTEMS    Constitutional: Denies fevers, chills, weight loss  Cardiovascular: Denies chest pain, shortness of breath  Skin: Denies rashes, acute skin changes  Neurologic: Denies headache, loss of consciousness  MSK: Right knee pain      Objective   Vital Signs:   /87   Pulse 105   Ht 160 cm (63\")   Wt 107 kg (235 lb)   SpO2 95%   BMI 41.63 kg/m²     Body mass index is 41.63 kg/m².    Physical Exam    General: Alert. No acute distress.   Bilateral " knee- ROM -5 to 100 degrees. Positive crepitus. Varus alignment. Medial tenderness. Smooth hip motion. Mild swelling. Positive EHL, FHL, GS and TA. Sensation intact to all 5 nerves of the foot. Positive pulses. Stable to varus/valgus stress. Stable to anterior/posterior drawer. Negative Lachman's. Negative Nain's.       Large Joint Arthrocentesis: R knee  Date/Time: 4/1/2024 9:42 AM  Consent given by: patient  Site marked: site marked  Timeout: Immediately prior to procedure a time out was called to verify the correct patient, procedure, equipment, support staff and site/side marked as required   Supporting Documentation  Indications: pain   Procedure Details  Location: knee - R knee  Needle gauge: 21 G.  Medications administered: 5 mL lidocaine PF 1% 1 %; 40 mg triamcinolone acetonide 40 MG/ML  Patient tolerance: patient tolerated the procedure well with no immediate complications      Large Joint Arthrocentesis: L knee  Date/Time: 4/1/2024 9:43 AM  Consent given by: patient  Site marked: site marked  Timeout: Immediately prior to procedure a time out was called to verify the correct patient, procedure, equipment, support staff and site/side marked as required   Supporting Documentation  Indications: pain   Procedure Details  Location: knee - L knee  Needle gauge: 21 G.  Medications administered: 5 mL lidocaine PF 1% 1 %; 40 mg triamcinolone acetonide 40 MG/ML  Patient tolerance: patient tolerated the procedure well with no immediate complications    This injection documentation was Scribed for Josep Chamberlain MD by Kathya Hernandez.  04/01/24   11:14 EDT      Imaging Results (Most Recent)       None                     Assessment and Plan        No results found.     Diagnoses and all orders for this visit:    1. Bilateral primary osteoarthritis of knee (Primary)        Discussed the treatment plan with the patient.  I reviewed the previous x-rays with the patient. Plan for conservative treatment at this time.  Prescription for mobic given today to replace diclofenac. Home exercises given today. Discussed the risks and benefits of a bilateral knee steroid injection. The patient expressed understanding and wished to proceed. She tolerated the injections well.     Call or return if worsening symptoms.    Scribed for Josep Chamberlain MD by Randi Brownlee  04/01/2024   09:10 EDT         Follow Up     3 months    Patient was given instructions and counseling regarding her condition or for health maintenance advice. Please see specific information pulled into the AVS if appropriate.       I have personally performed the services described in this document as scribed by the above individual and it is both accurate and complete.     Josep Chamberlain MD  04/01/24  09:25 EDT

## 2024-04-01 NOTE — PROGRESS NOTES
A [employer] message has been sent to the patient for PATIENT [place] [facility]

## 2024-05-06 ENCOUNTER — TELEPHONE (OUTPATIENT)
Dept: ORTHOPEDICS | Facility: OTHER | Age: 54
End: 2024-05-06
Payer: COMMERCIAL

## 2024-05-20 ENCOUNTER — OFFICE VISIT (OUTPATIENT)
Dept: ORTHOPEDIC SURGERY | Facility: CLINIC | Age: 54
End: 2024-05-20
Payer: COMMERCIAL

## 2024-05-20 VITALS
BODY MASS INDEX: 41.64 KG/M2 | HEART RATE: 89 BPM | OXYGEN SATURATION: 97 % | WEIGHT: 235 LBS | DIASTOLIC BLOOD PRESSURE: 96 MMHG | HEIGHT: 63 IN | SYSTOLIC BLOOD PRESSURE: 122 MMHG

## 2024-05-20 DIAGNOSIS — M17.12 TRICOMPARTMENT OSTEOARTHRITIS OF LEFT KNEE: ICD-10-CM

## 2024-05-20 DIAGNOSIS — M25.562 LEFT KNEE PAIN, UNSPECIFIED CHRONICITY: Primary | ICD-10-CM

## 2024-05-20 PROCEDURE — 3074F SYST BP LT 130 MM HG: CPT

## 2024-05-20 PROCEDURE — 1160F RVW MEDS BY RX/DR IN RCRD: CPT

## 2024-05-20 PROCEDURE — 99213 OFFICE O/P EST LOW 20 MIN: CPT

## 2024-05-20 PROCEDURE — 3080F DIAST BP >= 90 MM HG: CPT

## 2024-05-20 PROCEDURE — 1159F MED LIST DOCD IN RCRD: CPT

## 2024-05-20 NOTE — PROGRESS NOTES
"Chief Complaint  Follow-up and Pain of the Left Knee and Follow-up and Pain of the Right Knee    Subjective      Marcela Gutierrez presents to [facility] for follow up of her bilateral knees.  Patient was last seen and evaluated in office by Dr. Chamberlain on 4/1/2024.  Patient received bilateral knee steroid injections at that time.  Patient was diagnosed with bilateral osteoarthritis of the knees and decided to treat that conservatively.    Today the patient states her right knee pain is tolerable.  She states occasionally she feels a \"tendon popping\" on the lateral side of her knee but that she has had significant improvement from the previous steroid injection.      She is here today complaining that her left knee is debilitating.  She states that she got 0 relief from the previous injection and her pain is constant.  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  She states her left knee has really started to bother her since her right knee got flared up approximately in November.  She states she has tried physical therapy, arthroscopic guided injections at the knee clinic with this left knee in the past.  She also reports that she has lost over 100 pounds which has helped improve her knee but pain is still prevalent.  Patient reports that she is unable to take oral anti-inflammatories due to GI issues.  Patient has been taking Tylenol as needed with minimal relief.  Her pain is mainly located on the inside of her knee as is her swelling.  She is unable to sleep because the pain is so severe.  She states anytime her knee is in one position such as bent or extended for a long period of time moving it is incredibly painful and she is brought to tears.    Allergies   Allergen Reactions    Green Dyes Unknown - High Severity    Latex Hives    Morphine Hallucinations    " "Oxycodone-Acetaminophen Hives    Zoloft [Sertraline] Myalgia    Mastisol [Wound Dressing Adhesive] Unknown - High Severity    Adhesive Tape Rash       Objective     Vital Signs:   Vitals:    05/20/24 0832   BP: 122/96   Pulse: 89   SpO2: 97%   Weight: 107 kg (235 lb)   Height: 160 cm (63\")     Body mass index is 41.63 kg/m².    I reviewed the patient's chief complaint, history of present illness, review of systems, past medical history, surgical history, family history, social history, medications, and allergy list.     REVIEW OF SYSTEMS    Constitutional: Denies fevers, chills, weight loss  Cardiovascular: Denies chest pain, shortness of breath  Skin: Denies rashes, acute skin changes  Neurologic: Denies headache, loss of consciousness  MSK: Bilateral knee pain.     Ortho Exam  Left lower extremity: Moderate knee effusion.  Tenderness to medial joint line.  Full knee extension.  Crepitus with motion.  Knee flexion 110 degrees.  Knee stable to varus and valgus stress.  Knee extensor mechanism intact.  Calf is soft, nontender.  Demonstrates active ankle dorsiflexion and plantarflexion.  Sensation intact.  Neurovascular intact.  Palpable pedal pulse.          Imaging Results (Most Recent)       Procedure Component Value Units Date/Time    XR Knee 4+ View Left [[id number]] Resulted: 05/20/24 1033     Updated: 05/20/24 1033    Narrative:      X-Ray Report:  Study: X-rays ordered, taken in the office, and reviewed today  Site: Left knee xray  Indication: Left knee pain follow-up  View: AP, lateral, sunrise left knee view(s)  Findings: Severe tricompartmental left knee osteoarthritis visualized with   collapse in the medial joint compartment.  Multiple areas of osteophyte   formation.  No acute fractures.  Prior studies available for comparison: yes                 Assessment and Plan   Diagnoses and all orders for this visit:    1. Left knee pain, unspecified chronicity (Primary)  -     XR Knee 4+ View Left    2. " Tricompartment osteoarthritis of left knee         Marcela Gutierrez presents to [facility] for her bilateral knees.  Patient has bilateral knee osteoarthritis that she been treating conservatively.  Her last office visit was on 4/1/2024 and she received bilateral knee steroid injections.  X-rays were obtained and reviewed with patient today.    We discussed further conservative management for their left knee. This includes but is not limited to - oral NSAIDs, topical NSAIDs, PT/home exercise program, intermittent steroid injections, and/or gel injections. Patient elected to continue conservative management.  Patient is unable to take oral anti-inflammatories therefore topical anti-inflammatories would be recommended.  Additionally, recommended ice/elevation for pain or swelling.  Will plan to submit for authorization for viscosupplementation.      Patient will follow-up after insurance approval of gel injection.      Tobacco Use: Low Risk  (5/20/2024)    Patient History     Smoking Tobacco Use: Never     Smokeless Tobacco Use: Never     Passive Exposure: Past     Patient reports that they are a nonsmoker; cessation education not applicable.     Class 3 Severe Obesity (BMI >=40). Obesity-related health conditions include the following: osteoarthritis. Obesity is improving with lifestyle modifications.  Patient reports she has lost over 100 pounds.  Encouraged patient to continue healthy lifestyle modification for further weight loss.      Follow Up   Return After PA approval for Visco.  There are no Patient Instructions on file for this visit.  Patient was given instructions and counseling regarding her condition or for health maintenance advice. Please see specific information pulled into the AVS if appropriate.       Dictated Utilizing Dragon Dictation. Please note that portions of this note were completed with a voice recognition program. Part of this note may be an electronic  transcription/translation of spoken language to printed text using the Dragon Dictation System.

## 2024-05-24 ENCOUNTER — TELEPHONE (OUTPATIENT)
Dept: ORTHOPEDIC SURGERY | Facility: CLINIC | Age: 54
End: 2024-05-24
Payer: COMMERCIAL

## 2024-05-24 NOTE — TELEPHONE ENCOUNTER
Kathya Hernandez, Serena, [employer] Rep  PLEASE SCHEDULE PATIENT FOR B/L EUFLEXXA INJECTIONS WITH DR DENNIS.  LAST STEROID 04/01/2024. NO PREVIOUS GEL  AUTH SCANNED INTO CHART          Comments    SENT MSG THRU MY CHART:  5-24-24       YOUR APPT WILL BE ON:     DATE:   6-10-24   TIME:   8:30 AM   REASON:  ROSARIO KNEE EUFLEXXA #1 INJECTION   PROVIDER:  DR DENNIS   ADDRESS:  [address] [place] [address]       DATE:   6-17-24   TIME:   8:30 AM   REASON:  ROSARIO KNEE EUFLEXXA #2 INJECTION   PROVIDER:  DR DENNIS   ADDRESS:  [address] [place] [address]       DATE:   6-24-24   TIME:   8:30 AM   REASON:  ROSARIO KNEE EUFLEXXA #3 INJECTION   PROVIDER:  DR DENNIS   ADDRESS:  [address] [place] [address]       LAST ROSARIO KNEE STEROID INJ:  4-1-24       [facility]       IF YOU HAVE ANY QUESTIONS REGARDING YOUR APPOINTMENTS, PLEASE CALL US [phone].  THANKS

## 2024-06-10 ENCOUNTER — HOSPITAL ENCOUNTER (OUTPATIENT)
Dept: GENERAL RADIOLOGY | Facility: HOSPITAL | Age: 54
Discharge: HOME | End: 2024-06-10
Admitting: NURSE PRACTITIONER
Payer: COMMERCIAL

## 2024-06-10 ENCOUNTER — OFFICE VISIT (OUTPATIENT)
Dept: FAMILY MEDICINE CLINIC | Age: 54
End: 2024-06-10
Payer: COMMERCIAL

## 2024-06-10 ENCOUNTER — TELEPHONE (OUTPATIENT)
Dept: FAMILY MEDICINE CLINIC | Age: 54
End: 2024-06-10
Payer: COMMERCIAL

## 2024-06-10 ENCOUNTER — OFFICE VISIT (OUTPATIENT)
Dept: ORTHOPEDIC SURGERY | Facility: CLINIC | Age: 54
End: 2024-06-10
Payer: COMMERCIAL

## 2024-06-10 VITALS
SYSTOLIC BLOOD PRESSURE: 154 MMHG | OXYGEN SATURATION: 98 % | DIASTOLIC BLOOD PRESSURE: 93 MMHG | HEIGHT: 63 IN | WEIGHT: 235 LBS | HEART RATE: 71 BPM | BODY MASS INDEX: 41.64 KG/M2

## 2024-06-10 VITALS
SYSTOLIC BLOOD PRESSURE: 116 MMHG | HEART RATE: 73 BPM | OXYGEN SATURATION: 100 % | HEIGHT: 63 IN | TEMPERATURE: 98.6 F | DIASTOLIC BLOOD PRESSURE: 88 MMHG | BODY MASS INDEX: 41.99 KG/M2 | WEIGHT: 237 LBS

## 2024-06-10 DIAGNOSIS — J45.909 ASTHMA, UNSPECIFIED ASTHMA SEVERITY, UNSPECIFIED WHETHER COMPLICATED, UNSPECIFIED WHETHER PERSISTENT: ICD-10-CM

## 2024-06-10 DIAGNOSIS — R05.3 CHRONIC COUGH: Primary | ICD-10-CM

## 2024-06-10 DIAGNOSIS — R05.3 CHRONIC COUGH: ICD-10-CM

## 2024-06-10 DIAGNOSIS — M17.0 BILATERAL PRIMARY OSTEOARTHRITIS OF KNEE: Primary | ICD-10-CM

## 2024-06-10 PROCEDURE — 3077F SYST BP >= 140 MM HG: CPT | Performed by: STUDENT IN AN ORGANIZED HEALTH CARE EDUCATION/TRAINING PROGRAM

## 2024-06-10 PROCEDURE — 71046 X-RAY EXAM CHEST 2 VIEWS: CPT

## 2024-06-10 PROCEDURE — 99213 OFFICE O/P EST LOW 20 MIN: CPT | Performed by: STUDENT IN AN ORGANIZED HEALTH CARE EDUCATION/TRAINING PROGRAM

## 2024-06-10 PROCEDURE — 1159F MED LIST DOCD IN RCRD: CPT | Performed by: NURSE PRACTITIONER

## 2024-06-10 PROCEDURE — 1159F MED LIST DOCD IN RCRD: CPT | Performed by: STUDENT IN AN ORGANIZED HEALTH CARE EDUCATION/TRAINING PROGRAM

## 2024-06-10 PROCEDURE — 99214 OFFICE O/P EST MOD 30 MIN: CPT | Performed by: NURSE PRACTITIONER

## 2024-06-10 PROCEDURE — 3079F DIAST BP 80-89 MM HG: CPT | Performed by: NURSE PRACTITIONER

## 2024-06-10 PROCEDURE — 1160F RVW MEDS BY RX/DR IN RCRD: CPT | Performed by: STUDENT IN AN ORGANIZED HEALTH CARE EDUCATION/TRAINING PROGRAM

## 2024-06-10 PROCEDURE — 1160F RVW MEDS BY RX/DR IN RCRD: CPT | Performed by: NURSE PRACTITIONER

## 2024-06-10 PROCEDURE — 20610 DRAIN/INJ JOINT/BURSA W/O US: CPT | Performed by: STUDENT IN AN ORGANIZED HEALTH CARE EDUCATION/TRAINING PROGRAM

## 2024-06-10 PROCEDURE — 3074F SYST BP LT 130 MM HG: CPT | Performed by: NURSE PRACTITIONER

## 2024-06-10 PROCEDURE — 3080F DIAST BP >= 90 MM HG: CPT | Performed by: STUDENT IN AN ORGANIZED HEALTH CARE EDUCATION/TRAINING PROGRAM

## 2024-06-10 RX ORDER — HYALURONATE SODIUM 10 MG/ML
20 SYRINGE (ML) INTRAARTICULAR
Status: COMPLETED | OUTPATIENT
Start: 2024-06-10 | End: 2024-06-10

## 2024-06-10 RX ORDER — ALBUTEROL SULFATE 90 UG/1
2 AEROSOL, METERED RESPIRATORY (INHALATION) 4 TIMES DAILY PRN
Qty: 54 G | Refills: 1 | Status: SHIPPED | OUTPATIENT
Start: 2024-06-10

## 2024-06-10 RX ORDER — GUAIFENESIN 600 MG/1
1200 TABLET, EXTENDED RELEASE ORAL 2 TIMES DAILY
Qty: 40 TABLET | Refills: 0 | Status: SHIPPED | OUTPATIENT
Start: 2024-06-10 | End: 2024-06-11

## 2024-06-10 RX ADMIN — Medication 20 MG: at 09:18

## 2024-06-10 NOTE — PROGRESS NOTES
"Chief Complaint  Cough (Sx started several months ago )    Subjective        Marcela Gutierrez presents to [facility]  Cough  This is a chronic problem. Episode onset: Increased over past six months. The problem has been worse. Episode frequency: Increase during PM hours. The cough is Productive of sputum (States sputum is very thick consistent like silicone.). Associated symptoms include heartburn, shortness of breath and wheezing. Pertinent negatives include no chest pain, chills, ear congestion, ear pain, hemoptysis, nasal congestion, rash or rhinorrhea. The symptoms are aggravated by lying down. She has tried rest, OTC cough suppressant, a beta-agonist inhaler and body position changes for the symptoms. The treatment provided mild relief. Her past medical history is significant for asthma. There is no history of COPD, environmental allergies or pneumonia.   SAllyC. presents today to discuss chronic cough that has been present for the past two years and has increased in frequency during the past six months. Reports that she was referred to Dr. Amato in the past and was informed that she has \"Sleep Asthma\", pt states due to unforeseen circumstances she did not return to that provider. Reports current diet modifications include a keto lifestyle adhering to gluten and dairy free. Over the past few weeks she has increased the number of times administering her Albuterol inhaler. States she sleeps on her left side with a bucket close by for when she wakes up coughing on thick sputum to expel in. S.C. directly points to Right Upper Lobe describing primary site of bronchial congestion. Describes audible wheezing during PM hours.     Pt is established with GI and had EGD recently and pt was referred to Bariatric Surgery for further evaluation of prior stomach surgery.  Pt is Rx to Nexium r/t gastritis and r    Objective   Vital Signs:  /88 (BP Location: Left arm, Patient Position: " "Sitting, Cuff Size: Large Adult)   Pulse 73   Temp 98.6 °F (37 °C) (Oral)   Ht 160 cm (63\")   Wt 108 kg (237 lb)   SpO2 100% Comment: room air  BMI 41.98 kg/m²   Estimated body mass index is 41.98 kg/m² as calculated from the following:    Height as of this encounter: 160 cm (63\").    Weight as of this encounter: 108 kg (237 lb).             Physical Exam  Vitals reviewed.   Cardiovascular:      Rate and Rhythm: Normal rate and regular rhythm.   Pulmonary:      Effort: Pulmonary effort is normal. No respiratory distress.      Breath sounds: Normal breath sounds. No stridor. No wheezing, rhonchi or rales.   Chest:      Chest wall: No tenderness.   Abdominal:      General: Abdomen is flat.   Neurological:      General: No focal deficit present.      Mental Status: She is alert and oriented to person, place, and time. Mental status is at baseline.   Psychiatric:         Mood and Affect: Mood normal.         Behavior: Behavior normal. Behavior is cooperative.        Result Review :                     Assessment and Plan     Diagnoses and all orders for this visit:    1. Chronic cough (Primary)  -     Ambulatory Referral to Pulmonology  -     XR Chest PA & Lateral; Future  -     guaiFENesin (Mucinex) 600 MG 12 hr tablet; Take 2 tablets by mouth 2 (Two) Times a Day.  Dispense: 40 tablet; Refill: 0    2. Asthma, unspecified asthma severity, unspecified whether complicated, unspecified whether persistent  Comments:  Her asthma is worse with potential aspiration from her chronic N/V.Will refill her albuterol for her today and new referral to Pumonology for further evaluation  Orders:  -     albuterol sulfate  (90 Base) MCG/ACT inhaler; Inhale 2 puffs 4 (Four) Times a Day As Needed for Wheezing.  Dispense: 54 g; Refill: 1             Follow Up     No follow-ups on file.  Patient was given instructions and counseling regarding her condition or for health maintenance advice. Please see specific information pulled " into the AVS if appropriate.

## 2024-06-10 NOTE — PROGRESS NOTES
"Chief Complaint  Follow-up and Pain of the Left Knee and Follow-up and Pain of the Right Knee    Subjective          Marcela Gutierrez presents to [facility] for follow up of bilateral knees.   History of Present Illness    She is here for bilateral knee pain. Her last injections were 4/1/24.  She has had arthritis for years she has treated conservatively.  She has tried braces in the past for support.  She has lost over 100 pounds which helps with pain relief of the knees. She has pain with prolonged standing, ambulation and stairs.  She has had no recent injury or fall.     Allergies   Allergen Reactions    Green Dyes Unknown - High Severity    Latex Hives    Morphine Hallucinations    Oxycodone-Acetaminophen Hives    Zoloft [Sertraline] Myalgia    Mastisol [Wound Dressing Adhesive] Unknown - High Severity    Adhesive Tape Rash        Social History     Socioeconomic History    Marital status:    Tobacco Use    Smoking status: Never     Passive exposure: Past    Smokeless tobacco: Never   Vaping Use    Vaping status: Never Used   Substance and Sexual Activity    Alcohol use: Yes     Comment: Socially 1 or 2 times a year 2 drink limit    Drug use: Never    Sexual activity: Not Currently     Partners: Male     Birth control/protection: Condom, Hysterectomy        I reviewed the patient's chief complaint, history of present illness, review of systems, past medical history, surgical history, family history, social history, medications, and allergy list.     REVIEW OF SYSTEMS    Constitutional: Denies fevers, chills, weight loss  Cardiovascular: Denies chest pain, shortness of breath  Skin: Denies rashes, acute skin changes  Neurologic: Denies headache, loss of consciousness  MSK: Pain in bilateral knees.       Objective   Vital Signs:   /93   Pulse 71   Ht 160 cm (63\")   Wt 107 kg (235 lb)   SpO2 98%   BMI 41.63 kg/m²     Body mass index is 41.63 kg/m².    Physical " Exam    General: Alert. No acute distress.   Bilateral knee- ROM -5 to 100 degrees. Positive crepitus. Varus alignment. Medial tenderness. Smooth hip motion. Mild swelling. Positive EHL, FHL, GS and TA. Sensation intact to all 5 nerves of the foot. Positive pulses. Stable to varus/valgus stress. Stable to anterior/posterior drawer. Negative Lachman's. Negative Nain's.        Large Joint Arthrocentesis: L knee  Date/Time: 6/10/2024 9:18 AM  Consent given by: patient  Site marked: site marked  Timeout: Immediately prior to procedure a time out was called to verify the correct patient, procedure, equipment, support staff and site/side marked as required   Supporting Documentation  Indications: pain   Procedure Details  Location: knee - L knee  Needle gauge: 21g.  Medications administered: 20 mg Sodium Hyaluronate (Viscosup) 20 MG/2ML  Patient tolerance: patient tolerated the procedure well with no immediate complications      This injection documentation was Scribed for Josep Chamberlain MD by Kassy Walker MA.  06/10/24   09:18 EDT    Imaging Results (Most Recent)       None                     Assessment and Plan        XR Knee 4+ View Left    Result Date: 5/20/2024  Narrative: X-Ray Report: Study: X-rays ordered, taken in the office, and reviewed today Site: Left knee xray Indication: Left knee pain follow-up View: AP, lateral, sunrise left knee view(s) Findings: Severe tricompartmental left knee osteoarthritis visualized with collapse in the medial joint compartment.  Multiple areas of osteophyte formation.  No acute fractures. Prior studies available for comparison: yes       Diagnoses and all orders for this visit:    1. Bilateral primary osteoarthritis of knee (Primary)    Other orders  -     Large Joint Arthrocentesis: L knee        Discussed the treatment plan with the patient. I reviewed the X-rays that were obtained 5/20/24 with the patient.     Discussed the risks and benefits of conservative measures.  The patient expressed understanding and wished to proceed with left knee EUFLEXXA #1 injection.  She tolerated the injection well.     Call or return if worsening symptoms.    Scribed for Josep Chamberlain MD by Cathleen Marte MA  06/10/2024   08:47 EDT         Follow Up     1 week with Euflexxa #2 injection of the left knee.      Patient was given instructions and counseling regarding her condition or for health maintenance advice. Please see specific information pulled into the AVS if appropriate.       I have personally performed the services described in this document as scribed by the above individual and it is both accurate and complete. Josep Chamberlain MD 06/10/24 09:32 EDT

## 2024-06-10 NOTE — TELEPHONE ENCOUNTER
Caller: Marcela Gutierrez    Relationship: Self    Best call back number: [phone]     Which medication are you concerned about: guaiFENesin (Mucinex) 600 MG 12 hr tablet     Who prescribed you this medication: DR. BARRERA- SAW PROVIDER KENNEDI TODAY 6.10.2024    When did you start taking this medication: LONG TIME    What are your concerns: PATIENT REQUESTING LIQUID FORM INSTEAD OF PILL FORM DUE TO PROBLEMS WITH HER ESOPHAGUS.     PATIENT IS OUT OF MEDICATION      PLEASE SEND IN PRESCRIPTION REQUEST TO [id number] - [place], [place] - [address] [facility] [phone] [facility] [phone] FX         MYCHARGALE YES OR CALL MAY LEAVE VOICEMAIL.

## 2024-06-11 RX ORDER — GUAIFENESIN 200 MG/10ML
400 LIQUID ORAL 2 TIMES DAILY
Qty: 236 ML | Refills: 1 | Status: SHIPPED | OUTPATIENT
Start: 2024-06-11 | End: 2024-06-11

## 2024-06-11 RX ORDER — GUAIFENESIN 200 MG/10ML
400 LIQUID ORAL 2 TIMES DAILY
Qty: 236 ML | Refills: 1 | Status: SHIPPED | OUTPATIENT
Start: 2024-06-11

## 2024-06-17 ENCOUNTER — OFFICE VISIT (OUTPATIENT)
Dept: ORTHOPEDIC SURGERY | Facility: CLINIC | Age: 54
End: 2024-06-17
Payer: COMMERCIAL

## 2024-06-17 VITALS
BODY MASS INDEX: 41.99 KG/M2 | DIASTOLIC BLOOD PRESSURE: 85 MMHG | HEIGHT: 63 IN | WEIGHT: 237 LBS | OXYGEN SATURATION: 94 % | SYSTOLIC BLOOD PRESSURE: 144 MMHG | HEART RATE: 79 BPM

## 2024-06-17 DIAGNOSIS — M17.0 BILATERAL PRIMARY OSTEOARTHRITIS OF KNEE: Primary | ICD-10-CM

## 2024-06-17 PROCEDURE — 3079F DIAST BP 80-89 MM HG: CPT | Performed by: STUDENT IN AN ORGANIZED HEALTH CARE EDUCATION/TRAINING PROGRAM

## 2024-06-17 PROCEDURE — 20610 DRAIN/INJ JOINT/BURSA W/O US: CPT | Performed by: STUDENT IN AN ORGANIZED HEALTH CARE EDUCATION/TRAINING PROGRAM

## 2024-06-17 PROCEDURE — 1159F MED LIST DOCD IN RCRD: CPT | Performed by: STUDENT IN AN ORGANIZED HEALTH CARE EDUCATION/TRAINING PROGRAM

## 2024-06-17 PROCEDURE — 1160F RVW MEDS BY RX/DR IN RCRD: CPT | Performed by: STUDENT IN AN ORGANIZED HEALTH CARE EDUCATION/TRAINING PROGRAM

## 2024-06-17 PROCEDURE — 3077F SYST BP >= 140 MM HG: CPT | Performed by: STUDENT IN AN ORGANIZED HEALTH CARE EDUCATION/TRAINING PROGRAM

## 2024-06-17 RX ORDER — HYALURONATE SODIUM 10 MG/ML
20 SYRINGE (ML) INTRAARTICULAR
Status: COMPLETED | OUTPATIENT
Start: 2024-06-17 | End: 2024-06-17

## 2024-06-17 RX ADMIN — Medication 20 MG: at 08:36

## 2024-06-17 NOTE — PROGRESS NOTES
"Chief Complaint  Follow-up and Pain of the Left Knee    Subjective          Marcela Gutierrez presents to [facility] for   History of Present Illness    Marcela returns for follow-up of her left knee.  She has left knee arthritis we are treating nonoperatively.  She is currently undergoing Euflexxa injections.  She presents today for Euflexxa injection #2.    Allergies   Allergen Reactions    Green Dyes Unknown - High Severity    Latex Hives    Morphine Hallucinations    Oxycodone-Acetaminophen Hives    Zoloft [Sertraline] Myalgia    Mastisol [Wound Dressing Adhesive] Unknown - High Severity    Adhesive Tape Rash        Social History     Socioeconomic History    Marital status:    Tobacco Use    Smoking status: Never     Passive exposure: Past    Smokeless tobacco: Never   Vaping Use    Vaping status: Never Used   Substance and Sexual Activity    Alcohol use: Yes     Comment: Socially 1 or 2 times a year 2 drink limit    Drug use: Never    Sexual activity: Not Currently     Partners: Male     Birth control/protection: Condom, Hysterectomy        I reviewed the patient's chief complaint, history of present illness, review of systems, past medical history, surgical history, family history, social history, medications, and allergy list.     REVIEW OF SYSTEMS    Constitutional: Denies fevers, chills, weight loss  Cardiovascular: Denies chest pain, shortness of breath  Skin: Denies rashes, acute skin changes  Neurologic: Denies headache, loss of consciousness  MSK: Left knee pain      Objective   Vital Signs:   /85   Pulse 79   Ht 160 cm (63\")   Wt 108 kg (237 lb)   SpO2 94%   BMI 41.98 kg/m²     Body mass index is 41.98 kg/m².    Physical Exam    General: Alert. No acute distress.   Left lower extremity:  ROM -5 to 100 degrees. Positive crepitus. Varus alignment. Medial tenderness. Smooth hip motion. Mild swelling. Positive EHL, FHL, GS and TA. Sensation intact to all 5 " nerves of the foot. Positive pulses. Stable to varus/valgus stress. Stable to anterior/posterior drawer. Negative Lachman's. Negative Nain's.     Large Joint Arthrocentesis: L knee  Date/Time: 6/17/2024 8:36 AM  Consent given by: patient  Site marked: site marked  Timeout: Immediately prior to procedure a time out was called to verify the correct patient, procedure, equipment, support staff and site/side marked as required   Supporting Documentation  Indications: pain   Procedure Details  Location: knee - L knee  Needle gauge: 21g.  Medications administered: 20 mg Sodium Hyaluronate (Viscosup) 20 MG/2ML  Patient tolerance: patient tolerated the procedure well with no immediate complications      This injection documentation was Scribed for Josep Chamberlain MD by Kassy Walker MA.  06/17/24   08:37 EDT    Imaging Results (Most Recent)       None                     Assessment and Plan        XR Chest PA & Lateral    Result Date: 6/10/2024  Narrative: XR CHEST PA AND LATERAL Date of Exam: 6/10/2024 2:20 PM EDT Indication: Cough for over a year that has become more productive. Chronic cough. Comparison: Two-view chest x-ray 5/2/2023 Findings: Lungs are adequately expanded and appear clear. No consolidation or pleural effusion is seen. Cardiomediastinal contours appear within normal limits. Ventricular shunt tubing over the right side of the chest appears stable.     Impression: Impression: No acute cardiopulmonary abnormality is identified. Electronically Signed: Dinah Ferguson  6/10/2024 5:12 PM EDT  Workstation ID: [id number]    XR Knee 4+ View Left    Result Date: 5/20/2024  Narrative: X-Ray Report: Study: X-rays ordered, taken in the office, and reviewed today Site: Left knee xray Indication: Left knee pain follow-up View: AP, lateral, sunrise left knee view(s) Findings: Severe tricompartmental left knee osteoarthritis visualized with collapse in the medial joint compartment.  Multiple areas of osteophyte  formation.  No acute fractures. Prior studies available for comparison: yes       Diagnoses and all orders for this visit:    1. Bilateral primary osteoarthritis of knee (Primary)    Other orders  -     Large Joint Arthrocentesis: L knee        We discussed the risk, benefits, indications, and alternatives to a left knee Euflexxa injection.  She elected to proceed and tolerated the injection well.  She will return in 1 week for Euflexxa injection #3.  Home exercises were provided today.        Call or return if worsening symptoms.    Scribed for Josep Chamberlain MD by Kassy Walker MA  06/17/2024   08:34 EDT         Follow Up       1 week    Patient was given instructions and counseling regarding her condition or for health maintenance advice. Please see specific information pulled into the AVS if appropriate.       I have personally performed the services described in this document as scribed by the above individual and it is both accurate and complete. Josep Chamberlain MD 06/17/24 08:42 EDT

## 2024-06-24 ENCOUNTER — OFFICE VISIT (OUTPATIENT)
Dept: ORTHOPEDIC SURGERY | Facility: CLINIC | Age: 54
End: 2024-06-24
Payer: COMMERCIAL

## 2024-06-24 VITALS
WEIGHT: 237 LBS | HEIGHT: 63 IN | SYSTOLIC BLOOD PRESSURE: 160 MMHG | OXYGEN SATURATION: 98 % | HEART RATE: 67 BPM | BODY MASS INDEX: 41.99 KG/M2 | DIASTOLIC BLOOD PRESSURE: 87 MMHG

## 2024-06-24 DIAGNOSIS — M17.12 PRIMARY OSTEOARTHRITIS OF LEFT KNEE: Primary | ICD-10-CM

## 2024-06-24 PROCEDURE — 3079F DIAST BP 80-89 MM HG: CPT | Performed by: STUDENT IN AN ORGANIZED HEALTH CARE EDUCATION/TRAINING PROGRAM

## 2024-06-24 PROCEDURE — 20610 DRAIN/INJ JOINT/BURSA W/O US: CPT | Performed by: STUDENT IN AN ORGANIZED HEALTH CARE EDUCATION/TRAINING PROGRAM

## 2024-06-24 PROCEDURE — 3077F SYST BP >= 140 MM HG: CPT | Performed by: STUDENT IN AN ORGANIZED HEALTH CARE EDUCATION/TRAINING PROGRAM

## 2024-06-24 PROCEDURE — 1160F RVW MEDS BY RX/DR IN RCRD: CPT | Performed by: STUDENT IN AN ORGANIZED HEALTH CARE EDUCATION/TRAINING PROGRAM

## 2024-06-24 PROCEDURE — 1159F MED LIST DOCD IN RCRD: CPT | Performed by: STUDENT IN AN ORGANIZED HEALTH CARE EDUCATION/TRAINING PROGRAM

## 2024-06-24 RX ORDER — HYALURONATE SODIUM 10 MG/ML
20 SYRINGE (ML) INTRAARTICULAR
Status: COMPLETED | OUTPATIENT
Start: 2024-06-24 | End: 2024-06-24

## 2024-06-24 RX ADMIN — Medication 20 MG: at 08:51

## 2024-06-24 NOTE — PROGRESS NOTES
"Chief Complaint  Follow-up and Pain of the Left Knee    Subjective          Marcela Gutierrez presents to [facility] for a follow up for her left knee.     History of Present Illness    The patient presents here today for a follow up for her left knee. She has been treating her left knee osteoarthritis conservatively with injections. She was last seen in the office on 06/17/24 where she received her second round of Euflexxa injections. She reports no complications from the injection and states some improvement. She reports no new injury, trauma or falls since her last visit.     Allergies   Allergen Reactions    Green Dyes Unknown - High Severity    Latex Hives    Morphine Hallucinations    Oxycodone-Acetaminophen Hives    Zoloft [Sertraline] Myalgia    Mastisol [Wound Dressing Adhesive] Unknown - High Severity    Adhesive Tape Rash        Social History     Socioeconomic History    Marital status:    Tobacco Use    Smoking status: Never     Passive exposure: Past    Smokeless tobacco: Never   Vaping Use    Vaping status: Never Used   Substance and Sexual Activity    Alcohol use: Yes     Comment: Socially 1 or 2 times a year 2 drink limit    Drug use: Never    Sexual activity: Not Currently     Partners: Male     Birth control/protection: Condom, Hysterectomy        I reviewed the patient's chief complaint, history of present illness, review of systems, past medical history, surgical history, family history, social history, medications, and allergy list.     REVIEW OF SYSTEMS    Constitutional: Denies fevers, chills, weight loss  Cardiovascular: Denies chest pain, shortness of breath  Skin: Denies rashes, acute skin changes  Neurologic: Denies headache, loss of consciousness  MSK: Left knee pain       Objective   Vital Signs:   /87   Pulse 67   Ht 160 cm (63\")   Wt 108 kg (237 lb)   SpO2 98%   BMI 41.98 kg/m²     Body mass index is 41.98 kg/m².    Physical " Exam    General: Alert. No acute distress.   Left lower extremity: ROM -5 to 100 degrees. Positive crepitus. Varus alignment. Medial tenderness. Smooth hip motion. Mild swelling. Positive EHL, FHL, GS and TA. Sensation intact to all 5 nerves of the foot. Positive pulses. Stable to varus/valgus stress. Stable to anterior/posterior drawer. Negative Lachman's. Negative Nain's     Large Joint Arthrocentesis: L knee  Date/Time: 6/24/2024 8:51 AM  Consent given by: patient  Site marked: site marked  Timeout: Immediately prior to procedure a time out was called to verify the correct patient, procedure, equipment, support staff and site/side marked as required   Supporting Documentation  Indications: pain   Procedure Details  Location: knee - L knee  Needle gauge: 21 G.  Medications administered: 20 mg Sodium Hyaluronate (Viscosup) 20 MG/2ML  Patient tolerance: patient tolerated the procedure well with no immediate complications    This injection documentation was Scribed for Josep Chamberlain MD by Kathya Hernandez.  06/24/24   08:53 EDT    Imaging Results (Most Recent)       None                     Assessment and Plan        XR Chest PA & Lateral    Result Date: 6/10/2024  Narrative: XR CHEST PA AND LATERAL Date of Exam: 6/10/2024 2:20 PM EDT Indication: Cough for over a year that has become more productive. Chronic cough. Comparison: Two-view chest x-ray 5/2/2023 Findings: Lungs are adequately expanded and appear clear. No consolidation or pleural effusion is seen. Cardiomediastinal contours appear within normal limits. Ventricular shunt tubing over the right side of the chest appears stable.     Impression: Impression: No acute cardiopulmonary abnormality is identified. Electronically Signed: Dinah Ferguson  6/10/2024 5:12 PM EDT  Workstation ID: [id number]      Diagnoses and all orders for this visit:    1. Primary osteoarthritis of left knee (Primary)        The patient presents here today for a follow up for her left knee  osteoarthritis.     Discussed the risks and benefits of a left knee #3 Euflexxa injection. Patient expressed understanding and wishes to proceed. She tolerated the injection well and without any complications.       Call or return if worsening symptoms.    Scribed for Josep Chamberlain MD by Jaylin Alvarenga  06/24/2024   08:47 EDT         Follow Up     PRN     Patient was given instructions and counseling regarding her condition or for health maintenance advice. Please see specific information pulled into the AVS if appropriate.       I have personally performed the services described in this document as scribed by the above individual and it is both accurate and complete. Josep Chamberlain MD 06/24/24 10:59 EDT

## 2024-06-30 ENCOUNTER — HOSPITAL ENCOUNTER (EMERGENCY)
Facility: HOSPITAL | Age: 54
Discharge: HOME | End: 2024-06-30
Attending: EMERGENCY MEDICINE
Payer: OTHER MISCELLANEOUS

## 2024-06-30 ENCOUNTER — APPOINTMENT (OUTPATIENT)
Dept: GENERAL RADIOLOGY | Facility: HOSPITAL | Age: 54
End: 2024-06-30
Payer: OTHER MISCELLANEOUS

## 2024-06-30 ENCOUNTER — APPOINTMENT (OUTPATIENT)
Dept: CT IMAGING | Facility: HOSPITAL | Age: 54
End: 2024-06-30
Payer: OTHER MISCELLANEOUS

## 2024-06-30 VITALS
TEMPERATURE: 98 F | OXYGEN SATURATION: 98 % | DIASTOLIC BLOOD PRESSURE: 84 MMHG | SYSTOLIC BLOOD PRESSURE: 122 MMHG | WEIGHT: 244.27 LBS | BODY MASS INDEX: 43.28 KG/M2 | HEIGHT: 63 IN | RESPIRATION RATE: 16 BRPM | HEART RATE: 75 BPM

## 2024-06-30 DIAGNOSIS — S20.212A CHEST WALL CONTUSION, LEFT, INITIAL ENCOUNTER: ICD-10-CM
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
## 2024-06-30 DIAGNOSIS — M17.0 OSTEOARTHRITIS OF BOTH KNEES, UNSPECIFIED OSTEOARTHRITIS TYPE: ICD-10-CM

## 2024-06-30 PROCEDURE — 73562 X-RAY EXAM OF KNEE 3: CPT

## 2024-06-30 PROCEDURE — 70450 CT HEAD/BRAIN W/O DYE: CPT

## 2024-06-30 PROCEDURE — 99284 EMERGENCY DEPT VISIT MOD MDM: CPT

## 2024-06-30 PROCEDURE — 71045 X-RAY EXAM CHEST 1 VIEW: CPT

## 2024-06-30 PROCEDURE — 73502 X-RAY EXAM HIP UNI 2-3 VIEWS: CPT

## 2024-06-30 RX ORDER — CYCLOBENZAPRINE HCL 10 MG
10 TABLET ORAL 3 TIMES DAILY
Qty: 10 TABLET | Refills: 0 | Status: SHIPPED | OUTPATIENT
Start: 2024-06-30

## 2024-06-30 RX ORDER — CYCLOBENZAPRINE HCL 10 MG
10 TABLET ORAL ONCE
Status: COMPLETED | OUTPATIENT
Start: 2024-06-30 | End: 2024-06-30

## 2024-06-30 RX ORDER — ACETAMINOPHEN 500 MG
1000 TABLET ORAL ONCE
Status: COMPLETED | OUTPATIENT
Start: 2024-06-30 | End: 2024-06-30

## 2024-06-30 RX ADMIN — ACETAMINOPHEN 1000 MG: 500 TABLET ORAL at 18:55

## 2024-06-30 RX ADMIN — CYCLOBENZAPRINE 10 MG: 10 TABLET, FILM COATED ORAL at 18:56

## 2024-06-30 NOTE — DISCHARGE INSTRUCTIONS
All x-rays are negative for any fractures or dislocations.  It does show you have osteoarthritis of bilateral knees  CT of your head was negative for internal bleeding and  shunt is in place    Take Tylenol/Motrin as needed for pain I have sent muscle relaxers, take as needed
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
## 2024-07-03 ENCOUNTER — OFFICE VISIT (OUTPATIENT)
Dept: ORTHOPEDIC SURGERY | Facility: CLINIC | Age: 54
End: 2024-07-03
Payer: OTHER MISCELLANEOUS

## 2024-07-03 DIAGNOSIS — S89.90XA KNEE INJURY, UNSPECIFIED LATERALITY, INITIAL ENCOUNTER: Primary | ICD-10-CM

## 2024-07-03 PROCEDURE — 99213 OFFICE O/P EST LOW 20 MIN: CPT | Performed by: STUDENT IN AN ORGANIZED HEALTH CARE EDUCATION/TRAINING PROGRAM
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
## 2024-07-18 ENCOUNTER — OFFICE VISIT (OUTPATIENT)
Dept: FAMILY MEDICINE CLINIC | Age: 54
End: 2024-07-18
Payer: COMMERCIAL

## 2024-07-18 VITALS
HEART RATE: 98 BPM | TEMPERATURE: 98.3 F | BODY MASS INDEX: 41.64 KG/M2 | HEIGHT: 63 IN | DIASTOLIC BLOOD PRESSURE: 80 MMHG | SYSTOLIC BLOOD PRESSURE: 144 MMHG | WEIGHT: 235 LBS | OXYGEN SATURATION: 100 %

## 2024-07-18 DIAGNOSIS — Z98.2 VP (VENTRICULOPERITONEAL) SHUNT STATUS: ICD-10-CM

## 2024-07-18 DIAGNOSIS — J45.21 INTERMITTENT ASTHMA WITH ACUTE EXACERBATION, UNSPECIFIED ASTHMA SEVERITY: ICD-10-CM

## 2024-07-18 DIAGNOSIS — J30.9 ALLERGIC RHINITIS, UNSPECIFIED SEASONALITY, UNSPECIFIED TRIGGER: ICD-10-CM

## 2024-07-18 DIAGNOSIS — M17.0 PRIMARY OSTEOARTHRITIS OF BOTH KNEES: ICD-10-CM

## 2024-07-18 DIAGNOSIS — I10 PRIMARY HYPERTENSION: ICD-10-CM

## 2024-07-18 DIAGNOSIS — F32.A MILD DEPRESSION: Primary | ICD-10-CM

## 2024-07-18 DIAGNOSIS — K21.9 GASTROESOPHAGEAL REFLUX DISEASE WITHOUT ESOPHAGITIS: ICD-10-CM

## 2024-07-18 DIAGNOSIS — R51.9 HEADACHE DISORDER: ICD-10-CM

## 2024-07-18 PROCEDURE — 1160F RVW MEDS BY RX/DR IN RCRD: CPT | Performed by: FAMILY MEDICINE

## 2024-07-18 PROCEDURE — 3079F DIAST BP 80-89 MM HG: CPT | Performed by: FAMILY MEDICINE

## 2024-07-18 PROCEDURE — G2211 COMPLEX E/M VISIT ADD ON: HCPCS | Performed by: FAMILY MEDICINE

## 2024-07-18 PROCEDURE — 1159F MED LIST DOCD IN RCRD: CPT | Performed by: FAMILY MEDICINE

## 2024-07-18 PROCEDURE — 99214 OFFICE O/P EST MOD 30 MIN: CPT | Performed by: FAMILY MEDICINE

## 2024-07-18 PROCEDURE — 3077F SYST BP >= 140 MM HG: CPT | Performed by: FAMILY MEDICINE

## 2024-07-18 RX ORDER — FLUTICASONE FUROATE AND VILANTEROL 200; 25 UG/1; UG/1
1 POWDER RESPIRATORY (INHALATION)
Qty: 60 EACH | Refills: 0 | Status: SHIPPED | OUTPATIENT
Start: 2024-07-18
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
## 2024-08-07 ENCOUNTER — TELEPHONE (OUTPATIENT)
Dept: FAMILY MEDICINE CLINIC | Age: 54
End: 2024-08-07
Payer: OTHER MISCELLANEOUS

## 2024-08-07 NOTE — TELEPHONE ENCOUNTER
Patient was referred to [facility] for depression. Patient states [facility] paperwork is asking questions that she feels is not necessary for them to know.    Patient Does not want to sign paper work for [facility] , feels she is signing over her rights to them. Patient states she will not and does not want any medication and only wanted to do therapy without signing all of those forms. Patient requesting call back  for advise.

## 2024-08-08 NOTE — TELEPHONE ENCOUNTER
I am sorry she feels that way.  I have never had a patient complain about this before, I am happy to refer her to another therapist.  My recommendation would be that she call her insurance company and find out who they will allow her to see and I will set up referral.  Dr. Espinal

## 2024-08-20 ENCOUNTER — OFFICE VISIT (OUTPATIENT)
Dept: PULMONOLOGY | Facility: CLINIC | Age: 54
End: 2024-08-20
Payer: COMMERCIAL

## 2024-08-20 VITALS
OXYGEN SATURATION: 96 % | HEIGHT: 63 IN | WEIGHT: 230.2 LBS | SYSTOLIC BLOOD PRESSURE: 153 MMHG | HEART RATE: 81 BPM | TEMPERATURE: 97.6 F | DIASTOLIC BLOOD PRESSURE: 85 MMHG | RESPIRATION RATE: 16 BRPM | BODY MASS INDEX: 40.79 KG/M2

## 2024-08-20 DIAGNOSIS — J45.40 MODERATE PERSISTENT ASTHMA WITHOUT COMPLICATION: Primary | ICD-10-CM

## 2024-08-20 DIAGNOSIS — R05.3 CHRONIC COUGH: ICD-10-CM

## 2024-08-20 PROCEDURE — 3077F SYST BP >= 140 MM HG: CPT | Performed by: NURSE PRACTITIONER

## 2024-08-20 PROCEDURE — 1159F MED LIST DOCD IN RCRD: CPT | Performed by: NURSE PRACTITIONER

## 2024-08-20 PROCEDURE — 3079F DIAST BP 80-89 MM HG: CPT | Performed by: NURSE PRACTITIONER

## 2024-08-20 PROCEDURE — 99214 OFFICE O/P EST MOD 30 MIN: CPT | Performed by: NURSE PRACTITIONER

## 2024-08-20 PROCEDURE — 1160F RVW MEDS BY RX/DR IN RCRD: CPT | Performed by: NURSE PRACTITIONER

## 2024-08-20 RX ORDER — ALBUTEROL SULFATE 2.5 MG/3ML
2.5 SOLUTION RESPIRATORY (INHALATION) 4 TIMES DAILY PRN
Qty: 120 EACH | Refills: 3 | Status: SHIPPED | OUTPATIENT
Start: 2024-08-20
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
## 2024-09-17 ENCOUNTER — TELEPHONE (OUTPATIENT)
Dept: PULMONOLOGY | Facility: CLINIC | Age: 54
End: 2024-09-17
Payer: OTHER MISCELLANEOUS

## 2024-09-18 DIAGNOSIS — R25.2 MUSCLE CRAMPS: Primary | ICD-10-CM

## 2024-09-19 RX ORDER — ESOMEPRAZOLE MAGNESIUM 40 MG/1
40 CAPSULE, DELAYED RELEASE ORAL
Qty: 90 CAPSULE | Refills: 1 | Status: SHIPPED | OUTPATIENT
Start: 2024-09-19

## 2024-09-20 ENCOUNTER — LAB (OUTPATIENT)
Dept: LAB | Facility: HOSPITAL | Age: 54
End: 2024-09-20
Payer: COMMERCIAL

## 2024-09-20 DIAGNOSIS — J45.40 MODERATE PERSISTENT ASTHMA WITHOUT COMPLICATION: ICD-10-CM

## 2024-09-20 DIAGNOSIS — R25.2 MUSCLE CRAMPS: ICD-10-CM

## 2024-09-20 DIAGNOSIS — R05.3 CHRONIC COUGH: ICD-10-CM

## 2024-09-20 LAB
ANION GAP SERPL CALCULATED.3IONS-SCNC: 11 MMOL/L (ref 5–15)
BASOPHILS # BLD AUTO: 0.06 10*3/MM3 (ref 0–0.2)
BASOPHILS NFR BLD AUTO: 0.6 % (ref 0–1.5)
BUN SERPL-MCNC: 14 MG/DL (ref 6–20)
BUN/CREAT SERPL: 15.9 (ref 7–25)
CALCIUM SPEC-SCNC: 9.7 MG/DL (ref 8.6–10.5)
CHLORIDE SERPL-SCNC: 103 MMOL/L (ref 98–107)
CO2 SERPL-SCNC: 24 MMOL/L (ref 22–29)
CREAT SERPL-MCNC: 0.88 MG/DL (ref 0.57–1)
DEPRECATED RDW RBC AUTO: 41.4 FL (ref 37–54)
EGFRCR SERPLBLD CKD-EPI 2021: 77.2 ML/MIN/1.73
EOSINOPHIL # BLD AUTO: 0.28 10*3/MM3 (ref 0–0.4)
EOSINOPHIL NFR BLD AUTO: 2.6 % (ref 0.3–6.2)
ERYTHROCYTE [DISTWIDTH] IN BLOOD BY AUTOMATED COUNT: 13.5 % (ref 12.3–15.4)
GLUCOSE SERPL-MCNC: 100 MG/DL (ref 65–99)
HCT VFR BLD AUTO: 39.6 % (ref 34–46.6)
HGB BLD-MCNC: 13.2 G/DL (ref 12–15.9)
IMM GRANULOCYTES # BLD AUTO: 0.01 10*3/MM3 (ref 0–0.05)
IMM GRANULOCYTES NFR BLD AUTO: 0.1 % (ref 0–0.5)
LYMPHOCYTES # BLD AUTO: 3.15 10*3/MM3 (ref 0.7–3.1)
LYMPHOCYTES NFR BLD AUTO: 29.8 % (ref 19.6–45.3)
MAGNESIUM SERPL-MCNC: 2 MG/DL (ref 1.6–2.6)
MCH RBC QN AUTO: 27.7 PG (ref 26.6–33)
MCHC RBC AUTO-ENTMCNC: 33.3 G/DL (ref 31.5–35.7)
MCV RBC AUTO: 83.2 FL (ref 79–97)
MONOCYTES # BLD AUTO: 0.51 10*3/MM3 (ref 0.1–0.9)
MONOCYTES NFR BLD AUTO: 4.8 % (ref 5–12)
NEUTROPHILS NFR BLD AUTO: 6.57 10*3/MM3 (ref 1.7–7)
NEUTROPHILS NFR BLD AUTO: 62.1 % (ref 42.7–76)
PLATELET # BLD AUTO: 439 10*3/MM3 (ref 140–450)
PMV BLD AUTO: 9.2 FL (ref 6–12)
POTASSIUM SERPL-SCNC: 3.8 MMOL/L (ref 3.5–5.2)
RBC # BLD AUTO: 4.76 10*6/MM3 (ref 3.77–5.28)
SODIUM SERPL-SCNC: 138 MMOL/L (ref 136–145)
WBC NRBC COR # BLD AUTO: 10.58 10*3/MM3 (ref 3.4–10.8)

## 2024-09-20 PROCEDURE — 82785 ASSAY OF IGE: CPT

## 2024-09-20 PROCEDURE — 80048 BASIC METABOLIC PNL TOTAL CA: CPT

## 2024-09-20 PROCEDURE — 36415 COLL VENOUS BLD VENIPUNCTURE: CPT

## 2024-09-20 PROCEDURE — 83735 ASSAY OF MAGNESIUM: CPT

## 2024-09-20 PROCEDURE — 85025 COMPLETE CBC W/AUTO DIFF WBC: CPT

## 2024-09-24 DIAGNOSIS — J45.40 MODERATE PERSISTENT ASTHMA WITHOUT COMPLICATION: Primary | ICD-10-CM

## 2024-09-24 LAB — IGE SERPL-ACNC: 42 IU/ML (ref 6–495)

## 2024-09-24 RX ORDER — FLUTICASONE FUROATE, UMECLIDINIUM BROMIDE AND VILANTEROL TRIFENATATE 200; 62.5; 25 UG/1; UG/1; UG/1
1 POWDER RESPIRATORY (INHALATION)
Qty: 60 EACH | Refills: 11 | Status: SHIPPED | OUTPATIENT
Start: 2024-09-24

## 2024-09-25 ENCOUNTER — OFFICE VISIT (OUTPATIENT)
Age: 54
End: 2024-09-25
Payer: COMMERCIAL

## 2024-09-25 DIAGNOSIS — F43.10 POST TRAUMATIC STRESS DISORDER (PTSD): Primary | ICD-10-CM

## 2024-09-25 DIAGNOSIS — F41.9 ANXIETY: ICD-10-CM

## 2024-09-25 RX ORDER — TOPIRAMATE 100 MG/1
100 TABLET, FILM COATED ORAL DAILY
Qty: 90 TABLET | Refills: 0 | Status: SHIPPED | OUTPATIENT
Start: 2024-09-25

## 2024-09-25 RX ORDER — HYDROCHLOROTHIAZIDE 25 MG/1
25 TABLET ORAL DAILY
Qty: 90 TABLET | Refills: 0 | Status: SHIPPED | OUTPATIENT
Start: 2024-09-25

## 2024-09-25 RX ORDER — METOPROLOL TARTRATE 50 MG
50 TABLET ORAL DAILY
Qty: 90 TABLET | Refills: 0 | Status: SHIPPED | OUTPATIENT
Start: 2024-09-25

## 2024-10-18 ENCOUNTER — HOSPITAL ENCOUNTER (OUTPATIENT)
Dept: CT IMAGING | Facility: HOSPITAL | Age: 54
Discharge: HOME | End: 2024-10-18
Payer: COMMERCIAL

## 2024-10-18 ENCOUNTER — HOSPITAL ENCOUNTER (OUTPATIENT)
Dept: RESPIRATORY THERAPY | Facility: HOSPITAL | Age: 54
Discharge: HOME | End: 2024-10-18
Payer: COMMERCIAL

## 2024-10-18 DIAGNOSIS — R05.3 CHRONIC COUGH: ICD-10-CM

## 2024-10-18 DIAGNOSIS — J45.40 MODERATE PERSISTENT ASTHMA WITHOUT COMPLICATION: ICD-10-CM

## 2024-10-18 PROCEDURE — 94726 PLETHYSMOGRAPHY LUNG VOLUMES: CPT

## 2024-10-18 PROCEDURE — 71250 CT THORAX DX C-: CPT

## 2024-10-18 PROCEDURE — 94729 DIFFUSING CAPACITY: CPT

## 2024-10-18 PROCEDURE — 94060 EVALUATION OF WHEEZING: CPT

## 2024-10-18 RX ORDER — ALBUTEROL SULFATE 0.83 MG/ML
2.5 SOLUTION RESPIRATORY (INHALATION) ONCE
Status: COMPLETED | OUTPATIENT
Start: 2024-10-18 | End: 2024-10-18

## 2024-10-18 RX ORDER — ALBUTEROL SULFATE 0.83 MG/ML
2.5 SOLUTION RESPIRATORY (INHALATION) ONCE
Status: DISCONTINUED | OUTPATIENT
Start: 2024-10-18 | End: 2024-10-18 | Stop reason: SDUPTHER

## 2024-10-18 RX ORDER — ALBUTEROL SULFATE 0.83 MG/ML
2.5 SOLUTION RESPIRATORY (INHALATION) ONCE
Status: DISCONTINUED | OUTPATIENT
Start: 2024-10-18 | End: 2024-10-19 | Stop reason: HOSPADM

## 2024-10-18 RX ADMIN — ALBUTEROL SULFATE 2.5 MG: 2.5 SOLUTION RESPIRATORY (INHALATION) at 10:59

## 2024-10-21 ENCOUNTER — LAB (OUTPATIENT)
Dept: LAB | Facility: HOSPITAL | Age: 54
End: 2024-10-21
Payer: COMMERCIAL

## 2024-10-21 ENCOUNTER — OFFICE VISIT (OUTPATIENT)
Dept: FAMILY MEDICINE CLINIC | Age: 54
End: 2024-10-21
Payer: COMMERCIAL

## 2024-10-21 VITALS
SYSTOLIC BLOOD PRESSURE: 130 MMHG | OXYGEN SATURATION: 99 % | HEIGHT: 63 IN | WEIGHT: 230 LBS | TEMPERATURE: 97.3 F | DIASTOLIC BLOOD PRESSURE: 69 MMHG | HEART RATE: 74 BPM | BODY MASS INDEX: 40.75 KG/M2

## 2024-10-21 DIAGNOSIS — R10.11 ABDOMINAL PAIN, RUQ: ICD-10-CM

## 2024-10-21 DIAGNOSIS — E55.9 VITAMIN D DEFICIENCY: ICD-10-CM

## 2024-10-21 DIAGNOSIS — Z78.0 POSTMENOPAUSAL STATE: ICD-10-CM

## 2024-10-21 DIAGNOSIS — I10 PRIMARY HYPERTENSION: ICD-10-CM

## 2024-10-21 DIAGNOSIS — Z23 NEED FOR INFLUENZA VACCINATION: ICD-10-CM

## 2024-10-21 DIAGNOSIS — D50.8 IRON DEFICIENCY ANEMIA SECONDARY TO INADEQUATE DIETARY IRON INTAKE: ICD-10-CM

## 2024-10-21 DIAGNOSIS — Z00.00 ENCOUNTER FOR ANNUAL WELLNESS EXAM IN MEDICARE PATIENT: Primary | ICD-10-CM

## 2024-10-21 DIAGNOSIS — Z12.31 SCREENING MAMMOGRAM FOR BREAST CANCER: ICD-10-CM

## 2024-10-21 DIAGNOSIS — E66.01 MORBID OBESITY WITH BMI OF 40.0-44.9, ADULT: ICD-10-CM

## 2024-10-21 DIAGNOSIS — K21.9 GASTROESOPHAGEAL REFLUX DISEASE WITHOUT ESOPHAGITIS: ICD-10-CM

## 2024-10-21 DIAGNOSIS — Z12.11 COLON CANCER SCREENING: ICD-10-CM

## 2024-10-21 DIAGNOSIS — J45.21 INTERMITTENT ASTHMA WITH ACUTE EXACERBATION, UNSPECIFIED ASTHMA SEVERITY: ICD-10-CM

## 2024-10-21 DIAGNOSIS — J30.9 ALLERGIC RHINITIS, UNSPECIFIED SEASONALITY, UNSPECIFIED TRIGGER: ICD-10-CM

## 2024-10-21 DIAGNOSIS — E78.00 HYPERCHOLESTEROLEMIA: ICD-10-CM

## 2024-10-21 DIAGNOSIS — Z98.2 VP (VENTRICULOPERITONEAL) SHUNT STATUS: ICD-10-CM

## 2024-10-21 DIAGNOSIS — M17.0 PRIMARY OSTEOARTHRITIS OF BOTH KNEES: ICD-10-CM

## 2024-10-21 DIAGNOSIS — Z86.73 HISTORY OF CVA (CEREBROVASCULAR ACCIDENT): ICD-10-CM

## 2024-10-21 LAB
25(OH)D3 SERPL-MCNC: 46.7 NG/ML (ref 30–100)
ALBUMIN SERPL-MCNC: 4.4 G/DL (ref 3.5–5.2)
ALBUMIN/GLOB SERPL: 1.2 G/DL
ALP SERPL-CCNC: 80 U/L (ref 39–117)
ALT SERPL W P-5'-P-CCNC: 18 U/L (ref 1–33)
ANION GAP SERPL CALCULATED.3IONS-SCNC: 8.7 MMOL/L (ref 5–15)
AST SERPL-CCNC: 22 U/L (ref 1–32)
BILIRUB SERPL-MCNC: 0.3 MG/DL (ref 0–1.2)
BUN SERPL-MCNC: 12 MG/DL (ref 6–20)
BUN/CREAT SERPL: 14.5 (ref 7–25)
CALCIUM SPEC-SCNC: 10.1 MG/DL (ref 8.6–10.5)
CHLORIDE SERPL-SCNC: 102 MMOL/L (ref 98–107)
CHOLEST SERPL-MCNC: 199 MG/DL (ref 0–200)
CO2 SERPL-SCNC: 27.3 MMOL/L (ref 22–29)
CREAT SERPL-MCNC: 0.83 MG/DL (ref 0.57–1)
DEPRECATED RDW RBC AUTO: 40.6 FL (ref 37–54)
EGFRCR SERPLBLD CKD-EPI 2021: 82.9 ML/MIN/1.73
ERYTHROCYTE [DISTWIDTH] IN BLOOD BY AUTOMATED COUNT: 13.1 % (ref 12.3–15.4)
GLOBULIN UR ELPH-MCNC: 3.8 GM/DL
GLUCOSE SERPL-MCNC: 98 MG/DL (ref 65–99)
HBA1C MFR BLD: 5.3 % (ref 4.8–5.6)
HCT VFR BLD AUTO: 39.7 % (ref 34–46.6)
HDLC SERPL-MCNC: 47 MG/DL (ref 40–60)
HGB BLD-MCNC: 13.3 G/DL (ref 12–15.9)
LDLC SERPL CALC-MCNC: 130 MG/DL (ref 0–100)
LDLC/HDLC SERPL: 2.71 {RATIO}
MCH RBC QN AUTO: 28.3 PG (ref 26.6–33)
MCHC RBC AUTO-ENTMCNC: 33.5 G/DL (ref 31.5–35.7)
MCV RBC AUTO: 84.5 FL (ref 79–97)
PLATELET # BLD AUTO: 367 10*3/MM3 (ref 140–450)
PMV BLD AUTO: 9.4 FL (ref 6–12)
POTASSIUM SERPL-SCNC: 3.3 MMOL/L (ref 3.5–5.2)
PROT SERPL-MCNC: 8.2 G/DL (ref 6–8.5)
RBC # BLD AUTO: 4.7 10*6/MM3 (ref 3.77–5.28)
SODIUM SERPL-SCNC: 138 MMOL/L (ref 136–145)
TRIGL SERPL-MCNC: 123 MG/DL (ref 0–150)
VLDLC SERPL-MCNC: 22 MG/DL (ref 5–40)
WBC NRBC COR # BLD AUTO: 6.21 10*3/MM3 (ref 3.4–10.8)

## 2024-10-21 PROCEDURE — 99214 OFFICE O/P EST MOD 30 MIN: CPT | Performed by: FAMILY MEDICINE

## 2024-10-21 PROCEDURE — 80061 LIPID PANEL: CPT

## 2024-10-21 PROCEDURE — G0008 ADMIN INFLUENZA VIRUS VAC: HCPCS | Performed by: FAMILY MEDICINE

## 2024-10-21 PROCEDURE — 82306 VITAMIN D 25 HYDROXY: CPT

## 2024-10-21 PROCEDURE — G0439 PPPS, SUBSEQ VISIT: HCPCS | Performed by: FAMILY MEDICINE

## 2024-10-21 PROCEDURE — 80053 COMPREHEN METABOLIC PANEL: CPT

## 2024-10-21 PROCEDURE — 85027 COMPLETE CBC AUTOMATED: CPT

## 2024-10-21 PROCEDURE — 90656 IIV3 VACC NO PRSV 0.5 ML IM: CPT | Performed by: FAMILY MEDICINE

## 2024-10-21 PROCEDURE — 36415 COLL VENOUS BLD VENIPUNCTURE: CPT

## 2024-10-21 PROCEDURE — 83036 HEMOGLOBIN GLYCOSYLATED A1C: CPT
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
## 2024-10-22 DIAGNOSIS — Z98.2 VP (VENTRICULOPERITONEAL) SHUNT STATUS: Primary | ICD-10-CM

## 2024-10-25 ENCOUNTER — TELEPHONE (OUTPATIENT)
Dept: FAMILY MEDICINE CLINIC | Age: 54
End: 2024-10-25
Payer: COMMERCIAL

## 2024-10-25 NOTE — TELEPHONE ENCOUNTER
Caller: Marcela Gutierrez    Relationship: Self    Best call back number: [phone]      What specialty or service is being requested: NEUROSURGERY    What is the provider, practice or medical service name:     Joshua Aquino MD  [address], [address], [place] [address] · [phone]      Any additional details: THIS IS THE DOCTOR WHO PLACED THE ORIGINAL STUNT AND SHE WOULD LIKE TO SEE HIM RATHER THAN THE PROVIDER WHO THE REFERRAL HAS ALREADY BEEN SENT TO

## 2024-10-28 NOTE — TELEPHONE ENCOUNTER
FYI patient wants to see this doctor instead.  This is a neurosurgeon for the shunt placement.  Would you mind changing the referral?  Dr. Espinal

## 2024-11-01 ENCOUNTER — HOSPITAL ENCOUNTER (OUTPATIENT)
Dept: CT IMAGING | Facility: HOSPITAL | Age: 54
Discharge: HOME | End: 2024-11-01
Payer: COMMERCIAL

## 2024-11-01 DIAGNOSIS — R10.11 ABDOMINAL PAIN, RUQ: ICD-10-CM

## 2024-11-01 PROCEDURE — 74176 CT ABD & PELVIS W/O CONTRAST: CPT

## 2024-11-19 ENCOUNTER — OFFICE VISIT (OUTPATIENT)
Dept: PULMONOLOGY | Facility: CLINIC | Age: 54
End: 2024-11-19
Payer: COMMERCIAL

## 2024-11-19 VITALS
HEIGHT: 63 IN | WEIGHT: 227 LBS | DIASTOLIC BLOOD PRESSURE: 82 MMHG | SYSTOLIC BLOOD PRESSURE: 146 MMHG | TEMPERATURE: 97.6 F | BODY MASS INDEX: 40.22 KG/M2 | HEART RATE: 79 BPM | RESPIRATION RATE: 16 BRPM | OXYGEN SATURATION: 100 %

## 2024-11-19 DIAGNOSIS — K21.9 GASTROESOPHAGEAL REFLUX DISEASE WITHOUT ESOPHAGITIS: ICD-10-CM

## 2024-11-19 DIAGNOSIS — J45.40 MODERATE PERSISTENT ASTHMA WITHOUT COMPLICATION: Primary | ICD-10-CM

## 2024-11-19 DIAGNOSIS — R05.3 CHRONIC COUGH: ICD-10-CM

## 2024-11-19 PROCEDURE — 99214 OFFICE O/P EST MOD 30 MIN: CPT | Performed by: NURSE PRACTITIONER

## 2024-11-19 PROCEDURE — 3077F SYST BP >= 140 MM HG: CPT | Performed by: NURSE PRACTITIONER

## 2024-11-19 PROCEDURE — 3079F DIAST BP 80-89 MM HG: CPT | Performed by: NURSE PRACTITIONER

## 2024-11-19 PROCEDURE — 1160F RVW MEDS BY RX/DR IN RCRD: CPT | Performed by: NURSE PRACTITIONER

## 2024-11-19 PROCEDURE — 1159F MED LIST DOCD IN RCRD: CPT | Performed by: NURSE PRACTITIONER
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
## 2024-12-16 RX ORDER — METOPROLOL TARTRATE 50 MG
TABLET ORAL DAILY
Qty: 90 TABLET | Refills: 0 | Status: SHIPPED | OUTPATIENT
Start: 2024-12-16

## 2024-12-17 RX ORDER — TOPIRAMATE 100 MG
100 TABLET ORAL DAILY
Qty: 90 TABLET | Refills: 0 | Status: SHIPPED | OUTPATIENT
Start: 2024-12-17

## 2024-12-17 RX ORDER — HYDROCHLOROTHIAZIDE 25 MG/1
25 TABLET ORAL DAILY
Qty: 90 TABLET | Refills: 0 | Status: SHIPPED | OUTPATIENT
Start: 2024-12-17

## 2025-01-02 RX ORDER — TOPIRAMATE 100 MG/1
100 TABLET, FILM COATED ORAL DAILY
Qty: 90 TABLET | Refills: 3 | OUTPATIENT
Start: 2025-01-02

## 2025-01-02 RX ORDER — HYDROCHLOROTHIAZIDE 25 MG/1
25 TABLET ORAL DAILY
Qty: 90 TABLET | Refills: 3 | OUTPATIENT
Start: 2025-01-02

## 2025-01-02 RX ORDER — METOPROLOL TARTRATE 50 MG
50 TABLET ORAL DAILY
Qty: 90 TABLET | Refills: 3 | OUTPATIENT
Start: 2025-01-02

## 2025-01-21 ENCOUNTER — OFFICE VISIT (OUTPATIENT)
Dept: FAMILY MEDICINE CLINIC | Age: 55
End: 2025-01-21
Payer: MEDICARE

## 2025-01-21 ENCOUNTER — HOSPITAL ENCOUNTER (OUTPATIENT)
Dept: GENERAL RADIOLOGY | Facility: HOSPITAL | Age: 55
Discharge: HOME | End: 2025-01-21
Admitting: STUDENT IN AN ORGANIZED HEALTH CARE EDUCATION/TRAINING PROGRAM
Payer: MEDICARE

## 2025-01-21 VITALS
HEART RATE: 83 BPM | OXYGEN SATURATION: 96 % | HEIGHT: 63 IN | BODY MASS INDEX: 42.1 KG/M2 | SYSTOLIC BLOOD PRESSURE: 123 MMHG | WEIGHT: 237.6 LBS | TEMPERATURE: 98.3 F | DIASTOLIC BLOOD PRESSURE: 61 MMHG

## 2025-01-21 DIAGNOSIS — R06.02 SHORTNESS OF BREATH: ICD-10-CM

## 2025-01-21 DIAGNOSIS — R09.81 NASAL CONGESTION: ICD-10-CM

## 2025-01-21 DIAGNOSIS — J01.90 ACUTE NON-RECURRENT SINUSITIS, UNSPECIFIED LOCATION: Primary | ICD-10-CM

## 2025-01-21 DIAGNOSIS — R05.1 ACUTE COUGH: ICD-10-CM

## 2025-01-21 PROCEDURE — 71046 X-RAY EXAM CHEST 2 VIEWS: CPT

## 2025-01-21 PROCEDURE — 99213 OFFICE O/P EST LOW 20 MIN: CPT | Performed by: STUDENT IN AN ORGANIZED HEALTH CARE EDUCATION/TRAINING PROGRAM

## 2025-01-21 PROCEDURE — 3078F DIAST BP <80 MM HG: CPT | Performed by: STUDENT IN AN ORGANIZED HEALTH CARE EDUCATION/TRAINING PROGRAM

## 2025-01-21 PROCEDURE — 3074F SYST BP LT 130 MM HG: CPT | Performed by: STUDENT IN AN ORGANIZED HEALTH CARE EDUCATION/TRAINING PROGRAM

## 2025-01-21 RX ORDER — FLUTICASONE PROPIONATE 50 MCG
1 SPRAY, SUSPENSION (ML) NASAL 2 TIMES DAILY
Qty: 16 G | Refills: 0 | Status: SHIPPED | OUTPATIENT
Start: 2025-01-21
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
## 2025-03-13 RX ORDER — ESOMEPRAZOLE MAGNESIUM 40 MG/1
40 CAPSULE, DELAYED RELEASE ORAL
Qty: 90 CAPSULE | Refills: 0 | Status: SHIPPED | OUTPATIENT
Start: 2025-03-13

## 2025-03-21 ENCOUNTER — OFFICE VISIT (OUTPATIENT)
Dept: PULMONOLOGY | Facility: CLINIC | Age: 55
End: 2025-03-21
Payer: MEDICARE

## 2025-03-21 VITALS
TEMPERATURE: 97.6 F | HEIGHT: 63 IN | OXYGEN SATURATION: 99 % | SYSTOLIC BLOOD PRESSURE: 111 MMHG | HEART RATE: 82 BPM | RESPIRATION RATE: 16 BRPM | WEIGHT: 234 LBS | DIASTOLIC BLOOD PRESSURE: 58 MMHG | BODY MASS INDEX: 41.46 KG/M2

## 2025-03-21 DIAGNOSIS — E66.01 CLASS 3 SEVERE OBESITY WITH SERIOUS COMORBIDITY AND BODY MASS INDEX (BMI) OF 40.0 TO 44.9 IN ADULT, UNSPECIFIED OBESITY TYPE: ICD-10-CM

## 2025-03-21 DIAGNOSIS — E66.813 CLASS 3 SEVERE OBESITY WITH SERIOUS COMORBIDITY AND BODY MASS INDEX (BMI) OF 40.0 TO 44.9 IN ADULT, UNSPECIFIED OBESITY TYPE: ICD-10-CM

## 2025-03-21 DIAGNOSIS — J45.40 MODERATE PERSISTENT ASTHMA WITHOUT COMPLICATION: Primary | Chronic | ICD-10-CM

## 2025-03-21 DIAGNOSIS — R05.3 CHRONIC COUGH: ICD-10-CM
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
## 2025-04-04 DIAGNOSIS — J45.909 ASTHMA, UNSPECIFIED ASTHMA SEVERITY, UNSPECIFIED WHETHER COMPLICATED, UNSPECIFIED WHETHER PERSISTENT: ICD-10-CM

## 2025-04-04 DIAGNOSIS — J45.40 MODERATE PERSISTENT ASTHMA WITHOUT COMPLICATION: ICD-10-CM

## 2025-04-04 NOTE — TELEPHONE ENCOUNTER
Caller: Marcela Gutierrez    Relationship: Self    Best call back number: [phone]     Requested Prescriptions:   Requested Prescriptions     Pending Prescriptions Disp Refills    metoprolol tartrate (LOPRESSOR) 50 MG tablet 90 tablet 0     Sig: Take  by mouth Daily.    hydroCHLOROthiazide 25 MG tablet 90 tablet 0     Sig: Take 1 tablet by mouth Daily.    topiramate (Topamax) 100 MG tablet 90 tablet 0     Sig: Take 1 tablet by mouth Daily.    esomeprazole (nexIUM) 40 MG capsule 90 capsule 0     Sig: Take 1 capsule by mouth Every Morning Before Breakfast.    albuterol sulfate  (90 Base) MCG/ACT inhaler 54 g 1     Sig: Inhale 2 puffs 4 (Four) Times a Day As Needed for Wheezing.    albuterol (PROVENTIL) (2.5 MG/3ML) 0.083% nebulizer solution 120 each 3     Sig: Take 2.5 mg by nebulization 4 (Four) Times a Day As Needed for Wheezing.        Pharmacy where request should be sent: [facility] - [facility] [address] - [phone] [facility] [phone]      Last office visit with prescribing clinician: 10/21/2024   Last telemedicine visit with prescribing clinician: Visit date not found   Next office visit with prescribing clinician: 4/8/2025     Additional details provided by patient: PHARMACY CHANGE DUE TO INSURANCE      Hamlet Davis Rep   04/04/25 16:43 EDT

## 2025-04-08 ENCOUNTER — HOSPITAL ENCOUNTER (OUTPATIENT)
Dept: MAMMOGRAPHY | Facility: HOSPITAL | Age: 55
Discharge: HOME | End: 2025-04-08
Admitting: FAMILY MEDICINE
Payer: MEDICARE

## 2025-04-08 ENCOUNTER — OFFICE VISIT (OUTPATIENT)
Dept: FAMILY MEDICINE CLINIC | Age: 55
End: 2025-04-08
Payer: MEDICARE

## 2025-04-08 VITALS
BODY MASS INDEX: 41.18 KG/M2 | SYSTOLIC BLOOD PRESSURE: 132 MMHG | HEART RATE: 87 BPM | WEIGHT: 232.4 LBS | HEIGHT: 63 IN | DIASTOLIC BLOOD PRESSURE: 84 MMHG | OXYGEN SATURATION: 100 % | TEMPERATURE: 98.7 F

## 2025-04-08 DIAGNOSIS — R73.03 PREDIABETES: ICD-10-CM

## 2025-04-08 DIAGNOSIS — Z98.2 VP (VENTRICULOPERITONEAL) SHUNT STATUS: ICD-10-CM

## 2025-04-08 DIAGNOSIS — K21.9 GASTROESOPHAGEAL REFLUX DISEASE WITHOUT ESOPHAGITIS: ICD-10-CM

## 2025-04-08 DIAGNOSIS — R11.2 NAUSEA AND VOMITING, UNSPECIFIED VOMITING TYPE: ICD-10-CM

## 2025-04-08 DIAGNOSIS — K59.00 CONSTIPATION, UNSPECIFIED CONSTIPATION TYPE: ICD-10-CM

## 2025-04-08 DIAGNOSIS — Z12.31 SCREENING MAMMOGRAM FOR BREAST CANCER: ICD-10-CM

## 2025-04-08 DIAGNOSIS — I10 PRIMARY HYPERTENSION: ICD-10-CM

## 2025-04-08 DIAGNOSIS — E55.9 VITAMIN D DEFICIENCY: ICD-10-CM

## 2025-04-08 DIAGNOSIS — D50.8 IRON DEFICIENCY ANEMIA SECONDARY TO INADEQUATE DIETARY IRON INTAKE: ICD-10-CM

## 2025-04-08 DIAGNOSIS — E66.01 CLASS 3 SEVERE OBESITY DUE TO EXCESS CALORIES WITH SERIOUS COMORBIDITY AND BODY MASS INDEX (BMI) OF 40.0 TO 44.9 IN ADULT: ICD-10-CM

## 2025-04-08 DIAGNOSIS — R73.9 ELEVATED BLOOD SUGAR: ICD-10-CM

## 2025-04-08 DIAGNOSIS — R51.9 HEADACHE DISORDER: Primary | ICD-10-CM

## 2025-04-08 DIAGNOSIS — J45.909 ASTHMA, UNSPECIFIED ASTHMA SEVERITY, UNSPECIFIED WHETHER COMPLICATED, UNSPECIFIED WHETHER PERSISTENT: ICD-10-CM

## 2025-04-08 DIAGNOSIS — K95.89: ICD-10-CM

## 2025-04-08 DIAGNOSIS — E78.00 HYPERCHOLESTEROLEMIA: ICD-10-CM

## 2025-04-08 DIAGNOSIS — E66.813 CLASS 3 SEVERE OBESITY DUE TO EXCESS CALORIES WITH SERIOUS COMORBIDITY AND BODY MASS INDEX (BMI) OF 40.0 TO 44.9 IN ADULT: ICD-10-CM

## 2025-04-08 PROCEDURE — 77067 SCR MAMMO BI INCL CAD: CPT

## 2025-04-08 PROCEDURE — 77063 BREAST TOMOSYNTHESIS BI: CPT

## 2025-04-08 RX ORDER — HYDROCHLOROTHIAZIDE 25 MG/1
25 TABLET ORAL DAILY
Qty: 90 TABLET | Refills: 0 | Status: SHIPPED | OUTPATIENT
Start: 2025-04-08

## 2025-04-08 RX ORDER — ESOMEPRAZOLE MAGNESIUM 40 MG/1
40 CAPSULE, DELAYED RELEASE ORAL
Qty: 90 CAPSULE | Refills: 0 | Status: SHIPPED | OUTPATIENT
Start: 2025-04-08

## 2025-04-08 RX ORDER — ALBUTEROL SULFATE 90 UG/1
2 INHALANT RESPIRATORY (INHALATION) 4 TIMES DAILY PRN
Qty: 54 G | Refills: 1 | Status: SHIPPED | OUTPATIENT
Start: 2025-04-08

## 2025-04-08 RX ORDER — ALBUTEROL SULFATE 0.83 MG/ML
2.5 SOLUTION RESPIRATORY (INHALATION) 4 TIMES DAILY PRN
Qty: 120 EACH | Refills: 3 | Status: SHIPPED | OUTPATIENT
Start: 2025-04-08

## 2025-04-08 RX ORDER — METOPROLOL TARTRATE 50 MG
50 TABLET ORAL DAILY
Qty: 90 TABLET | Refills: 0 | Status: SHIPPED | OUTPATIENT
Start: 2025-04-08

## 2025-04-08 RX ORDER — TOPIRAMATE 100 MG/1
100 TABLET, FILM COATED ORAL DAILY
Qty: 90 TABLET | Refills: 0 | Status: SHIPPED | OUTPATIENT
Start: 2025-04-08

## 2025-04-08 NOTE — ASSESSMENT & PLAN NOTE
Not currently on iron supplementation.  Will check labs and adjust Tx plan pending results        no

## 2025-04-08 NOTE — ASSESSMENT & PLAN NOTE
She is seeing neurosurgery and is stable.  Continue Topamax for headaches and she does not currently have any issues with headaches.  Blood pressure is well-controlled
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
## 2025-04-08 NOTE — ASSESSMENT & PLAN NOTE
Blood pressure stable and well-controlled on current treatment plan.  She is to avoid sodium in diet.  Will check labs to reeval electrolytes and kidney function    Orders:    Comprehensive Metabolic Panel; Future    CBC (No Diff); Future

## 2025-04-15 ENCOUNTER — HOSPITAL ENCOUNTER (OUTPATIENT)
Dept: CT IMAGING | Facility: HOSPITAL | Age: 55
Discharge: HOME | End: 2025-04-15
Admitting: FAMILY MEDICINE
Payer: MEDICARE

## 2025-04-15 DIAGNOSIS — K95.89: ICD-10-CM

## 2025-04-15 DIAGNOSIS — K59.00 CONSTIPATION, UNSPECIFIED CONSTIPATION TYPE: ICD-10-CM

## 2025-04-15 DIAGNOSIS — R11.2 NAUSEA AND VOMITING, UNSPECIFIED VOMITING TYPE: ICD-10-CM

## 2025-04-15 PROCEDURE — 74177 CT ABD & PELVIS W/CONTRAST: CPT

## 2025-04-15 PROCEDURE — 25510000001 IOPAMIDOL 61 % SOLUTION: Performed by: FAMILY MEDICINE

## 2025-04-15 RX ORDER — IOPAMIDOL 612 MG/ML
100 INJECTION, SOLUTION INTRAVASCULAR
Status: COMPLETED | OUTPATIENT
Start: 2025-04-15 | End: 2025-04-15

## 2025-04-15 RX ADMIN — IOPAMIDOL 100 ML: 612 INJECTION, SOLUTION INTRAVENOUS at 16:24

## 2025-04-24 ENCOUNTER — TELEPHONE (OUTPATIENT)
Dept: FAMILY MEDICINE CLINIC | Age: 55
End: 2025-04-24
Payer: MEDICARE

## 2025-04-24 NOTE — TELEPHONE ENCOUNTER
Name: Marcela Gutierrez    Relationship: Self    Best Callback Number: [phone]    HUB PROVIDED THE RELAY MESSAGE FROM THE OFFICE   PATIENT VOICED UNDERSTANDING AND HAS NO FURTHER QUESTIONS AT THIS TIME    ADDITIONAL INFORMATION: SHE WILL GET IT DONE AS SOON AS POSSIBLE.

## 2025-05-01 ENCOUNTER — TELEPHONE (OUTPATIENT)
Dept: FAMILY MEDICINE CLINIC | Age: 55
End: 2025-05-01
Payer: MEDICARE

## 2025-05-01 DIAGNOSIS — R91.1 LUNG NODULE: Primary | ICD-10-CM

## 2025-05-01 NOTE — TELEPHONE ENCOUNTER
Caller: Marcela Gutierrez    Relationship to patient: Self    Best call back number: [phone]     Patient is needing: PER PATIENT AFTER SPEAKING TO FAMILY SHE WOULD LIKE TO GO AHEAD WITH THE CAT SCAN DR. BARRERA HAD ORDERED AND SHE HAD ORIGINALLY DECLINED.    PLEASE CONTACT PATIENT TO SCHEDULE.

## 2025-05-06 ENCOUNTER — LAB (OUTPATIENT)
Dept: LAB | Facility: HOSPITAL | Age: 55
End: 2025-05-06
Payer: MEDICARE

## 2025-05-06 DIAGNOSIS — E66.01 CLASS 3 SEVERE OBESITY DUE TO EXCESS CALORIES WITH SERIOUS COMORBIDITY AND BODY MASS INDEX (BMI) OF 40.0 TO 44.9 IN ADULT: ICD-10-CM

## 2025-05-06 DIAGNOSIS — E78.00 HYPERCHOLESTEROLEMIA: ICD-10-CM

## 2025-05-06 DIAGNOSIS — E66.813 CLASS 3 SEVERE OBESITY DUE TO EXCESS CALORIES WITH SERIOUS COMORBIDITY AND BODY MASS INDEX (BMI) OF 40.0 TO 44.9 IN ADULT: ICD-10-CM

## 2025-05-06 DIAGNOSIS — I10 PRIMARY HYPERTENSION: ICD-10-CM

## 2025-05-06 DIAGNOSIS — R73.9 ELEVATED BLOOD SUGAR: ICD-10-CM

## 2025-05-06 DIAGNOSIS — E55.9 VITAMIN D DEFICIENCY: ICD-10-CM

## 2025-05-06 DIAGNOSIS — R73.03 PREDIABETES: ICD-10-CM

## 2025-05-06 PROCEDURE — 82306 VITAMIN D 25 HYDROXY: CPT

## 2025-05-06 PROCEDURE — 85027 COMPLETE CBC AUTOMATED: CPT

## 2025-05-06 PROCEDURE — 80061 LIPID PANEL: CPT

## 2025-05-06 PROCEDURE — 36415 COLL VENOUS BLD VENIPUNCTURE: CPT

## 2025-05-06 PROCEDURE — 80053 COMPREHEN METABOLIC PANEL: CPT

## 2025-05-06 PROCEDURE — 84439 ASSAY OF FREE THYROXINE: CPT

## 2025-05-06 PROCEDURE — 83036 HEMOGLOBIN GLYCOSYLATED A1C: CPT

## 2025-05-06 PROCEDURE — 84443 ASSAY THYROID STIM HORMONE: CPT

## 2025-05-07 LAB
25(OH)D3 SERPL-MCNC: 40.7 NG/ML (ref 30–100)
ALBUMIN SERPL-MCNC: 4.6 G/DL (ref 3.5–5.2)
ALBUMIN/GLOB SERPL: 1.2 G/DL
ALP SERPL-CCNC: 83 U/L (ref 39–117)
ALT SERPL W P-5'-P-CCNC: 19 U/L (ref 1–33)
ANION GAP SERPL CALCULATED.3IONS-SCNC: 12.6 MMOL/L (ref 5–15)
AST SERPL-CCNC: 26 U/L (ref 1–32)
BILIRUB SERPL-MCNC: 0.6 MG/DL (ref 0–1.2)
BUN SERPL-MCNC: 15 MG/DL (ref 6–20)
BUN/CREAT SERPL: 17.4 (ref 7–25)
CALCIUM SPEC-SCNC: 10.3 MG/DL (ref 8.6–10.5)
CHLORIDE SERPL-SCNC: 98 MMOL/L (ref 98–107)
CHOLEST SERPL-MCNC: 207 MG/DL (ref 0–200)
CO2 SERPL-SCNC: 27.4 MMOL/L (ref 22–29)
CREAT SERPL-MCNC: 0.86 MG/DL (ref 0.57–1)
DEPRECATED RDW RBC AUTO: 40.6 FL (ref 37–54)
EGFRCR SERPLBLD CKD-EPI 2021: 79.4 ML/MIN/1.73
ERYTHROCYTE [DISTWIDTH] IN BLOOD BY AUTOMATED COUNT: 13.8 % (ref 12.3–15.4)
GLOBULIN UR ELPH-MCNC: 3.8 GM/DL
GLUCOSE SERPL-MCNC: 94 MG/DL (ref 65–99)
HBA1C MFR BLD: 5.6 % (ref 4.8–5.6)
HCT VFR BLD AUTO: 39.5 % (ref 34–46.6)
HDLC SERPL-MCNC: 43 MG/DL (ref 40–60)
HGB BLD-MCNC: 12.9 G/DL (ref 12–15.9)
LDLC SERPL CALC-MCNC: 142 MG/DL (ref 0–100)
LDLC/HDLC SERPL: 3.25 {RATIO}
MCH RBC QN AUTO: 26.5 PG (ref 26.6–33)
MCHC RBC AUTO-ENTMCNC: 32.7 G/DL (ref 31.5–35.7)
MCV RBC AUTO: 81.3 FL (ref 79–97)
PLATELET # BLD AUTO: 395 10*3/MM3 (ref 140–450)
PMV BLD AUTO: 9.9 FL (ref 6–12)
POTASSIUM SERPL-SCNC: 3.4 MMOL/L (ref 3.5–5.2)
PROT SERPL-MCNC: 8.4 G/DL (ref 6–8.5)
RBC # BLD AUTO: 4.86 10*6/MM3 (ref 3.77–5.28)
SODIUM SERPL-SCNC: 138 MMOL/L (ref 136–145)
T4 FREE SERPL-MCNC: 1.33 NG/DL (ref 0.92–1.68)
TRIGL SERPL-MCNC: 121 MG/DL (ref 0–150)
TSH SERPL DL<=0.05 MIU/L-ACNC: 0.53 UIU/ML (ref 0.27–4.2)
VLDLC SERPL-MCNC: 22 MG/DL (ref 5–40)
WBC NRBC COR # BLD AUTO: 7.99 10*3/MM3 (ref 3.4–10.8)

## 2025-05-16 ENCOUNTER — HOSPITAL ENCOUNTER (OUTPATIENT)
Dept: CT IMAGING | Facility: HOSPITAL | Age: 55
Discharge: HOME | End: 2025-05-16
Payer: MEDICARE

## 2025-05-16 DIAGNOSIS — R91.1 LUNG NODULE: ICD-10-CM

## 2025-05-16 PROCEDURE — 71250 CT THORAX DX C-: CPT

## 2025-06-13 RX ORDER — METHOCARBAMOL 500 MG/1
500 TABLET, FILM COATED ORAL 4 TIMES DAILY
COMMUNITY

## 2025-06-23 RX ORDER — ESOMEPRAZOLE MAGNESIUM 40 MG/1
40 CAPSULE, DELAYED RELEASE ORAL
Qty: 90 CAPSULE | Refills: 0 | Status: SHIPPED | OUTPATIENT
Start: 2025-06-23

## 2025-06-23 RX ORDER — METOPROLOL TARTRATE 50 MG
50 TABLET ORAL DAILY
Qty: 90 TABLET | Refills: 0 | Status: SHIPPED | OUTPATIENT
Start: 2025-06-23

## 2025-06-24 ENCOUNTER — OFFICE VISIT (OUTPATIENT)
Dept: BARIATRICS/WEIGHT MGMT | Facility: CLINIC | Age: 55
End: 2025-06-24
Payer: MEDICARE

## 2025-06-24 VITALS
DIASTOLIC BLOOD PRESSURE: 90 MMHG | HEIGHT: 63 IN | HEART RATE: 66 BPM | SYSTOLIC BLOOD PRESSURE: 145 MMHG | BODY MASS INDEX: 40.75 KG/M2 | TEMPERATURE: 97.3 F | WEIGHT: 230 LBS

## 2025-06-24 DIAGNOSIS — R13.10 DYSPHAGIA, UNSPECIFIED TYPE: Primary | ICD-10-CM

## 2025-06-24 DIAGNOSIS — K31.89 STENOSIS OF STOMACH: ICD-10-CM

## 2025-06-24 DIAGNOSIS — Z98.2 VP (VENTRICULOPERITONEAL) SHUNT STATUS: ICD-10-CM

## 2025-06-24 DIAGNOSIS — Z98.84 HISTORY OF BARIATRIC SURGERY: ICD-10-CM

## 2025-06-24 DIAGNOSIS — K21.9 GASTROESOPHAGEAL REFLUX DISEASE, UNSPECIFIED WHETHER ESOPHAGITIS PRESENT: ICD-10-CM

## 2025-06-24 PROCEDURE — 99203 OFFICE O/P NEW LOW 30 MIN: CPT | Performed by: SURGERY

## 2025-06-24 PROCEDURE — 1160F RVW MEDS BY RX/DR IN RCRD: CPT | Performed by: SURGERY

## 2025-06-24 PROCEDURE — 1159F MED LIST DOCD IN RCRD: CPT | Performed by: SURGERY

## 2025-06-24 PROCEDURE — 3077F SYST BP >= 140 MM HG: CPT | Performed by: SURGERY

## 2025-06-24 PROCEDURE — 3080F DIAST BP >= 90 MM HG: CPT | Performed by: SURGERY

## 2025-06-24 RX ORDER — SODIUM CHLORIDE 0.9 % (FLUSH) 0.9 %
10 SYRINGE (ML) INJECTION EVERY 12 HOURS SCHEDULED
OUTPATIENT
Start: 2025-07-01

## 2025-06-24 RX ORDER — SODIUM CHLORIDE 0.9 % (FLUSH) 0.9 %
10 SYRINGE (ML) INJECTION AS NEEDED
OUTPATIENT
Start: 2025-07-01

## 2025-06-24 RX ORDER — SODIUM CHLORIDE 9 MG/ML
40 INJECTION, SOLUTION INTRAVENOUS AS NEEDED
OUTPATIENT
Start: 2025-07-01
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
## 2025-06-26 RX ORDER — HYDROCHLOROTHIAZIDE 25 MG/1
25 TABLET ORAL DAILY
Qty: 90 TABLET | Refills: 1 | Status: SHIPPED | OUTPATIENT
Start: 2025-06-26

## 2025-06-26 RX ORDER — TOPIRAMATE 100 MG/1
100 TABLET, FILM COATED ORAL DAILY
Qty: 90 TABLET | Refills: 3 | Status: SHIPPED | OUTPATIENT
Start: 2025-06-26

## 2025-06-30 RX ORDER — MULTIVIT AND MINERALS-FERROUS GLUCONATE 9 MG IRON/15 ML ORAL LIQUID 9 MG/15 ML
15 LIQUID (ML) ORAL DAILY
COMMUNITY

## 2025-07-01 ENCOUNTER — ANESTHESIA EVENT (OUTPATIENT)
Dept: GASTROENTEROLOGY | Facility: HOSPITAL | Age: 55
End: 2025-07-01
Payer: MEDICARE

## 2025-07-01 ENCOUNTER — ANESTHESIA (OUTPATIENT)
Dept: GASTROENTEROLOGY | Facility: HOSPITAL | Age: 55
End: 2025-07-01
Payer: MEDICARE

## 2025-07-01 ENCOUNTER — HOSPITAL ENCOUNTER (OUTPATIENT)
Facility: HOSPITAL | Age: 55
Setting detail: HOSPITAL OUTPATIENT SURGERY
Discharge: HOME | End: 2025-07-01
Attending: SURGERY | Admitting: SURGERY
Payer: MEDICARE

## 2025-07-01 VITALS
SYSTOLIC BLOOD PRESSURE: 150 MMHG | OXYGEN SATURATION: 100 % | BODY MASS INDEX: 40.4 KG/M2 | WEIGHT: 228 LBS | RESPIRATION RATE: 16 BRPM | HEART RATE: 63 BPM | DIASTOLIC BLOOD PRESSURE: 87 MMHG | HEIGHT: 63 IN

## 2025-07-01 DIAGNOSIS — K21.9 GASTROESOPHAGEAL REFLUX DISEASE, UNSPECIFIED WHETHER ESOPHAGITIS PRESENT: ICD-10-CM

## 2025-07-01 DIAGNOSIS — Z98.2 VP (VENTRICULOPERITONEAL) SHUNT STATUS: ICD-10-CM

## 2025-07-01 DIAGNOSIS — K31.89 STENOSIS OF STOMACH: ICD-10-CM

## 2025-07-01 DIAGNOSIS — R13.10 DYSPHAGIA, UNSPECIFIED TYPE: ICD-10-CM

## 2025-07-01 DIAGNOSIS — Z98.84 HISTORY OF BARIATRIC SURGERY: ICD-10-CM

## 2025-07-01 PROCEDURE — 25010000002 LIDOCAINE 2% SOLUTION: Performed by: NURSE ANESTHETIST, CERTIFIED REGISTERED

## 2025-07-01 PROCEDURE — 88342 IMHCHEM/IMCYTCHM 1ST ANTB: CPT | Performed by: SURGERY

## 2025-07-01 PROCEDURE — 25810000003 LACTATED RINGERS PER 1000 ML: Performed by: SURGERY

## 2025-07-01 PROCEDURE — 25010000002 PROPOFOL 10 MG/ML EMULSION: Performed by: NURSE ANESTHETIST, CERTIFIED REGISTERED

## 2025-07-01 PROCEDURE — 25010000002 ONDANSETRON PER 1 MG: Performed by: NURSE ANESTHETIST, CERTIFIED REGISTERED

## 2025-07-01 PROCEDURE — 88305 TISSUE EXAM BY PATHOLOGIST: CPT | Performed by: SURGERY

## 2025-07-01 PROCEDURE — C1726 CATH, BAL DIL, NON-VASCULAR: HCPCS | Performed by: SURGERY

## 2025-07-01 RX ORDER — LIDOCAINE HYDROCHLORIDE 20 MG/ML
INJECTION, SOLUTION INFILTRATION; PERINEURAL AS NEEDED
Status: DISCONTINUED | OUTPATIENT
Start: 2025-07-01 | End: 2025-07-01 | Stop reason: SURG

## 2025-07-01 RX ORDER — SODIUM CHLORIDE 9 MG/ML
40 INJECTION, SOLUTION INTRAVENOUS AS NEEDED
Status: DISCONTINUED | OUTPATIENT
Start: 2025-07-01 | End: 2025-07-01 | Stop reason: HOSPADM

## 2025-07-01 RX ORDER — SUCRALFATE 1 G/1
1 TABLET ORAL 3 TIMES DAILY
Qty: 90 TABLET | Refills: 1 | Status: SHIPPED | OUTPATIENT
Start: 2025-07-01

## 2025-07-01 RX ORDER — ONDANSETRON 2 MG/ML
INJECTION INTRAMUSCULAR; INTRAVENOUS AS NEEDED
Status: DISCONTINUED | OUTPATIENT
Start: 2025-07-01 | End: 2025-07-01 | Stop reason: SURG

## 2025-07-01 RX ORDER — SODIUM CHLORIDE 0.9 % (FLUSH) 0.9 %
10 SYRINGE (ML) INJECTION EVERY 12 HOURS SCHEDULED
Status: DISCONTINUED | OUTPATIENT
Start: 2025-07-01 | End: 2025-07-01 | Stop reason: HOSPADM

## 2025-07-01 RX ORDER — PROPOFOL 10 MG/ML
VIAL (ML) INTRAVENOUS AS NEEDED
Status: DISCONTINUED | OUTPATIENT
Start: 2025-07-01 | End: 2025-07-01 | Stop reason: SURG

## 2025-07-01 RX ORDER — SODIUM CHLORIDE, SODIUM LACTATE, POTASSIUM CHLORIDE, CALCIUM CHLORIDE 600; 310; 30; 20 MG/100ML; MG/100ML; MG/100ML; MG/100ML
30 INJECTION, SOLUTION INTRAVENOUS CONTINUOUS
Status: DISCONTINUED | OUTPATIENT
Start: 2025-07-01 | End: 2025-07-01 | Stop reason: HOSPADM

## 2025-07-01 RX ORDER — ONDANSETRON 2 MG/ML
INJECTION INTRAMUSCULAR; INTRAVENOUS
Status: DISCONTINUED
Start: 2025-07-01 | End: 2025-07-01 | Stop reason: WASHOUT

## 2025-07-01 RX ORDER — SODIUM CHLORIDE 0.9 % (FLUSH) 0.9 %
10 SYRINGE (ML) INJECTION AS NEEDED
Status: DISCONTINUED | OUTPATIENT
Start: 2025-07-01 | End: 2025-07-01 | Stop reason: HOSPADM

## 2025-07-01 RX ADMIN — PROPOFOL 120 MG: 10 INJECTION, EMULSION INTRAVENOUS at 07:53

## 2025-07-01 RX ADMIN — PROPOFOL 30 MG: 10 INJECTION, EMULSION INTRAVENOUS at 07:58

## 2025-07-01 RX ADMIN — LIDOCAINE HYDROCHLORIDE 100 MG: 20 INJECTION, SOLUTION INFILTRATION; PERINEURAL at 07:53

## 2025-07-01 RX ADMIN — SODIUM CHLORIDE, POTASSIUM CHLORIDE, SODIUM LACTATE AND CALCIUM CHLORIDE 30 ML/HR: 600; 310; 30; 20 INJECTION, SOLUTION INTRAVENOUS at 07:46

## 2025-07-01 RX ADMIN — ONDANSETRON 4 MG: 2 INJECTION, SOLUTION INTRAMUSCULAR; INTRAVENOUS at 08:07

## 2025-07-01 RX ADMIN — PROPOFOL 30 MG: 10 INJECTION, EMULSION INTRAVENOUS at 07:56

## 2025-07-01 NOTE — OP NOTE
Surgeon: Mario Barboza Jr., M.D.    Preoperative Diagnosis: #1 dysphagia with solids #2 history of vertical banded gastroplasty stricture    Postoperative Diagnosis: #1 VBG stricture #2 gastritis    Procedure Performed: Transoral esophagogastroduodenoscopy with 18-20 dilatation of vertical banded gastroplasty stricture and gastric antral biopsies    Indications: 55-year-old female status post vertical banded gastroplasty with history of VVG stricture status post dilatation.  Last 1 was about 2 years ago.    Procedure:     The procedure, indications, preparation and potential complications were explained to the patient, who indicated understanding and signed the corresponding consent forms.  The patient was identified, taken to the endoscopy suite, and placed on the left side down decubitus position.  The patient underwent a MAC anesthesia and was appropriately monitored through the case by the anesthesia personnel with continuous pulse oximetry, blood pressure, and cardiac monitoring.  A bite block was placed.  After adequate IV sedation and using a transoral technique a lubed flexible endoscope was placed in the hypopharynx and advanced to the second portion of the duodenum without difficulty. The scope was then withdrawn back into the antrum of the stomach.  Cold forcep biopsies of the antrum were taken to rule out Helicobacter pylori.  The scope was retroflexed noting the body, fundus and cardia.  The scope was then withdrawn back into the esophagus after decompressing the stomach.  The Z line was noted and GE junction measured from the incisors.  The scope was then completely withdrawn.  The patient tolerated the procedure well and left the endoscopy suite in stable condition.  The findings are listed below.    Duodenum: Unremarkable  Antrum: Patchy erythema  Body/Fundus: The VBG pouch was intact without any breakdown of the staple line.  Patient was noted to have a stricture at the banded gastroplasty.  This  was dilated with a 18-20 balloon which was taken up in sequence and each level held for approximately 1 minute.  Cardia: Patchy erythema noted in the VBG pouch; slightly enlarged pouch but otherwise unremarkable  Esophagus: Z-line fairly regular, no erosive esophagitis    Recommendations:     Continue with PPI and will add Carafate.  Await biopsy results and follow-up in the office.

## 2025-07-01 NOTE — DISCHARGE INSTRUCTIONS
For the next 24 hours patient needs to be with a responsible adult.    For 24 hours DO NOT drive, operate machinery, appliances, drink alcohol, make important decisions or sign legal documents.    Start with a light or bland diet if you are feeling sick to your stomach otherwise advance to regular diet as tolerated.    Follow recommendations on procedure report if provided by your doctor.    Call Dr Barboza for problems [phone]    Problems may include but not limited to: large amounts of bleeding, trouble breathing, repeated vomiting, severe unrelieved pain, fever or chills.

## 2025-07-01 NOTE — ANESTHESIA POSTPROCEDURE EVALUATION
Patient: Marcela Gutierrez    Procedure Summary       Date: 07/01/25 Room / [facility]:  [facility]    Anesthesia Start: 0749 Anesthesia Stop: 0803    Procedure: ESOPHAGOGASTRODUODENOSCOPY WITH BALLOON  DILATATION 18-20MM OF DBG STRICTURE, BIOPSIES (Esophagus) Diagnosis:       Dysphagia, unspecified type      History of bariatric surgery      Stenosis of stomach      Gastroesophageal reflux disease, unspecified whether esophagitis present       (ventriculoperitoneal) shunt status      (Dysphagia, unspecified type [R13.10])      (History of bariatric surgery [Z98.84])      (Stenosis of stomach [K31.89])      (Gastroesophageal reflux disease, unspecified whether esophagitis present [K21.9])      ( (ventriculoperitoneal) shunt status [Z98.2])    Surgeons: Mario Barboza Jr., MD Provider: Joshua Calderon MD    Anesthesia Type: MAC ASA Status: 3            Anesthesia Type: MAC    Vitals  Vitals Value Taken Time   /87 07/01/25 08:24   Temp     Pulse 70 07/01/25 08:26   Resp 16 07/01/25 08:24   SpO2 98 % 07/01/25 08:26   Vitals shown include unfiled device data.        Post Anesthesia Care and Evaluation    Patient location during evaluation: PACU  Patient participation: complete - patient participated  Level of consciousness: awake and alert  Pain management: adequate    Airway patency: patent  Anesthetic complications: No anesthetic complications    Cardiovascular status: acceptable  Respiratory status: acceptable  Hydration status: acceptable    Comments: --------------------            07/01/25               0824     --------------------   BP:       150/87     Pulse:      63       Resp:       16       SpO2:      100%     --------------------

## 2025-07-01 NOTE — ANESTHESIA PREPROCEDURE EVALUATION
Anesthesia Evaluation     Patient summary reviewed and Nursing notes reviewed   history of anesthetic complications:  Prolonged sedation               Airway   Mallampati: II  Dental      Pulmonary    (+) asthma,shortness of breath, sleep apnea  Cardiovascular     ECG reviewed  Patient on routine beta blocker  Rhythm: regular  Rate: normal    (+) hypertension, hyperlipidemia      Neuro/Psych  (+) CVA, headaches, dizziness/light headedness, psychiatric history Anxiety  GI/Hepatic/Renal/Endo    (+) morbid obesity, GERD, PUD, GI bleeding , liver disease fatty liver disease    Musculoskeletal     (+) myalgias  Abdominal    Substance History - negative use     OB/GYN negative ob/gyn ROS         Other   arthritis,                 Anesthesia Plan    ASA 3     MAC     intravenous induction     Anesthetic plan, risks, benefits, and alternatives have been provided, discussed and informed consent has been obtained with: patient.    CODE STATUS:

## 2025-07-03 LAB
CYTO UR: NORMAL
LAB AP CASE REPORT: NORMAL
LAB AP SPECIAL STAINS: NORMAL
PATH REPORT.FINAL DX SPEC: NORMAL
PATH REPORT.GROSS SPEC: NORMAL

## 2025-07-30 ENCOUNTER — TELEPHONE (OUTPATIENT)
Dept: BARIATRICS/WEIGHT MGMT | Facility: CLINIC | Age: 55
End: 2025-07-30
Payer: MEDICARE

## 2025-07-31 RX ORDER — ESOMEPRAZOLE MAGNESIUM 40 MG/1
40 CAPSULE, DELAYED RELEASE ORAL
Qty: 90 CAPSULE | Refills: 3 | OUTPATIENT
Start: 2025-07-31

## (undated) DEVICE — BLCK/BITE BLOX W/DENTL/RIM W/STRAP 54F

## (undated) DEVICE — Device: Brand: DEFENDO AIR/WATER/SUCTION AND BIOPSY VALVE

## (undated) DEVICE — SENSR O2 OXIMAX FNGR A/ 18IN NONSTR

## (undated) DEVICE — LINER SURG CANSTR SXN S/RIGD 1500CC

## (undated) DEVICE — SOL IRRG H2O PL/BG 1000ML STRL

## (undated) DEVICE — DEV INFL CRE STERIFLATE 60CC DISP

## (undated) DEVICE — NET RETRV ROTHNET SELCT 2.5MM 3X6X230CM NS

## (undated) DEVICE — BLCK/BITE BLOX WO/DENTL/RIM W/STRAP 54F

## (undated) DEVICE — Device

## (undated) DEVICE — TUBING, SUCTION, 1/4" X 10', STRAIGHT: Brand: MEDLINE

## (undated) DEVICE — ESOPHAGEAL BALLOON DILATATION CATHETER: Brand: CRE FIXED WIRE

## (undated) DEVICE — CONN JET HYDRA H20 AUXILIARY DISP

## (undated) DEVICE — SINGLE-USE BIOPSY FORCEPS: Brand: RADIAL JAW 4

## (undated) DEVICE — MSK PROC CURAPLEX O2 2/ADAPT 7FT

## (undated) DEVICE — SOLIDIFIER LIQLOC PLS 1500CC BT

## (undated) DEVICE — KT VLV BIOP DEFENDO SXN AIR/WATER

## (undated) DEVICE — FRCP BX RADJAW4 NDL 2.8 240CM LG OG BX40

## (undated) DEVICE — KT ORCA ORCAPOD DISP STRL

## (undated) DEVICE — ADAPT CLN BIOGUARD AIR/H2O DISP

## (undated) DEVICE — LN SMPL CO2 SHTRM SD STREAM W/M LUER